# Patient Record
Sex: FEMALE | Race: WHITE | NOT HISPANIC OR LATINO | Employment: OTHER | URBAN - METROPOLITAN AREA
[De-identification: names, ages, dates, MRNs, and addresses within clinical notes are randomized per-mention and may not be internally consistent; named-entity substitution may affect disease eponyms.]

---

## 2017-01-04 ENCOUNTER — TRANSFERRED RECORDS (OUTPATIENT)
Dept: FAMILY MEDICINE | Facility: CLINIC | Age: 67
End: 2017-01-04

## 2017-01-09 RX ORDER — ROSUVASTATIN CALCIUM 10 MG/1
TABLET, COATED ORAL
Qty: 90 TABLET | Refills: 0 | OUTPATIENT
Start: 2017-01-09

## 2017-01-09 NOTE — TELEPHONE ENCOUNTER
Refused Prescriptions:                       Disp   Refills    rosuvastatin (CRESTOR) 10 MG tablet [Pharm*90 tab*0        Sig: TAKE 1 TABLET(10 MG) BY MOUTH DAILY  Refused By: KENNEY BALL  Reason for Refusal: Request already responded to by other means (phone, fax, etc.)  Reason for Refusal Comment: refilled 11- for six months    Hunter  [phone] (home)

## 2017-01-13 RX ORDER — ATENOLOL 25 MG/1
TABLET ORAL
Qty: 90 TABLET | Refills: 0 | OUTPATIENT
Start: 2017-01-13

## 2017-01-17 ENCOUNTER — OFFICE VISIT (OUTPATIENT)
Dept: FAMILY MEDICINE | Facility: CLINIC | Age: 67
End: 2017-01-17

## 2017-01-17 VITALS
OXYGEN SATURATION: 98 % | BODY MASS INDEX: 43.02 KG/M2 | TEMPERATURE: 98.3 F | HEIGHT: 64 IN | HEART RATE: 66 BPM | DIASTOLIC BLOOD PRESSURE: 80 MMHG | WEIGHT: 252 LBS | RESPIRATION RATE: 16 BRPM | SYSTOLIC BLOOD PRESSURE: 120 MMHG

## 2017-01-17 DIAGNOSIS — B02.9 HERPES ZOSTER WITHOUT COMPLICATION: Primary | ICD-10-CM

## 2017-01-17 PROCEDURE — 99213 OFFICE O/P EST LOW 20 MIN: CPT | Performed by: PHYSICIAN ASSISTANT

## 2017-01-17 RX ORDER — ESTRADIOL 10 UG/1
INSERT VAGINAL
COMMUNITY
Start: 2017-01-17 | End: 2018-07-09

## 2017-01-17 RX ORDER — VALACYCLOVIR HYDROCHLORIDE 1 G/1
1000 TABLET, FILM COATED ORAL 3 TIMES DAILY
Qty: 21 TABLET | Refills: 0 | Status: SHIPPED | OUTPATIENT
Start: 2017-01-17 | End: 2017-01-30

## 2017-01-17 RX ORDER — GABAPENTIN 100 MG/1
CAPSULE ORAL
Qty: 90 CAPSULE | Refills: 0 | Status: SHIPPED | OUTPATIENT
Start: 2017-01-17 | End: 2017-01-30

## 2017-01-17 RX ORDER — GABAPENTIN 100 MG/1
CAPSULE ORAL
Qty: 540 CAPSULE | Refills: 0 | OUTPATIENT
Start: 2017-01-17

## 2017-01-17 NOTE — PROGRESS NOTES
Chief Complaint   Patient presents with     Derm Problem       HPI  Maria Victoria Arcos is a 66 year old female presents with a pain and rash on back and right armpit. Pain started on Friday, rash last night  The patient has had shingles in the past.      Past Medical History   Diagnosis Date     Essential hypertension, benign      Other and unspecified hyperlipidemia 10/18/2006     Generalized osteoarthrosis, unspecified site 10/18/2006     Unspecified asthma(493.90)      mild     Sleep apnea      CPAP     Impaired fasting glucose 1/29/2010     Asthma      DVT of upper extremity (deep vein thrombosis) (H)        Patient Active Problem List   Diagnosis     Generalized osteoarthrosis, unspecified site     Esophageal reflux     Herpes simplex virus (HSV) infection     Sleep apnea     Intermittent asthma     Impaired fasting glucose     Factor V Leiden mutation (H)     Health Care Home     HTN, goal below 140/90     HCD (health care directive)     Hyperlipidemia LDL goal <130     Rosacea     Basal cell carcinoma of face     High BMI     Ventral hernia     ACP (advance care planning)     Morbid obesity due to excess calories (H)     Cranial third nerve paresis, left/ diplopia       Current Outpatient Prescriptions   Medication     VAGIFEM 10 MCG TABS vaginal tablet     valACYclovir (VALTREX) 1000 mg tablet     gabapentin (NEURONTIN) 100 MG capsule     atenolol (TENORMIN) 25 MG tablet     rosuvastatin (CRESTOR) 10 MG tablet     potassium chloride SA (POTASSIUM CHLORIDE) 20 MEQ tablet     omeprazole (PRILOSEC) 20 MG capsule     triamterene-hydrochlorothiazide (MAXZIDE) 75-50 MG per tablet     valACYclovir (VALTREX) 1000 mg tablet     albuterol (PROAIR HFA, PROVENTIL HFA, VENTOLIN HFA) 108 (90 BASE) MCG/ACT inhaler     ketoconazole (NIZORAL) 2 % cream     acyclovir (ZOVIRAX) 5 % ointment     budesonide-formoterol (SYMBICORT) 80-4.5 MCG/ACT inhaler     aspirin 81 MG tablet     MULTI-VITAMIN OR TABS     TYLENOL 325 MG OR TABS  "    OMEGA 3 550 MG OR CAPS     No current facility-administered medications for this visit.        Allergies:  Allergies   Allergen Reactions     Advicor Hives     Is a combination medication of niacin and lovastatin     Hylan G-F 20 Swelling     Meperidine Hcl Hives     Methocarbamol Hives       OBJECTIVE  Filed Vitals:    01/17/17 1119   BP: 120/80   Pulse: 66   Temp: 98.3  F (36.8  C)   TempSrc: Oral   Resp: 16   Height: 1.626 m (5' 4\")   Weight: 114.306 kg (252 lb)   SpO2: 98%        PHYSICAL EXAMINATION    Patient is a 66 year old female, in no acute distress.  Vesicles on erythematous bases in clusters on the right upper back and right axilla in a dermatomal pattern.       Assessment/Plan:     (B02.9) Herpes zoster without complication  (primary encounter diagnosis)  Plan: valACYclovir (VALTREX) 1000 mg tablet,         gabapentin (NEURONTIN) 100 MG capsule        The nature of herpes zoster is explained carefully. Lesions should be compressed/soaked with saline, topical antibiotic ointment to any infected lesions; Aloe Vera cream may help minor local pain. Intervention with antiviral agents is extremely helpful early in the course of the disease, less helpful after 2-3 days of symptoms. Postherpetic neuralgia is explained; this may occur especially in the elderly despite every attempt at prevention. Prescription for valacyclovir (Valtrex), which may shorten the course of acute symptoms and reduce the incidence of later neuralgia. The patient understands these issues, and will call as needed for further care.  Gabapentin also rx'd due to pain, AE reviewed    Jacqui Landers PA-C  1/17/2017          "

## 2017-01-17 NOTE — NURSING NOTE
Patient states that she has been feeling under the weather for a few days and having right shoulder pain and then last night and rash stared.  Pre-Visit Screening not done today.  BP done on the right arm, with a lg sized cuff.  Pulse - regular  My Chart - accepts    CLASSIFICATION OF OVERWEIGHT AND OBESITY BY BMI                         Obesity Class           BMI(kg/m2)  Underweight                                    < 18.5  Normal                                         18.5-24.9  Overweight                                     25.0-29.9  OBESITY                     I                  30.0-34.9                              II                 35.0-39.9  EXTREME OBESITY             III                >40                             Patient's  BMI Body mass index is 43.23 kg/(m^2).  http://hin.nhlbi.nih.gov/menuplanner/menu.cgi  Questioned patient about current smoking habits.  Pt. has never smoked.

## 2017-01-23 ENCOUNTER — TELEPHONE (OUTPATIENT)
Dept: FAMILY MEDICINE | Facility: CLINIC | Age: 67
End: 2017-01-23

## 2017-01-23 DIAGNOSIS — B02.9 HERPES ZOSTER WITHOUT COMPLICATION: Primary | ICD-10-CM

## 2017-01-23 NOTE — TELEPHONE ENCOUNTER
Jacqui,     Pt called because she has been having a lot of pain from the shingles under the arm into the chest. She states that she has been taking her husbands pain medication and would like to have something of her own to get through this.  She states that she doesn't feel as if the medication she is on is working and that she is unable to sleep at night. That is why she took her husbands medication.   Please advise.     Phone number is [phone]    SOLANGE Mathur ([facility])

## 2017-01-24 RX ORDER — GABAPENTIN 100 MG/1
CAPSULE ORAL
Qty: 90 CAPSULE | Refills: 0 | Status: SHIPPED | OUTPATIENT
Start: 2017-01-24 | End: 2017-11-14

## 2017-01-24 RX ORDER — HYDROCODONE BITARTRATE AND ACETAMINOPHEN 5; 325 MG/1; MG/1
1-2 TABLET ORAL EVERY 4 HOURS PRN
Qty: 20 TABLET | Refills: 0 | Status: SHIPPED | OUTPATIENT
Start: 2017-01-24 | End: 2017-11-14

## 2017-01-24 NOTE — TELEPHONE ENCOUNTER
Called Pt and you had already prescribed Gabapentin to the Pt. Asked how she is taking it. She states that she is taking 1 pill 3 x's a day, but does not notice a difference. Do you want her to get the new prescription still?  Francesca.SOLANGE Quiroga ([facility])

## 2017-01-24 NOTE — TELEPHONE ENCOUNTER
She can inc. To 200 three times a day.  I also signed an rx for [place] in my pod that she can use short term for the pain.   Have her come pick it up    Jacqui Landers PA-C  1/24/2017

## 2017-01-24 NOTE — TELEPHONE ENCOUNTER
Please call pt - I sent in a prescription for Gabapentin - start by taking at home to see how she reacts to it. It can be very sedating. No drinking or driving. May take 3 times daily.    Jacqui Landers PA-C  1/24/2017

## 2017-01-24 NOTE — TELEPHONE ENCOUNTER
Called the patient and informed her of Jacqui's message.  She is aware and will  prescription when she is able  She states that the arm pain is keeping her up at night and she is thankful for the help  Rx in book  Myesha Reyes CMA

## 2017-01-30 ENCOUNTER — TELEPHONE (OUTPATIENT)
Dept: FAMILY MEDICINE | Facility: CLINIC | Age: 67
End: 2017-01-30

## 2017-01-30 ENCOUNTER — OFFICE VISIT (OUTPATIENT)
Dept: FAMILY MEDICINE | Facility: CLINIC | Age: 67
End: 2017-01-30

## 2017-01-30 VITALS
DIASTOLIC BLOOD PRESSURE: 78 MMHG | HEART RATE: 60 BPM | RESPIRATION RATE: 20 BRPM | BODY MASS INDEX: 43.02 KG/M2 | SYSTOLIC BLOOD PRESSURE: 130 MMHG | WEIGHT: 252 LBS | HEIGHT: 64 IN | TEMPERATURE: 98.5 F

## 2017-01-30 DIAGNOSIS — H93.8X2 SENSATION OF FULLNESS IN EAR, LEFT: ICD-10-CM

## 2017-01-30 DIAGNOSIS — B02.9 HERPES ZOSTER WITHOUT COMPLICATION: ICD-10-CM

## 2017-01-30 DIAGNOSIS — H81.10 BPPV (BENIGN PAROXYSMAL POSITIONAL VERTIGO), UNSPECIFIED LATERALITY: Primary | ICD-10-CM

## 2017-01-30 PROCEDURE — 99213 OFFICE O/P EST LOW 20 MIN: CPT | Performed by: PHYSICIAN ASSISTANT

## 2017-01-30 RX ORDER — GABAPENTIN 100 MG/1
CAPSULE ORAL
Qty: 90 CAPSULE | Refills: 0 | OUTPATIENT
Start: 2017-01-30

## 2017-01-30 NOTE — PROGRESS NOTES
"CC: Vertigo, Ear pain.     History:  Maria Victoria was seen in clinic 1/17 for shingles after developing pain and rash on right neck and shoulder. Started on Valtrex for 7 days. Has still been still having nerve pain, especially at night, but rash is mostly resolved. Taking gabapentin 100 mg tablets 2 times daily, and is trying to taper down.     Maria Victoria has had vertigo in the past, but started having spinning 2 and 3 days ago. Yesterday was okay, but this morning was just standing up, and all of sudden had spinning sensation and had to sit down. Has been trying to do exercises she has used in the past, but not consistently. Also feeling nausea.    Has also been noticing symptoms in her left ear. Hearing seems like underwater. This was a new sensation. That seems to be getting better. No ear pain. No recent URIs.     Has been taking antacid, but no other symptomatic meds.      PMH, MEDICATIONS, ALLERGIES, SOCIAL AND FAMILY HISTORY in [facility] and reviewed by me personally.      ROS negative other than the symptoms noted above in the HPI.        Examination   /78 mmHg  Pulse 60  Temp(Src) 98.5  F (36.9  C) (Oral)  Resp 20  Ht 1.626 m (5' 4\")  Wt 114.306 kg (252 lb)  BMI 43.23 kg/m2       Constitutional: Sitting comfortably, in no acute distress. Vital signs noted  Eyes: pupils equal round reactive to light and accomodation, extra ocular movements intact  Ears: external canals and TMs free of abnormalities  Nose: patent, without mucosal abnormalities  Mouth and throat: without erythema or lesions of the mucosa  Neck:  no adenopathy, trachea midline and normal to palpation  Cardiovascular:  regular rate and rhythm, no murmurs, clicks, or gallops  Respiratory:  normal respiratory rate and rhythm, lungs clear to auscultation  SKIN: No jaundice/pallor. Mildly erythematous plaque on back consistent with healing after vesicle formation. No active vesicles.  Psychiatric: mentation appears normal and affect " normal/bright        A/P    ICD-10-CM    1. BPPV (benign paroxysmal positional vertigo), unspecified laterality H81.10    2. Herpes zoster without complication B02.9    3. Sensation of fullness in ear, left H93.8X2        DISCUSSION: Recommended:  Debrox drop in the ear.   Consider netipot, decongestant, or Flonase to help with drainage of ear fluid.     Try to do vertigo exercises 2-3 times daily. About 5 minutes each time.  Can consider taking Antivert (dramamine) for nausea.    Try taking allergy medication to help with itching of lingering shingles rash. Allegra, Zyrtec (non drowsy/daytime), Benadryl (drowsy/nighttime).     Try to ease off of hydrocordone-acetaminophen (Norco).     Continue taper off gabapentin.    follow up visit: As needed    Renee Horta PA-C  [facility]

## 2017-01-30 NOTE — PATIENT INSTRUCTIONS
Debrox drop on the ear.   Consider netipot, decongestant, or Flonase to help with drainage of ear fluid.     Try to do vertigo exercises 2-3 times daily. About 5 minutes each time.  Can consider taking Antivert (dramamine) for nausea.    Try taking allergy medication to help with itching. Allegra, Zyrtec (non drowsy/daytime), Benadryl (drowsy/nighttime).     Try to ease off of hydrocordone-acetaminophen (Norco).     Continue taper off gabapentin.

## 2017-01-30 NOTE — MR AVS SNAPSHOT
After Visit Summary   1/30/2017    Maria Victoria Arcos    MRN: [id number]           Patient Information     Date Of Birth          1950        Visit Information        Provider Department      1/30/2017 3:30 PM Renee Horta PA-C [facility]        Care Instructions    Debrox drop on the ear.   Consider netipot, decongestant, or Flonase to help with drainage of ear fluid.     Try to do vertigo exercises 2-3 times daily. About 5 minutes each time.  Can consider taking Antivert (dramamine) for nausea.    Try taking allergy medication to help with itching. Allegra, Zyrtec (non drowsy/daytime), Benadryl (drowsy/nighttime).     Try to ease off of hydrocordone-acetaminophen (Norco).     Continue taper off gabapentin.            Follow-ups after your visit        Who to contact     If you have questions or need follow up information about today's clinic visit or your schedule please contact [facility] directly at [phone].  Normal or non-critical lab and imaging results will be communicated to you by [employer], letter or phone within 4 business days after the clinic has received the results. If you do not hear from us within 7 days, please contact the clinic through [employer] or phone. If you have a critical or abnormal lab result, we will notify you by phone as soon as possible.  Submit refill requests through [employer] or call your pharmacy and they will forward the refill request to us. Please allow 3 business days for your refill to be completed.          Additional Information About Your Visit        [employer] Information     [employer] gives you secure access to your electronic health record. If you see a primary care provider, you can also send messages to your care team and make appointments. If you have questions, please call your primary care clinic.  If you do not have a primary care provider, please call [phone] and they will assist you.       "  Care EveryWhere ID     This is your Care EveryWhere ID. This could be used by other organizations to access your [place] medical records  [id number]        Your Vitals Were     Pulse Temperature Respirations Height BMI (Body Mass Index)       60 98.5  F (36.9  C) (Oral) 20 1.626 m (5' 4\") 43.23 kg/m2        Blood Pressure from Last 3 Encounters:   01/30/17 130/78   01/17/17 120/80   11/22/16 120/78    Weight from Last 3 Encounters:   01/30/17 114.306 kg (252 lb)   01/17/17 114.306 kg (252 lb)   11/22/16 112.492 kg (248 lb)              Today, you had the following     No orders found for display       Primary Care Provider Office Phone # Fax #    Lissa Brasher [phone]       [facility] [address] [facility]  [address]  [facility] [address]        Thank you!     Thank you for choosing [facility]  for your care. Our goal is always to provide you with excellent care. Hearing back from our patients is one way we can continue to improve our services. Please take a few minutes to complete the written survey that you may receive in the mail after your visit with us. Thank you!             Your Updated Medication List - Protect others around you: Learn how to safely use, store and throw away your medicines at www.disposemymeds.org.          This list is accurate as of: 1/30/17  4:18 PM.  Always use your most recent med list.                   Brand Name Dispense Instructions for use    acyclovir 5 % ointment    ZOVIRAX    15 g    Apply  topically 5 times daily.       albuterol 108 (90 BASE) MCG/ACT Inhaler    PROAIR HFA/PROVENTIL HFA/VENTOLIN HFA     Inhale 2 puffs into the lungs every 6 hours       aspirin 81 MG tablet     100 tablet    Take 1 tablet by mouth daily.       atenolol 25 MG tablet    TENORMIN    90 tablet    Take 1 tablet (25 mg) by mouth daily       gabapentin 100 MG capsule    NEURONTIN    90 capsule    Take one capsule 3 times daily. May increase to " 3 capsules, 3 times daily       HYDROcodone-acetaminophen 5-325 MG per tablet    NORCO    20 tablet    Take 1-2 tablets by mouth every 4 hours as needed for moderate to severe pain maximum 6 tablet(s) per day       ketoconazole 2 % cream    NIZORAL    30 g    Apply  topically daily as needed.       Multi-vitamin Tabs tablet   Generic drug:  multivitamin, therapeutic with minerals      1 po daily       OMEGA 3 550 MG Caps   Generic drug:  LINOLENIC ACID      1200 mg daily       omeprazole 20 MG CR capsule    priLOSEC    90 capsule    Take 1 capsule (20 mg) by mouth daily as needed       potassium chloride SA 20 MEQ CR tablet    potassium chloride    100 tablet    Take 1 tablet (20 mEq) by mouth daily       rosuvastatin 10 MG tablet    CRESTOR    90 tablet    Take 1 tablet (10 mg) by mouth daily       SYMBICORT 80-4.5 MCG/ACT Inhaler   Generic drug:  budesonide-formoterol     1 Inhaler    1 puff 2 times daily. Pulmonology is filling this       triamterene-hydrochlorothiazide 75-50 MG per tablet    MAXZIDE    90 tablet    Take 0.5-1 tablets by mouth daily       TYLENOL 325 MG tablet   Generic drug:  acetaminophen     100    ONE TO TWO TABLETS EVERY 4 TO 6 HOURS AS NEEDED FOR PAIN       VAGIFEM 10 MCG Tabs vaginal tablet   Generic drug:  estradiol          valACYclovir 1000 mg tablet    VALTREX    12 tablet    TAKE 2 TABLETS BY MOUTH TWICE DAILY

## 2017-01-30 NOTE — NURSING NOTE
Maria Victoria Arcos is here for vertigo and nausea since Friday night. Had this before about 2 weeks ago then went away. Is back again.    Questioned patient about current smoking habits.  Pt. has never smoked.  Body mass index is 43.23 kg/(m^2).  BP Cuff left  Arm  L  Cuff  PULSE regular  My Chart: active  CLASSIFICATION OF OVERWEIGHT AND OBESITY BY BMI                        Obesity Class           BMI(kg/m2)  Underweight                                    < 18.5  Normal                                         18.5-24.9  Overweight                                     25.0-29.9  OBESITY                     I                  30.0-34.9                             II                 35.0-39.9  EXTREME OBESITY             III                >40                            Patient's  BMI Body mass index is 43.23 kg/(m^2).  http://hin.nhlbi.nih.gov/menuplanner/menu.cgi    Pre-visit planning  Immunizations - up to date  Colonoscopy - is up to date  Mammogram - is up to date  Asthma -   PHQ9 -    PAT-7 -

## 2017-01-30 NOTE — TELEPHONE ENCOUNTER
Pt called because she is having hearing problems. She states that it's like having double hearing, and she says she feels like she is going to tip over. She states she has vertigo, but is not dizzy.  Asked Pt if she wanted to be seen and she said she would feel more comfortable with being seen.  Scheduled an appointment with Suad at 3:30 on 1/30/17  Francesca.SOLANGE Quiroga ([facility])

## 2017-01-30 NOTE — TELEPHONE ENCOUNTER
Refused Prescriptions:                       Disp   Refills    gabapentin (NEURONTIN) 100 MG capsule [Pha*90 cap*0        Sig: TAKE 1 TO 2 CAPSULES BY MOUTH UP TO THREE TIMES DAILY  Refused By: KENNEY BALL  Reason for Refusal: Request already responded to by other means (phone, fax, etc.)  Reason for Refusal Comment: refilled 1- for 90 days     Eves  [phone] (home)

## 2017-03-06 ENCOUNTER — TRANSFERRED RECORDS (OUTPATIENT)
Dept: FAMILY MEDICINE | Facility: CLINIC | Age: 67
End: 2017-03-06

## 2017-05-18 ENCOUNTER — TRANSFERRED RECORDS (OUTPATIENT)
Dept: FAMILY MEDICINE | Facility: CLINIC | Age: 67
End: 2017-05-18

## 2017-05-18 DIAGNOSIS — K21.9 GASTROESOPHAGEAL REFLUX DISEASE WITHOUT ESOPHAGITIS: ICD-10-CM

## 2017-05-18 RX ORDER — CONJUGATED ESTROGENS 0.62 MG/G
CREAM VAGINAL
Refills: 4 | COMMUNITY
Start: 2017-03-13 | End: 2018-07-09

## 2017-05-18 NOTE — TELEPHONE ENCOUNTER
Patient is due for a medication recheck for the following medication Omeprazole ,and meets the qualifications for a physician approved 30 day extension.    A #30 day refill has been called into the pharmacy listed.     staff, per the refill protocol can you please call the patient and help assist in setting up a Fasting medication recheck.  Please attempt to reach the patient to schedule that appointment, and if you are unable to reach them, please forward back to the prescribing physician.      Telephone Information:   Mobile [phone]   Mobile [phone] (home)       Thank You  SOLANGE Wells

## 2017-05-18 NOTE — TELEPHONE ENCOUNTER
A 30 day supply of her Omeprazole was called in to her pharmacy today and the insurance is requiring a 90 day supply be sent.  Patient has been informed that she is due for a fasting medication check and states she will call back to schedule.    Dr. Brasher would you send a 90 day supply?    Pending Prescriptions:                       Disp   Refills    omeprazole (PRILOSEC) 20 MG CR capsule [P*90 cap*0            Sig: TAKE 1 CAPSULE BY MOUTH DAILY AS NEEDED    Please Advise    Myesha Reyes, CMA

## 2017-05-26 DIAGNOSIS — R60.1 GENERALIZED EDEMA: ICD-10-CM

## 2017-05-26 DIAGNOSIS — I10 ESSENTIAL HYPERTENSION, BENIGN: ICD-10-CM

## 2017-05-26 RX ORDER — TRIAMTERENE AND HYDROCHLOROTHIAZIDE 75; 50 MG/1; MG/1
TABLET ORAL
Qty: 90 TABLET | Refills: 0 | OUTPATIENT
Start: 2017-05-26

## 2017-05-26 RX ORDER — TRIAMTERENE AND HYDROCHLOROTHIAZIDE 75; 50 MG/1; MG/1
TABLET ORAL
Qty: 30 TABLET | Refills: 0 | Status: SHIPPED | OUTPATIENT
Start: 2017-05-26 | End: 2017-06-13

## 2017-05-26 NOTE — TELEPHONE ENCOUNTER
Refused Prescriptions:                       Disp   Refills    triamterene-hydrochlorothiazide (MAXZIDE) *90 tab*0        Sig: TAKE 1 TABLET BY MOUTH EVERY DAY  Refused By: KENNEY BALL  Reason for Refusal: Request already responded to by other means (phone, fax, etc.)  Reason for Refusal Comment: faxed 30 today pt has a future appt    [place]  [phone] (home)

## 2017-05-26 NOTE — TELEPHONE ENCOUNTER
Pending Prescriptions:                       Disp   Refills    triamterene-hydrochlorothiazide (MAXZIDE)*30 tab*0            Sig: TAKE 1/2 TO 1 TABLET BY MOUTH DAILY    Pt has a med check on 6-  Please fax 30 and close encounter  Hunter  [phone] (home)

## 2017-06-12 ENCOUNTER — OFFICE VISIT (OUTPATIENT)
Dept: FAMILY MEDICINE | Facility: CLINIC | Age: 67
End: 2017-06-12

## 2017-06-12 VITALS
DIASTOLIC BLOOD PRESSURE: 74 MMHG | OXYGEN SATURATION: 97 % | TEMPERATURE: 98.5 F | HEART RATE: 63 BPM | HEIGHT: 64 IN | WEIGHT: 252.4 LBS | BODY MASS INDEX: 43.09 KG/M2 | SYSTOLIC BLOOD PRESSURE: 148 MMHG

## 2017-06-12 DIAGNOSIS — E78.5 HYPERLIPIDEMIA LDL GOAL <130: ICD-10-CM

## 2017-06-12 DIAGNOSIS — R00.2 PALPITATIONS: ICD-10-CM

## 2017-06-12 DIAGNOSIS — I10 ESSENTIAL HYPERTENSION, BENIGN: Primary | ICD-10-CM

## 2017-06-12 DIAGNOSIS — K21.9 GASTROESOPHAGEAL REFLUX DISEASE WITHOUT ESOPHAGITIS: ICD-10-CM

## 2017-06-12 DIAGNOSIS — R60.1 GENERALIZED EDEMA: ICD-10-CM

## 2017-06-12 DIAGNOSIS — E66.01 MORBID OBESITY DUE TO EXCESS CALORIES (H): ICD-10-CM

## 2017-06-12 PROCEDURE — 36415 COLL VENOUS BLD VENIPUNCTURE: CPT | Performed by: PHYSICIAN ASSISTANT

## 2017-06-12 PROCEDURE — 80061 LIPID PANEL: CPT | Mod: 90 | Performed by: PHYSICIAN ASSISTANT

## 2017-06-12 PROCEDURE — 99214 OFFICE O/P EST MOD 30 MIN: CPT | Performed by: PHYSICIAN ASSISTANT

## 2017-06-12 PROCEDURE — 80053 COMPREHEN METABOLIC PANEL: CPT | Mod: 90 | Performed by: PHYSICIAN ASSISTANT

## 2017-06-12 NOTE — PROGRESS NOTES
SUBJECTIVE:                                                    Maria Victoria Arcos is a 66 year old female who presents to clinic today for the following health issues:    Hyperlipidemia Follow-Up      Rate your low fat/cholesterol diet?: good    Taking statin?  Yes, no muscle aches from statin    Other lipid medications/supplements?:  none     Hypertension Follow-up      Outpatient blood pressures are not being checked.    Low Salt Diet: not monitoring salt           Other concerns to address:    1. GERD-  controlled    2. . Asthma - Dr. Kunz    3. In the last 6 months has had increasing palpitations. About once a week lasting > 1 min. No associated lightheadedness or dizziness.     4. Hx of left arm DVT - Factor V Leiden def.     5. Necrotic area on last mammogram - bx done - advised FU 1 year    -------------------------------------    ROS:  Constitutional, HEENT, cardiovascular, pulmonary, GI and  systems are negative, except as otherwise noted.    Problem list, Medication list, Allergies, and Medical/Social/Surgical histories reviewed in [facility] and updated as appropriate.  Labs reviewed in EPIC  BP Readings from Last 3 Encounters:   06/12/17 148/74   01/30/17 130/78   01/17/17 120/80    Wt Readings from Last 3 Encounters:   06/12/17 114.5 kg (252 lb 6.4 oz)   01/30/17 114.3 kg (252 lb)   01/17/17 114.3 kg (252 lb)                  Patient Active Problem List   Diagnosis     Generalized osteoarthrosis, unspecified site     Herpes simplex virus (HSV) infection     Sleep apnea     Intermittent asthma     Impaired fasting glucose     Factor V Leiden mutation (H)     Health Care Home     HCD (health care directive)     Hyperlipidemia LDL goal <130     Rosacea     Basal cell carcinoma of face     Ventral hernia     ACP (advance care planning)     Morbid obesity due to excess calories (H)     Cranial third nerve paresis, left/ diplopia     Gastroesophageal reflux disease without esophagitis     Essential hypertension,  benign     Past Surgical History:   Procedure Laterality Date     APPENDECTOMY       CARPAL TUNNEL RELEASE RT/LT       CHOLECYSTECTOMY, LAPOROSCOPIC      Cholecystectomy, Laparoscopic     COLONOSCOPY  9/04    diverticulosis     D & C       EYE SURGERY       HERNIORRHAPHY VENTRAL N/A 11/15/2015    Procedure: HERNIORRHAPHY VENTRAL;  Surgeon: Rell Green MD;  Location: [facility] OR     HYSTERECTOMY, SMITA      one ovary left     INCISE FINGER TENDON SHEATH      right middle finger     SURGICAL HISTORY OF -   6/08    pubovaginal sling     SURGICAL HISTORY OF -       left knee replacement     SURGICAL HISTORY OF -   2006    right thumb joint replacement     SURGICAL HISTORY OF -   1/09    left thumb joint replacement     SURGICAL HISTORY OF -   11/4/13    resection of urachal mass, parital cystectomy sigmoid colectomy. 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     SURGICAL HISTORY OF -   Nov 2013    cystoscopic removal of bladdermass prior to surgery on 11/4/13     SURGICAL HISTORY OF -   4/15    Moh's for basal cell ca on foreheard       Social History   Substance Use Topics     Smoking status: Never Smoker     Smokeless tobacco: Never Used     Alcohol use Yes      Comment: social; maybe 1 per week     Family History   Problem Relation Age of Onset     C.A.D. Father      under age 55     DIABETES Father      Hypertension Father      CEREBROVASCULAR DISEASE Father      Neurologic Disorder Father      dementia; Parkinson's     DIABETES Mother      Hypertension Mother      CEREBROVASCULAR DISEASE Mother      Circulatory Mother      ITP     GASTROINTESTINAL DISEASE Mother      severe diverticulitis     Lipids Sister      Obesity Father      Obesity Mother      Obesity Sister      Arthritis Sister      HEART DISEASE Mother      paroxysmal a fib     Arthritis Mother      Allergies Mother      Blood Disease Mother      CEREBROVASCULAR DISEASE Mother      Breast Cancer Maternal Aunt      Breast Cancer Maternal Aunt          Current  Outpatient Prescriptions   Medication Sig Dispense Refill     triamterene-hydrochlorothiazide (MAXZIDE) 75-50 MG per tablet TAKE 1/2 TO 1 TABLET BY MOUTH DAILY 90 tablet 1     atenolol (TENORMIN) 25 MG tablet Take 1 tablet (25 mg) by mouth daily 90 tablet 1     rosuvastatin (CRESTOR) 10 MG tablet Take 1 tablet (10 mg) by mouth daily 90 tablet 1     potassium chloride SA (K-DUR/KLOR-CON M) 20 MEQ CR tablet TAKE 1 TABLET(20 MEQ) BY MOUTH DAILY 100 tablet 1     [DISCONTINUED] triamterene-hydrochlorothiazide (MAXZIDE) 75-50 MG per tablet TAKE 1/2 TO 1 TABLET BY MOUTH DAILY 30 tablet 0     omeprazole (PRILOSEC) 20 MG CR capsule TAKE 1 CAPSULE(20 MG) BY MOUTH DAILY AS NEEDED 30 capsule 0     omeprazole (PRILOSEC) 20 MG CR capsule TAKE 1 CAPSULE BY MOUTH DAILY AS NEEDED 90 capsule 0     PREMARIN cream APPLY A NICKEL SIZED AMOUNT TO VULVA NIGHTLY FOR 2 WEEKS THEN TWICE WEEKLY THEREAFTER  4     gabapentin (NEURONTIN) 100 MG capsule Take one capsule 3 times daily. May increase to 3 capsules, 3 times daily 90 capsule 0     HYDROcodone-acetaminophen (NORCO) 5-325 MG per tablet Take 1-2 tablets by mouth every 4 hours as needed for moderate to severe pain maximum 6 tablet(s) per day 20 tablet 0     VAGIFEM 10 MCG TABS vaginal tablet        [DISCONTINUED] atenolol (TENORMIN) 25 MG tablet Take 1 tablet (25 mg) by mouth daily 90 tablet 1     [DISCONTINUED] rosuvastatin (CRESTOR) 10 MG tablet Take 1 tablet (10 mg) by mouth daily 90 tablet 1     valACYclovir (VALTREX) 1000 mg tablet TAKE 2 TABLETS BY MOUTH TWICE DAILY 12 tablet 1     albuterol (PROAIR HFA, PROVENTIL HFA, VENTOLIN HFA) 108 (90 BASE) MCG/ACT inhaler Inhale 2 puffs into the lungs every 6 hours       ketoconazole (NIZORAL) 2 % cream Apply  topically daily as needed. 30 g prn     acyclovir (ZOVIRAX) 5 % ointment Apply  topically 5 times daily. 15 g prn     budesonide-formoterol (SYMBICORT) 80-4.5 MCG/ACT inhaler 1 puff 2 times daily. Pulmonology is filling this 1 Inhaler   "    aspirin 81 MG tablet Take 1 tablet by mouth daily. 100 tablet 3     MULTI-VITAMIN OR TABS 1 po daily  0     TYLENOL 325 MG OR TABS ONE TO TWO TABLETS EVERY 4 TO 6 HOURS AS NEEDED FOR PAIN 100 0     OMEGA 3 550 MG OR CAPS 1200 mg daily  0     Allergies   Allergen Reactions     Advicor Hives     Is a combination medication of niacin and lovastatin     Hylan G-F 20 Swelling     Meperidine Hcl Hives     Methocarbamol Hives     Recent Labs   Lab Test  06/12/17   1506  11/22/16   1455  05/04/16   1246  12/03/15   1316   11/16/15   0546   04/03/15   0906 01/13/15  04/16/14   0936   08/29/13   0847   A1C   --    --    --    --    --    --    --   6.0   --   6.1*   --   5.6   LDL  55  78  77   --    --    --    --   48   --   62   --    --    HDL  39*  47  44*   --    --    --    --   46*   --   35*   --    --    TRIG  211*  195*  215*   --    --    --    --   204*   --   258*   --    --    ALT  22  23  22   --    --    --    < >  33   --   36   --    --    CR  0.84  0.84  0.83  0.88   --   0.70   < >  0.91   --   0.83   < >  0.76   GFRESTIMATED  73  73  75  64   --   83   < >  62   --   70   < >  77   GFRESTBLACK   --    --    --   78   --   >90   GFR Calc     < >  75   --   84   < >  >90   POTASSIUM  3.7  3.9  3.5  3.4   < >  3.2*   < >  3.9   --   4.3   < >  3.7   TSH   --    --    --    --    --    --    --    --   2.8  2.8   --    --    --     < > = values in this interval not displayed.        OBJECTIVE:                                                    /74 (BP Location: Right arm, Patient Position: Chair, Cuff Size: Adult Large)  Pulse 63  Temp 98.5  F (36.9  C) (Oral)  Ht 1.619 m (5' 3.75\")  Wt 114.5 kg (252 lb 6.4 oz)  SpO2 97%  Breastfeeding? No  BMI 43.66 kg/m2   Body mass index is 43.66 kg/(m^2).   GENERAL:  alert, well nourished, well hydrated, no distress  Head: Normocephalic, atraumatic.  Eyes: Conjunctiva clear, no discharge  Ears: External ears and TMs normal BL.  Nose: " "Nasal mucosa pink and moist. No discharge.  Mouth / Throat: Mucous membranes moist.  Pharynx non-erythematous, no exudates.   Neck: Supple, No thyromegaly or nodules. No lymphadenopathy.  Cv: regular rate and rhythm, normal s1 and s2 without murmur or click  Lungs: clear to auscultation, no wheezing, rhonchi, or crackles  Abd: normal bowel sounds, soft, non-tender, no masses, no hepatomegaly or splenomegaly.          ASSESSMENT/PLAN:                                                    1. Morbid obesity due to excess calories (H)  - VENOUS COLLECTION  - Lipid Profile (QUEST)  - COMPREHENSIVE METABOLIC PANEL ([facility]) XCMP    2. Essential hypertension, benign  - VENOUS COLLECTION  - COMPREHENSIVE METABOLIC PANEL (QUEST) XCMP  - triamterene-hydrochlorothiazide (MAXZIDE) 75-50 MG per tablet; TAKE 1/2 TO 1 TABLET BY MOUTH DAILY  Dispense: 90 tablet; Refill: 1  - atenolol (TENORMIN) 25 MG tablet; Take 1 tablet (25 mg) by mouth daily  Dispense: 90 tablet; Refill: 1    3. Palpitations  Advised event monitor.  ER/911 with any acute sx not spontaneously resolving  - Cardiac Event Monitor - Peds/Adult; Future    4. Generalized edema  - triamterene-hydrochlorothiazide (MAXZIDE) 75-50 MG per tablet; TAKE 1/2 TO 1 TABLET BY MOUTH DAILY  Dispense: 90 tablet; Refill: 1    5. Hyperlipidemia LDL goal <130    - rosuvastatin (CRESTOR) 10 MG tablet; Take 1 tablet (10 mg) by mouth daily  Dispense: 90 tablet; Refill: 1    6. Gastroesophageal reflux disease without esophagitis        BMI:   Estimated body mass index is 43.66 kg/(m^2) as calculated from the following:    Height as of this encounter: 1.619 m (5' 3.75\").    Weight as of this encounter: 114.5 kg (252 lb 6.4 oz).   Weight management plan: Discussed healthy diet and exercise guidelines and patient will follow up in 6 months in clinic to re-evaluate.      FUTURE APPOINTMENTS:       - Follow-up visit in 6 months      Jacqui Landers PA-C      "

## 2017-06-12 NOTE — MR AVS SNAPSHOT
After Visit Summary   6/12/2017    Maria Victoria Arcos    MRN: [id number]           Patient Information     Date Of Birth          1950        Visit Information        Provider Department      6/12/2017 11:00 AM Jacqui Landers PA [facility]        Today's Diagnoses     Essential hypertension, benign    -  1    Morbid obesity due to excess calories (H)        Palpitations        Generalized edema        Hyperlipidemia LDL goal <130        Gastroesophageal reflux disease without esophagitis           Follow-ups after your visit        Future tests that were ordered for you today     Open Future Orders        Priority Expected Expires Ordered    Cardiac Event Monitor - Peds/Adult Routine  7/28/2017 6/13/2017            Who to contact     If you have questions or need follow up information about today's clinic visit or your schedule please contact [facility] directly at [phone].  Normal or non-critical lab and imaging results will be communicated to you by [employer], letter or phone within 4 business days after the clinic has received the results. If you do not hear from us within 7 days, please contact the clinic through [employer] or phone. If you have a critical or abnormal lab result, we will notify you by phone as soon as possible.  Submit refill requests through [employer] or call your pharmacy and they will forward the refill request to us. Please allow 3 business days for your refill to be completed.          Additional Information About Your Visit        MyChart Information     [employer] gives you secure access to your electronic health record. If you see a primary care provider, you can also send messages to your care team and make appointments. If you have questions, please call your primary care clinic.  If you do not have a primary care provider, please call [phone] and they will assist you.        Care EveryWhere ID     This is  "your Care EveryWhere ID. This could be used by other organizations to access your [place] medical records  [id number]        Your Vitals Were     Pulse Temperature Height Pulse Oximetry Breastfeeding? BMI (Body Mass Index)    63 98.5  F (36.9  C) (Oral) 1.619 m (5' 3.75\") 97% No 43.66 kg/m2       Blood Pressure from Last 3 Encounters:   06/12/17 148/74   01/30/17 130/78   01/17/17 120/80    Weight from Last 3 Encounters:   06/12/17 114.5 kg (252 lb 6.4 oz)   01/30/17 114.3 kg (252 lb)   01/17/17 114.3 kg (252 lb)              We Performed the Following     COMPREHENSIVE METABOLIC PANEL ([facility]) XCMP     Lipid Profile (QUEST)     VENOUS COLLECTION          Today's Medication Changes          These changes are accurate as of: 6/12/17 11:59 PM.  If you have any questions, ask your nurse or doctor.               These medicines have changed or have updated prescriptions.        Dose/Directions    triamterene-hydrochlorothiazide 75-50 MG per tablet   Commonly known as:  MAXZIDE   This may have changed:  See the new instructions.   Used for:  Generalized edema, Essential hypertension, benign   Changed by:  Jacqui Landers PA        TAKE 1/2 TO 1 TABLET BY MOUTH DAILY   Quantity:  90 tablet   Refills:  1            Where to get your medicines      These medications were sent to [facility] [address] [facility] [address] [facility] AT [place] [address] & [facility]  [address] [address] [address]     Phone:  [phone]     atenolol 25 MG tablet    rosuvastatin 10 MG tablet    triamterene-hydrochlorothiazide 75-50 MG per tablet                Primary Care Provider Office Phone # Fax #    Lissa Brasher [phone]       [facility] [address] [facility]  [address]  [facility] [address]        Thank you!     Thank you for choosing [facility]  for your care. Our goal is always to provide you with excellent care. Hearing back from our " patients is one way we can continue to improve our services. Please take a few minutes to complete the written survey that you may receive in the mail after your visit with us. Thank you!             Your Updated Medication List - Protect others around you: Learn how to safely use, store and throw away your medicines at www.disposemymeds.org.          This list is accurate as of: 6/12/17 11:59 PM.  Always use your most recent med list.                   Brand Name Dispense Instructions for use    acyclovir 5 % ointment    ZOVIRAX    15 g    Apply  topically 5 times daily.       albuterol 108 (90 BASE) MCG/ACT Inhaler    PROAIR HFA/PROVENTIL HFA/VENTOLIN HFA     Inhale 2 puffs into the lungs every 6 hours       aspirin 81 MG tablet     100 tablet    Take 1 tablet by mouth daily.       atenolol 25 MG tablet    TENORMIN    90 tablet    Take 1 tablet (25 mg) by mouth daily       gabapentin 100 MG capsule    NEURONTIN    90 capsule    Take one capsule 3 times daily. May increase to 3 capsules, 3 times daily       HYDROcodone-acetaminophen 5-325 MG per tablet    NORCO    20 tablet    Take 1-2 tablets by mouth every 4 hours as needed for moderate to severe pain maximum 6 tablet(s) per day       ketoconazole 2 % cream    NIZORAL    30 g    Apply  topically daily as needed.       Multi-vitamin Tabs tablet   Generic drug:  multivitamin, therapeutic with minerals      1 po daily       OMEGA 3 550 MG Caps   Generic drug:  LINOLENIC ACID      1200 mg daily       * omeprazole 20 MG CR capsule    priLOSEC    30 capsule    TAKE 1 CAPSULE(20 MG) BY MOUTH DAILY AS NEEDED       * omeprazole 20 MG CR capsule    priLOSEC    90 capsule    TAKE 1 CAPSULE BY MOUTH DAILY AS NEEDED       PREMARIN cream   Generic drug:  conjugated estrogens      APPLY A NICKEL SIZED AMOUNT TO VULVA NIGHTLY FOR 2 WEEKS THEN TWICE WEEKLY THEREAFTER       rosuvastatin 10 MG tablet    CRESTOR    90 tablet    Take 1 tablet (10 mg) by mouth daily       SYMBICORT  80-4.5 MCG/ACT Inhaler   Generic drug:  budesonide-formoterol     1 Inhaler    1 puff 2 times daily. Pulmonology is filling this       triamterene-hydrochlorothiazide 75-50 MG per tablet    MAXZIDE    90 tablet    TAKE 1/2 TO 1 TABLET BY MOUTH DAILY       TYLENOL 325 MG tablet   Generic drug:  acetaminophen     100    ONE TO TWO TABLETS EVERY 4 TO 6 HOURS AS NEEDED FOR PAIN       VAGIFEM 10 MCG Tabs vaginal tablet   Generic drug:  estradiol          valACYclovir 1000 mg tablet    VALTREX    12 tablet    TAKE 2 TABLETS BY MOUTH TWICE DAILY       * Notice:  This list has 2 medication(s) that are the same as other medications prescribed for you. Read the directions carefully, and ask your doctor or other care provider to review them with you.

## 2017-06-12 NOTE — NURSING NOTE
Maria Victoria LEE Arcos is here for a recheck of her medication, patient is fasting.    Pre-Visit Screening :  Immunizations : up to date  Colonoscopy : is up to date (Performed on 10/30/13 at [facility])  Mammogram : is up to date  Asthma Action Test/Plan : Not up to date  PHQ9/GAD7 :  Up to date  Pulse - regular  My Chart - accepts    CLASSIFICATION OF OVERWEIGHT AND OBESITY BY BMI                         Obesity Class           BMI(kg/m2)  Underweight                                    < 18.5  Normal                                         18.5-24.9  Overweight                                     25.0-29.9  OBESITY                     I                  30.0-34.9                              II                 35.0-39.9  EXTREME OBESITY             III                >40                             Patient's  BMI Body mass index is 43.66 kg/(m^2).  http://hin.nhlbi.nih.gov/menuplanner/menu.cgi  Questioned patient about current smoking habits.  Pt. has never smoked.  Myesha Reyes, CMA

## 2017-06-13 ENCOUNTER — TRANSFERRED RECORDS (OUTPATIENT)
Dept: FAMILY MEDICINE | Facility: CLINIC | Age: 67
End: 2017-06-13

## 2017-06-13 DIAGNOSIS — I10 ESSENTIAL HYPERTENSION, BENIGN: ICD-10-CM

## 2017-06-13 PROBLEM — K21.9 GASTROESOPHAGEAL REFLUX DISEASE WITHOUT ESOPHAGITIS: Status: ACTIVE | Noted: 2017-06-13

## 2017-06-13 LAB
ALBUMIN SERPL-MCNC: 4.1 G/DL (ref 3.6–5.1)
ALBUMIN/GLOB SERPL: 1.6 (CALC) (ref 1–2.5)
ALP SERPL-CCNC: 48 U/L (ref 33–130)
ALT SERPL-CCNC: 22 U/L (ref 6–29)
AST SERPL-CCNC: 22 U/L (ref 10–35)
BILIRUB SERPL-MCNC: 0.6 MG/DL (ref 0.2–1.2)
BUN SERPL-MCNC: 14 MG/DL (ref 7–25)
BUN/CREATININE RATIO: NORMAL (CALC) (ref 6–22)
CALCIUM SERPL-MCNC: 8.9 MG/DL (ref 8.6–10.4)
CHLORIDE SERPLBLD-SCNC: 101 MMOL/L (ref 98–110)
CHOLEST SERPL-MCNC: 136 MG/DL (ref 125–200)
CHOLEST/HDLC SERPL: 3.5 (CALC)
CO2 SERPL-SCNC: 26 MMOL/L (ref 20–31)
CREAT SERPL-MCNC: 0.84 MG/DL (ref 0.5–0.99)
EGFR AFRICAN AMERICAN - QUEST: 85 ML/MIN/1.73M2
GFR SERPL CREATININE-BSD FRML MDRD: 73 ML/MIN/1.73M2
GLOBULIN, CALCULATED - QUEST: 2.5 G/DL (CALC) (ref 1.9–3.7)
GLUCOSE - QUEST: 97 MG/DL (ref 65–99)
HDLC SERPL-MCNC: 39 MG/DL
LDLC SERPL CALC-MCNC: 55 MG/DL (CALC)
NONHDLC SERPL-MCNC: 97 MG/DL (CALC)
POTASSIUM SERPL-SCNC: 3.7 MMOL/L (ref 3.5–5.3)
PROT SERPL-MCNC: 6.6 G/DL (ref 6.1–8.1)
SODIUM SERPL-SCNC: 138 MMOL/L (ref 135–146)
TRIGL SERPL-MCNC: 211 MG/DL

## 2017-06-13 RX ORDER — TRIAMTERENE AND HYDROCHLOROTHIAZIDE 75; 50 MG/1; MG/1
TABLET ORAL
Qty: 90 TABLET | Refills: 1 | Status: SHIPPED | OUTPATIENT
Start: 2017-06-13 | End: 2017-11-14

## 2017-06-13 RX ORDER — ROSUVASTATIN CALCIUM 10 MG/1
10 TABLET, COATED ORAL DAILY
Qty: 90 TABLET | Refills: 1 | Status: SHIPPED | OUTPATIENT
Start: 2017-06-13 | End: 2017-11-14

## 2017-06-13 RX ORDER — POTASSIUM CHLORIDE 1500 MG/1
TABLET, EXTENDED RELEASE ORAL
Qty: 100 TABLET | Refills: 1 | Status: SHIPPED | OUTPATIENT
Start: 2017-06-13 | End: 2017-11-14

## 2017-06-13 RX ORDER — ATENOLOL 25 MG/1
25 TABLET ORAL DAILY
Qty: 90 TABLET | Refills: 1 | Status: SHIPPED | OUTPATIENT
Start: 2017-06-13 | End: 2017-11-14

## 2017-06-13 NOTE — TELEPHONE ENCOUNTER
Pending Prescriptions:                       Disp   Refills    potassium chloride SA (K-DUR/KLOR-CON M) *100 ta*0            Sig: TAKE 1 TABLET(20 MEQ) BY MOUTH DAILY    Please review:    Pt was here yesterday saw CER   Blood work was done  Please change qty, fax, or savanah Harrison  [phone] (home)

## 2017-06-19 ENCOUNTER — HOSPITAL ENCOUNTER (OUTPATIENT)
Dept: CARDIOLOGY | Facility: CLINIC | Age: 67
Discharge: HOME | End: 2017-06-19
Attending: PHYSICIAN ASSISTANT | Admitting: PHYSICIAN ASSISTANT
Payer: MEDICARE

## 2017-06-19 DIAGNOSIS — R00.2 PALPITATIONS: ICD-10-CM

## 2017-06-19 PROCEDURE — 93272 ECG/REVIEW INTERPRET ONLY: CPT | Performed by: INTERNAL MEDICINE

## 2017-06-19 PROCEDURE — 93270 REMOTE 30 DAY ECG REV/REPORT: CPT

## 2017-06-23 DIAGNOSIS — I10 ESSENTIAL HYPERTENSION, BENIGN: ICD-10-CM

## 2017-06-23 DIAGNOSIS — R60.1 GENERALIZED EDEMA: ICD-10-CM

## 2017-06-23 RX ORDER — TRIAMTERENE AND HYDROCHLOROTHIAZIDE 75; 50 MG/1; MG/1
TABLET ORAL
Qty: 30 TABLET | Refills: 0 | OUTPATIENT
Start: 2017-06-23

## 2017-06-23 NOTE — TELEPHONE ENCOUNTER
Refused Prescriptions:                       Disp   Refills    triamterene-hydrochlorothiazide (MAXZIDE) *30 tab*0        Sig: TAKE 1 TABLET BY MOUTH EVERY DAY  Refused By: KENNEY BALL  Reason for Refusal: Request already responded to by other means (phone, fax, etc.)  Reason for Refusal Comment: refilled 6- for six months    [place]  [phone] (home)

## 2017-07-29 ENCOUNTER — HEALTH MAINTENANCE LETTER (OUTPATIENT)
Age: 67
End: 2017-07-29

## 2017-07-31 ENCOUNTER — MYC MEDICAL ADVICE (OUTPATIENT)
Dept: FAMILY MEDICINE | Facility: CLINIC | Age: 67
End: 2017-07-31

## 2017-08-25 ENCOUNTER — TRANSFERRED RECORDS (OUTPATIENT)
Dept: FAMILY MEDICINE | Facility: CLINIC | Age: 67
End: 2017-08-25

## 2017-08-25 ENCOUNTER — OFFICE VISIT (OUTPATIENT)
Dept: FAMILY MEDICINE | Facility: CLINIC | Age: 67
End: 2017-08-25

## 2017-08-25 VITALS
HEIGHT: 64 IN | RESPIRATION RATE: 16 BRPM | WEIGHT: 251 LBS | SYSTOLIC BLOOD PRESSURE: 124 MMHG | TEMPERATURE: 98.3 F | DIASTOLIC BLOOD PRESSURE: 84 MMHG | HEART RATE: 76 BPM | BODY MASS INDEX: 42.85 KG/M2 | OXYGEN SATURATION: 99 %

## 2017-08-25 DIAGNOSIS — K57.32 DIVERTICULITIS OF COLON: Primary | ICD-10-CM

## 2017-08-25 PROCEDURE — 99213 OFFICE O/P EST LOW 20 MIN: CPT | Performed by: PHYSICIAN ASSISTANT

## 2017-08-25 RX ORDER — CIPROFLOXACIN 500 MG/1
500 TABLET, FILM COATED ORAL 2 TIMES DAILY
Qty: 20 TABLET | Refills: 0 | Status: SHIPPED | OUTPATIENT
Start: 2017-08-25 | End: 2017-09-04

## 2017-08-25 RX ORDER — METRONIDAZOLE 500 MG/1
500 TABLET ORAL 3 TIMES DAILY
Qty: 30 TABLET | Refills: 0 | Status: SHIPPED | OUTPATIENT
Start: 2017-08-25 | End: 2017-09-04

## 2017-08-25 NOTE — MR AVS SNAPSHOT
"              After Visit Summary   8/25/2017    Maria Victoria Arcos    MRN: [id number]           Patient Information     Date Of Birth          1950        Visit Information        Provider Department      8/25/2017 10:30 AM Renee Horta PA-C [facility]        Today's Diagnoses     Diverticulitis of colon    -  1       Follow-ups after your visit        Follow-up notes from your care team     Return if symptoms worsen or fail to improve.      Who to contact     If you have questions or need follow up information about today's clinic visit or your schedule please contact [facility] directly at [phone].  Normal or non-critical lab and imaging results will be communicated to you by MyChart, letter or phone within 4 business days after the clinic has received the results. If you do not hear from us within 7 days, please contact the clinic through [employer] or phone. If you have a critical or abnormal lab result, we will notify you by phone as soon as possible.  Submit refill requests through [employer] or call your pharmacy and they will forward the refill request to us. Please allow 3 business days for your refill to be completed.          Additional Information About Your Visit        MyChart Information     [employer] gives you secure access to your electronic health record. If you see a primary care provider, you can also send messages to your care team and make appointments. If you have questions, please call your primary care clinic.  If you do not have a primary care provider, please call [phone] and they will assist you.        Care EveryWhere ID     This is your Care EveryWhere ID. This could be used by other organizations to access your [place] medical records  [id number]        Your Vitals Were     Pulse Temperature Respirations Height Pulse Oximetry BMI (Body Mass Index)    76 98.3  F (36.8  C) (Oral) 16 1.626 m (5' 4\") 99% 43.08 kg/m2       " Blood Pressure from Last 3 Encounters:   08/25/17 124/84   06/12/17 148/74   01/30/17 130/78    Weight from Last 3 Encounters:   08/25/17 113.9 kg (251 lb)   06/12/17 114.5 kg (252 lb 6.4 oz)   01/30/17 114.3 kg (252 lb)              Today, you had the following     No orders found for display         Today's Medication Changes          These changes are accurate as of: 8/25/17 11:42 PM.  If you have any questions, ask your nurse or doctor.               Start taking these medicines.        Dose/Directions    ciprofloxacin 500 MG tablet   Commonly known as:  CIPRO   Used for:  Diverticulitis of colon   Started by:  Renee Horta PA-C        Dose:  500 mg   Take 1 tablet (500 mg) by mouth 2 times daily for 10 days   Quantity:  20 tablet   Refills:  0       metroNIDAZOLE 500 MG tablet   Commonly known as:  FLAGYL   Used for:  Diverticulitis of colon   Started by:  Renee Horta PA-C        Dose:  500 mg   Take 1 tablet (500 mg) by mouth 3 times daily for 10 days   Quantity:  30 tablet   Refills:  0            Where to get your medicines      These medications were sent to [facility] [id number] [place] [address] [facility] AT [place] [address] & [facility]  [address] [facility] [address]     Phone:  [phone]     ciprofloxacin 500 MG tablet    metroNIDAZOLE 500 MG tablet                Primary Care Provider Office Phone # Fax #    Lissa Brasher [phone] [address] [address]        Equal Access to Services     [facility]: Hadii aad ku hadasho Soomaali, waaxda luqadaha, qaybta kaalmada adeegyada, krishna batres . So [facility] [phone].    ATENCIÓN: Si habla español, tiene a urias disposición servicios gratuitos de asistencia lingüística. Martine al [phone].    We comply with applicable federal civil rights laws and [place] laws. We do not discriminate on the basis of race, color,  national origin, age, disability sex, sexual orientation or gender identity.            Thank you!     Thank you for choosing [facility]  for your care. Our goal is always to provide you with excellent care. Hearing back from our patients is one way we can continue to improve our services. Please take a few minutes to complete the written survey that you may receive in the mail after your visit with us. Thank you!             Your Updated Medication List - Protect others around you: Learn how to safely use, store and throw away your medicines at www.disposemymeds.org.          This list is accurate as of: 8/25/17 11:42 PM.  Always use your most recent med list.                   Brand Name Dispense Instructions for use Diagnosis    acyclovir 5 % ointment    ZOVIRAX    15 g    Apply  topically 5 times daily.    Recurrent cold sores       albuterol 108 (90 BASE) MCG/ACT Inhaler    PROAIR HFA/PROVENTIL HFA/VENTOLIN HFA     Inhale 2 puffs into the lungs every 6 hours    Intermittent asthma, uncomplicated       aspirin 81 MG tablet     100 tablet    Take 1 tablet by mouth daily.        atenolol 25 MG tablet    TENORMIN    90 tablet    Take 1 tablet (25 mg) by mouth daily    Essential hypertension, benign       ciprofloxacin 500 MG tablet    CIPRO    20 tablet    Take 1 tablet (500 mg) by mouth 2 times daily for 10 days    Diverticulitis of colon       gabapentin 100 MG capsule    NEURONTIN    90 capsule    Take one capsule 3 times daily. May increase to 3 capsules, 3 times daily    Herpes zoster without complication       HYDROcodone-acetaminophen 5-325 MG per tablet    NORCO    20 tablet    Take 1-2 tablets by mouth every 4 hours as needed for moderate to severe pain maximum 6 tablet(s) per day    Herpes zoster without complication       ketoconazole 2 % cream    NIZORAL    30 g    Apply  topically daily as needed.    Tinea corporis, Morbid obesity due to excess calories (H)       metroNIDAZOLE  500 MG tablet    FLAGYL    30 tablet    Take 1 tablet (500 mg) by mouth 3 times daily for 10 days    Diverticulitis of colon       Multi-vitamin Tabs tablet   Generic drug:  multivitamin, therapeutic with minerals      1 po daily        OMEGA 3 550 MG Caps   Generic drug:  LINOLENIC ACID      1200 mg daily    Other and unspecified hyperlipidemia       * omeprazole 20 MG CR capsule    priLOSEC    30 capsule    TAKE 1 CAPSULE(20 MG) BY MOUTH DAILY AS NEEDED    Gastroesophageal reflux disease without esophagitis       * omeprazole 20 MG CR capsule    priLOSEC    90 capsule    TAKE 1 CAPSULE BY MOUTH DAILY AS NEEDED    Gastroesophageal reflux disease without esophagitis       potassium chloride SA 20 MEQ CR tablet    K-DUR/KLOR-CON M    100 tablet    TAKE 1 TABLET(20 MEQ) BY MOUTH DAILY    Essential hypertension, benign       PREMARIN cream   Generic drug:  conjugated estrogens      APPLY A NICKEL SIZED AMOUNT TO VULVA NIGHTLY FOR 2 WEEKS THEN TWICE WEEKLY THEREAFTER        rosuvastatin 10 MG tablet    CRESTOR    90 tablet    Take 1 tablet (10 mg) by mouth daily    Hyperlipidemia LDL goal <130       SYMBICORT 80-4.5 MCG/ACT Inhaler   Generic drug:  budesonide-formoterol     1 Inhaler    1 puff 2 times daily. Pulmonology is filling this        triamterene-hydrochlorothiazide 75-50 MG per tablet    MAXZIDE    90 tablet    TAKE 1/2 TO 1 TABLET BY MOUTH DAILY    Generalized edema, Essential hypertension, benign       TYLENOL 325 MG tablet   Generic drug:  acetaminophen     100    ONE TO TWO TABLETS EVERY 4 TO 6 HOURS AS NEEDED FOR PAIN        VAGIFEM 10 MCG Tabs vaginal tablet   Generic drug:  estradiol           valACYclovir 1000 mg tablet    VALTREX    12 tablet    TAKE 2 TABLETS BY MOUTH TWICE DAILY    Herpes simplex virus (HSV) infection       * Notice:  This list has 2 medication(s) that are the same as other medications prescribed for you. Read the directions carefully, and ask your doctor or other care provider to  review them with you.

## 2017-08-25 NOTE — PROGRESS NOTES
"CC: Hernias, diverticulitis?    History:  Maria Victoria is here today with concern for her diverticulitis. She ate cheese curds (not fried) over the weekend, became very bloated and had significant constipation as well as lower abdominal pain. This has been calming down, but she is still very sore and uncomfortable. She is concerned that she may have an infection needing antibiotics. No fever, sweats, chills.     Maria Victoria has a history of diverticula, and has even had a surgery for the diverticulitis. Has had hernias in the past, and has had some repaired in the past. Sees Dr. Francesca Green for the hernias through the [facility] as she is a complicated case, and they had decided to lose weight before doing more surgery.    PMH, MEDICATIONS, ALLERGIES, SOCIAL AND FAMILY HISTORY in [facility] and reviewed by me personally.      ROS negative other than the symptoms noted above in the HPI.        Examination   /84 (BP Location: Right arm, Cuff Size: Adult Large)  Pulse 76  Temp 98.3  F (36.8  C) (Oral)  Resp 16  Ht 1.626 m (5' 4\")  Wt 113.9 kg (251 lb)  SpO2 99%  BMI 43.08 kg/m2       Constitutional: Sitting comfortably, in no acute distress. Vital signs noted  Eyes: pupils equal round reactive to light and accomodation, extra ocular movements intact  Mouth and throat: without erythema or lesions of the mucosa  Neck:  no adenopathy, trachea midline and normal to palpation  Cardiovascular:  regular rate and rhythm, no murmurs, clicks, or gallops  Respiratory:  normal respiratory rate and rhythm, lungs clear to auscultation  Abdomen: Abdomen soft, very mild tenderness over LLQ. Several small hernias palpated. BS normal. No masses, organomegaly  SKIN: No jaundice/pallor/rash.   Psychiatric: mentation appears normal and affect normal/bright        A/P    ICD-10-CM    1. Diverticulitis of colon K57.32 ciprofloxacin (CIPRO) 500 MG tablet     metroNIDAZOLE (FLAGYL) 500 MG tablet       DISCUSSION: Explained to Maria Victoria that as long as " her episode is getting better, antibiotics should not be necessary. Diverticulitis can often be cleared by diet control, and time. Recommended she stick to clear fluids, and eventually soft, bland, small, frequent meals until this has resolved. She can try icing or heating as well. I will give her scripts for diverticulitis antibiotics if pain gets worse over the weekend, but asked her to take it only if absolutely necessary.    follow up visit: As needed    [facility]

## 2017-08-25 NOTE — NURSING NOTE
Patient is here to talk about her hernia - she had some issues with constipation that cased it to flare.  Pre-Visit Screening not done today.  Pulse - regular  My Chart - accepts    CLASSIFICATION OF OVERWEIGHT AND OBESITY BY BMI                         Obesity Class           BMI(kg/m2)  Underweight                                    < 18.5  Normal                                         18.5-24.9  Overweight                                     25.0-29.9  OBESITY                     I                  30.0-34.9                              II                 35.0-39.9  EXTREME OBESITY             III                >40                             Patient's  BMI Body mass index is 43.08 kg/(m^2).  http://hin.nhlbi.nih.gov/menuplanner/menu.cgi  Questioned patient about current smoking habits.  Pt. has never smoked.

## 2017-08-29 ENCOUNTER — MYC MEDICAL ADVICE (OUTPATIENT)
Dept: FAMILY MEDICINE | Facility: CLINIC | Age: 67
End: 2017-08-29

## 2017-09-10 DIAGNOSIS — K21.9 GASTROESOPHAGEAL REFLUX DISEASE WITHOUT ESOPHAGITIS: ICD-10-CM

## 2017-10-04 ENCOUNTER — ALLIED HEALTH/NURSE VISIT (OUTPATIENT)
Dept: FAMILY MEDICINE | Facility: CLINIC | Age: 67
End: 2017-10-04

## 2017-10-04 DIAGNOSIS — Z23 NEED FOR VACCINATION: Primary | ICD-10-CM

## 2017-10-04 PROCEDURE — G0008 ADMIN INFLUENZA VIRUS VAC: HCPCS | Performed by: FAMILY MEDICINE

## 2017-10-04 PROCEDURE — 90686 IIV4 VACC NO PRSV 0.5 ML IM: CPT | Performed by: FAMILY MEDICINE

## 2017-10-24 ENCOUNTER — TRANSFERRED RECORDS (OUTPATIENT)
Dept: FAMILY MEDICINE | Facility: CLINIC | Age: 67
End: 2017-10-24

## 2017-11-14 ENCOUNTER — OFFICE VISIT (OUTPATIENT)
Dept: FAMILY MEDICINE | Facility: CLINIC | Age: 67
End: 2017-11-14

## 2017-11-14 VITALS
BODY MASS INDEX: 42.99 KG/M2 | HEART RATE: 63 BPM | DIASTOLIC BLOOD PRESSURE: 70 MMHG | WEIGHT: 251.8 LBS | HEIGHT: 64 IN | SYSTOLIC BLOOD PRESSURE: 122 MMHG | OXYGEN SATURATION: 98 % | TEMPERATURE: 98.2 F

## 2017-11-14 DIAGNOSIS — B00.9 HERPES SIMPLEX VIRUS (HSV) INFECTION: ICD-10-CM

## 2017-11-14 DIAGNOSIS — Z79.899 ENCOUNTER FOR LONG-TERM (CURRENT) USE OF MEDICATIONS: Primary | ICD-10-CM

## 2017-11-14 DIAGNOSIS — K21.9 GASTROESOPHAGEAL REFLUX DISEASE WITHOUT ESOPHAGITIS: ICD-10-CM

## 2017-11-14 DIAGNOSIS — E78.5 HYPERLIPIDEMIA LDL GOAL <130: ICD-10-CM

## 2017-11-14 DIAGNOSIS — R60.1 GENERALIZED EDEMA: ICD-10-CM

## 2017-11-14 DIAGNOSIS — I10 ESSENTIAL HYPERTENSION, BENIGN: ICD-10-CM

## 2017-11-14 DIAGNOSIS — E66.01 MORBID OBESITY (H): ICD-10-CM

## 2017-11-14 PROCEDURE — 99213 OFFICE O/P EST LOW 20 MIN: CPT | Performed by: PHYSICIAN ASSISTANT

## 2017-11-14 RX ORDER — ROSUVASTATIN CALCIUM 10 MG/1
10 TABLET, COATED ORAL DAILY
Qty: 90 TABLET | Refills: 1 | Status: SHIPPED | OUTPATIENT
Start: 2017-11-14 | End: 2018-05-23

## 2017-11-14 RX ORDER — VALACYCLOVIR HYDROCHLORIDE 1 G/1
TABLET, FILM COATED ORAL
Qty: 12 TABLET | Refills: 1 | Status: SHIPPED | OUTPATIENT
Start: 2017-11-14 | End: 2018-07-09

## 2017-11-14 RX ORDER — ATENOLOL 25 MG/1
25 TABLET ORAL DAILY
Qty: 90 TABLET | Refills: 1 | Status: SHIPPED | OUTPATIENT
Start: 2017-11-14 | End: 2018-07-09

## 2017-11-14 RX ORDER — TRIAMTERENE AND HYDROCHLOROTHIAZIDE 75; 50 MG/1; MG/1
TABLET ORAL
Qty: 90 TABLET | Refills: 1 | Status: SHIPPED | OUTPATIENT
Start: 2017-11-14 | End: 2018-07-09

## 2017-11-14 RX ORDER — POTASSIUM CHLORIDE 1500 MG/1
TABLET, EXTENDED RELEASE ORAL
Qty: 100 TABLET | Refills: 1 | Status: SHIPPED | OUTPATIENT
Start: 2017-11-14 | End: 2018-06-14

## 2017-11-14 NOTE — PROGRESS NOTES
"CC: Medication refill    History:  1. Gastroesophageal reflux disease without esophagitis  Takes omeprazole 1 tablet daily, and can tell symptoms worsen when she doesn't take it.     2. Essential hypertension, benign  Stays active with gym membership. No chest pain, palpitations, shortness of breath, vision changes. Tries to eat healthy. Gets annual eye exams.   - potassium chloride SA (K-DUR/KLOR-CON M) 20 MEQ CR tablet; TAKE 1 TABLET(20 MEQ) BY MOUTH DAILY   - atenolol (TENORMIN) 25 MG tablet; Take 1 tablet (25 mg) by mouth daily   - triamterene-hydrochlorothiazide (MAXZIDE) 75-50 MG per tablet; TAKE 1/2 TO 1 TABLET BY MOUTH DAILY      3. Generalized edema  Seems to be controlled on this medication.  - triamterene-hydrochlorothiazide (MAXZIDE) 75-50 MG per tablet; TAKE 1/2 TO 1 TABLET BY MOUTH DAILY  Dispense: 90 tablet; Refill: 1      4. Hyperlipidemia LDL goal <130  - rosuvastatin (CRESTOR) 10 MG tablet; Take 1 tablet (10 mg) by mouth daily  Dispense: 90 tablet; Refill: 1  - Comprehensive metabolic panel; Standing  - VENOUS COLLECTION; Standing  - Lipid Profile (QUEST); Standing    5. Herpes simplex virus (HSV) infection  Uses as needed  - valACYclovir (VALTREX) 1000 mg tablet; TAKE 2 TABLETS BY MOUTH TWICE DAILY  Dispense: 12 tablet; Refill: 1      PMH, MEDICATIONS, ALLERGIES, SOCIAL AND FAMILY HISTORY in [facility] and reviewed by me personally.      ROS negative other than the symptoms noted above in the HPI.        Examination   /70 (BP Location: Left arm, Patient Position: Chair, Cuff Size: Adult Large)  Pulse 63  Temp 98.2  F (36.8  C) (Oral)  Ht 1.619 m (5' 3.75\")  Wt 114.2 kg (251 lb 12.8 oz)  SpO2 98%  Breastfeeding? No  BMI 43.56 kg/m2       Constitutional: Sitting comfortably, in no acute distress. Vital signs noted  Eyes: pupils equal round reactive to light and accomodation, extra ocular movements intact  Cardiovascular:  regular rate and rhythm, no murmurs, clicks, or gallops  Respiratory:  " normal respiratory rate and rhythm, lungs clear to auscultation  SKIN: No jaundice/pallor/rash.   Psychiatric: mentation appears normal and affect normal/bright        A/P    ICD-10-CM    1. Encounter for long-term (current) use of medications Z79.899    2. Gastroesophageal reflux disease without esophagitis K21.9 ranitidine (ZANTAC) 150 MG capsule   3. Essential hypertension, benign I10 potassium chloride SA (K-DUR/KLOR-CON M) 20 MEQ CR tablet     atenolol (TENORMIN) 25 MG tablet     triamterene-hydrochlorothiazide (MAXZIDE) 75-50 MG per tablet     Comprehensive metabolic panel     VENOUS COLLECTION   4. Generalized edema R60.1 triamterene-hydrochlorothiazide (MAXZIDE) 75-50 MG per tablet     Comprehensive metabolic panel     VENOUS COLLECTION   5. Hyperlipidemia LDL goal <130 E78.5 rosuvastatin (CRESTOR) 10 MG tablet     Comprehensive metabolic panel     VENOUS COLLECTION     Lipid Profile (QUEST)   6. Herpes simplex virus (HSV) infection B00.9 valACYclovir (VALTREX) 1000 mg tablet   7. Morbid obesity (H) E66.01    8. Class 3 obesity without serious comorbidity with body mass index (BMI) of 40.0 to 44.9 in adult, unspecified obesity type (H) E66.9     Z68.41        DISCUSSION: Maria Victoria is doing very well on her current medications. Did have her switch from omeprazole to ranitidine 1-2 tablets daily with large meals. This is a safer option at this point than omeprazole. Other medications were not changed. Encouraged continued work on diet and exercise with goal of weight loss.     Fasting labs put in standing.     follow up visit: 6 months    Renee Horta PA-C  [facility]

## 2017-11-14 NOTE — MR AVS SNAPSHOT
After Visit Summary   11/14/2017    Maria Victoria Arcos    MRN: [id number]           Patient Information     Date Of Birth          1950        Visit Information        Provider Department      11/14/2017 2:00 PM Renee Horta PA-C [facility]        Today's Diagnoses     Encounter for long-term (current) use of medications    -  1    Gastroesophageal reflux disease without esophagitis        Essential hypertension, benign        Generalized edema        Hyperlipidemia LDL goal <130        Herpes simplex virus (HSV) infection        Morbid obesity (H)        Class 3 obesity without serious comorbidity with body mass index (BMI) of 40.0 to 44.9 in adult, unspecified obesity type (H)           Follow-ups after your visit        Follow-up notes from your care team     Return in about 6 months (around 5/14/2018) for Lab Work, Routine Visit, BP Recheck.      Your next 10 appointments already scheduled     Nov 15, 2017  9:00 AM CST   Lab Appointment with [facility] LAB/XRAY   [facility] ([facility])    [address]  [address]  [facility] [id number] [phone]              Future tests that were ordered for you today     Open Standing Orders        Priority Remaining Interval Expires Ordered    Comprehensive metabolic panel Routine 1/1 11/14/2018 11/14/2017    VENOUS COLLECTION Routine 1/1 11/14/2018 11/14/2017    Lipid Profile ([facility]) Routine 1/1 11/14/2018 11/14/2017            Who to contact     If you have questions or need follow up information about today's clinic visit or your schedule please contact [facility] directly at [phone].  Normal or non-critical lab and imaging results will be communicated to you by MyChart, letter or phone within 4 business days after the clinic has received the results. If you do not hear from us within 7 days, please contact the clinic through [employer] or  "phone. If you have a critical or abnormal lab result, we will notify you by phone as soon as possible.  Submit refill requests through [employer] or call your pharmacy and they will forward the refill request to us. Please allow 3 business days for your refill to be completed.          Additional Information About Your Visit        [employer] Information     [employer] gives you secure access to your electronic health record. If you see a primary care provider, you can also send messages to your care team and make appointments. If you have questions, please call your primary care clinic.  If you do not have a primary care provider, please call [phone] and they will assist you.        Care EveryWhere ID     This is your Care EveryWhere ID. This could be used by other organizations to access your [place] medical records  [id number]        Your Vitals Were     Pulse Temperature Height Pulse Oximetry Breastfeeding? BMI (Body Mass Index)    63 98.2  F (36.8  C) (Oral) 1.619 m (5' 3.75\") 98% No 43.56 kg/m2       Blood Pressure from Last 3 Encounters:   11/14/17 122/70   08/25/17 124/84   06/12/17 148/74    Weight from Last 3 Encounters:   11/14/17 114.2 kg (251 lb 12.8 oz)   08/25/17 113.9 kg (251 lb)   06/12/17 114.5 kg (252 lb 6.4 oz)                 Today's Medication Changes          These changes are accurate as of: 11/14/17  5:25 PM.  If you have any questions, ask your nurse or doctor.               Start taking these medicines.        Dose/Directions    ranitidine 150 MG capsule   Commonly known as:  ZANTAC   Used for:  Gastroesophageal reflux disease without esophagitis   Started by:  Renee Horta PA-C        Dose:  150 mg   Take 1 capsule (150 mg) by mouth daily With dinner   Quantity:  30 capsule   Refills:  5         These medicines have changed or have updated prescriptions.        Dose/Directions    potassium chloride SA 20 MEQ CR tablet   Commonly known as:  K-DUR/KLOR-CON M   This may have " changed:  See the new instructions.   Used for:  Essential hypertension, benign   Changed by:  Renee Horta PA-C        TAKE 1 TABLET(20 MEQ) BY MOUTH DAILY   Quantity:  100 tablet   Refills:  1       valACYclovir 1000 mg tablet   Commonly known as:  VALTREX   This may have changed:  See the new instructions.   Used for:  Herpes simplex virus (HSV) infection   Changed by:  Renee Horta PA-C        TAKE 2 TABLETS BY MOUTH TWICE DAILY   Quantity:  12 tablet   Refills:  1            Where to get your medicines      These medications were sent to [facility] [id number] [place] [address] [address] [place] [address] & [facility]  [address] [facility] [address]     Phone:  [phone]     atenolol 25 MG tablet    potassium chloride SA 20 MEQ CR tablet    ranitidine 150 MG capsule    rosuvastatin 10 MG tablet    triamterene-hydrochlorothiazide 75-50 MG per tablet    valACYclovir 1000 mg tablet                Primary Care Provider Office Phone # Fax #    Lissa Brasher [phone] [address]        Equal Access to Services     [facility] AH: Hadii aad ku hadasho Soomaali, waaxda luqadaha, qaybta kaalmada adeegyada, waxay idiin hayaan adeeg kharaaubrey batres ah. So [facility] [phone].    ATENCIÓN: Si habla español, tiene a urias disposición servicios gratuitos de asistencia lingüística. Robyame al [phone].    We comply with applicable federal civil rights laws and [place] laws. We do not discriminate on the basis of race, color, national origin, age, disability, sex, sexual orientation, or gender identity.            Thank you!     Thank you for choosing [facility]  for your care. Our goal is always to provide you with excellent care. Hearing back from our patients is one way we can continue to improve our services. Please take a few minutes to complete the written survey that you may  receive in the mail after your visit with us. Thank you!             Your Updated Medication List - Protect others around you: Learn how to safely use, store and throw away your medicines at www.disposemymeds.org.          This list is accurate as of: 11/14/17  5:25 PM.  Always use your most recent med list.                   Brand Name Dispense Instructions for use Diagnosis    acyclovir 5 % ointment    ZOVIRAX    15 g    Apply  topically 5 times daily.    Recurrent cold sores       albuterol 108 (90 BASE) MCG/ACT Inhaler    PROAIR HFA/PROVENTIL HFA/VENTOLIN HFA     Inhale 2 puffs into the lungs every 6 hours    Intermittent asthma, uncomplicated       aspirin 81 MG tablet     100 tablet    Take 1 tablet by mouth daily.        atenolol 25 MG tablet    TENORMIN    90 tablet    Take 1 tablet (25 mg) by mouth daily    Essential hypertension, benign       ketoconazole 2 % cream    NIZORAL    30 g    Apply  topically daily as needed.    Tinea corporis, Morbid obesity due to excess calories (H)       Multi-vitamin Tabs tablet   Generic drug:  multivitamin, therapeutic with minerals      1 po daily        OMEGA 3 550 MG Caps   Generic drug:  LINOLENIC ACID      1200 mg daily    Other and unspecified hyperlipidemia       * omeprazole 20 MG CR capsule    priLOSEC    30 capsule    TAKE 1 CAPSULE(20 MG) BY MOUTH DAILY AS NEEDED    Gastroesophageal reflux disease without esophagitis       * omeprazole 20 MG CR capsule    priLOSEC    90 capsule    TAKE 1 CAPSULE BY MOUTH EVERY DAY AS NEEDED    Gastroesophageal reflux disease without esophagitis       potassium chloride SA 20 MEQ CR tablet    K-DUR/KLOR-CON M    100 tablet    TAKE 1 TABLET(20 MEQ) BY MOUTH DAILY    Essential hypertension, benign       PREMARIN cream   Generic drug:  conjugated estrogens      APPLY A NICKEL SIZED AMOUNT TO VULVA NIGHTLY FOR 2 WEEKS THEN TWICE WEEKLY THEREAFTER        ranitidine 150 MG capsule    ZANTAC    30 capsule    Take 1 capsule (150 mg) by  mouth daily With dinner    Gastroesophageal reflux disease without esophagitis       rosuvastatin 10 MG tablet    CRESTOR    90 tablet    Take 1 tablet (10 mg) by mouth daily    Hyperlipidemia LDL goal <130       SYMBICORT 80-4.5 MCG/ACT Inhaler   Generic drug:  budesonide-formoterol     1 Inhaler    1 puff 2 times daily. Pulmonology is filling this        triamterene-hydrochlorothiazide 75-50 MG per tablet    MAXZIDE    90 tablet    TAKE 1/2 TO 1 TABLET BY MOUTH DAILY    Generalized edema, Essential hypertension, benign       TYLENOL 325 MG tablet   Generic drug:  acetaminophen     100    ONE TO TWO TABLETS EVERY 4 TO 6 HOURS AS NEEDED FOR PAIN        VAGIFEM 10 MCG Tabs vaginal tablet   Generic drug:  estradiol           valACYclovir 1000 mg tablet    VALTREX    12 tablet    TAKE 2 TABLETS BY MOUTH TWICE DAILY    Herpes simplex virus (HSV) infection       * Notice:  This list has 2 medication(s) that are the same as other medications prescribed for you. Read the directions carefully, and ask your doctor or other care provider to review them with you.

## 2017-11-15 ENCOUNTER — TELEPHONE (OUTPATIENT)
Dept: FAMILY MEDICINE | Facility: CLINIC | Age: 67
End: 2017-11-15

## 2017-11-15 DIAGNOSIS — I10 ESSENTIAL HYPERTENSION, BENIGN: ICD-10-CM

## 2017-11-15 DIAGNOSIS — E78.5 HYPERLIPIDEMIA LDL GOAL <130: ICD-10-CM

## 2017-11-15 DIAGNOSIS — R60.1 GENERALIZED EDEMA: ICD-10-CM

## 2017-11-15 PROCEDURE — 80061 LIPID PANEL: CPT | Mod: 90 | Performed by: PHYSICIAN ASSISTANT

## 2017-11-15 PROCEDURE — 80053 COMPREHEN METABOLIC PANEL: CPT | Mod: 90 | Performed by: PHYSICIAN ASSISTANT

## 2017-11-15 PROCEDURE — 36415 COLL VENOUS BLD VENIPUNCTURE: CPT | Performed by: PHYSICIAN ASSISTANT

## 2017-11-16 LAB
ALBUMIN SERPL-MCNC: 4.3 G/DL (ref 3.6–5.1)
ALBUMIN/GLOB SERPL: 1.7 (CALC) (ref 1–2.5)
ALP SERPL-CCNC: 45 U/L (ref 33–130)
ALT SERPL-CCNC: 19 U/L (ref 6–29)
AST SERPL-CCNC: 17 U/L (ref 10–35)
BILIRUB SERPL-MCNC: 0.7 MG/DL (ref 0.2–1.2)
BUN SERPL-MCNC: 20 MG/DL (ref 7–25)
BUN/CREATININE RATIO: ABNORMAL (CALC) (ref 6–22)
CALCIUM SERPL-MCNC: 9.5 MG/DL (ref 8.6–10.4)
CHLORIDE SERPLBLD-SCNC: 99 MMOL/L (ref 98–110)
CHOLEST SERPL-MCNC: 149 MG/DL
CHOLEST/HDLC SERPL: 3.4 (CALC)
CO2 SERPL-SCNC: 26 MMOL/L (ref 20–31)
CREAT SERPL-MCNC: 0.82 MG/DL (ref 0.5–0.99)
EGFR AFRICAN AMERICAN - QUEST: 86 ML/MIN/1.73M2
GFR SERPL CREATININE-BSD FRML MDRD: 75 ML/MIN/1.73M2
GLOBULIN, CALCULATED - QUEST: 2.6 G/DL (CALC) (ref 1.9–3.7)
GLUCOSE - QUEST: 102 MG/DL (ref 65–99)
HDLC SERPL-MCNC: 44 MG/DL
LDLC SERPL CALC-MCNC: 78 MG/DL (CALC)
NONHDLC SERPL-MCNC: 105 MG/DL (CALC)
POTASSIUM SERPL-SCNC: 3.7 MMOL/L (ref 3.5–5.3)
PROT SERPL-MCNC: 6.9 G/DL (ref 6.1–8.1)
SODIUM SERPL-SCNC: 137 MMOL/L (ref 135–146)
TRIGL SERPL-MCNC: 169 MG/DL

## 2018-01-12 ENCOUNTER — TRANSFERRED RECORDS (OUTPATIENT)
Dept: FAMILY MEDICINE | Facility: CLINIC | Age: 68
End: 2018-01-12

## 2018-01-15 ENCOUNTER — TRANSFERRED RECORDS (OUTPATIENT)
Dept: FAMILY MEDICINE | Facility: CLINIC | Age: 68
End: 2018-01-15

## 2018-01-23 DIAGNOSIS — I10 ESSENTIAL HYPERTENSION, BENIGN: ICD-10-CM

## 2018-01-23 RX ORDER — ATENOLOL 25 MG/1
TABLET ORAL
Qty: 90 TABLET | Refills: 0 | OUTPATIENT
Start: 2018-01-23

## 2018-01-23 NOTE — TELEPHONE ENCOUNTER
Refused Prescriptions:                       Disp   Refills    atenolol (TENORMIN) 25 MG tablet [Pharmacy*90 tab*0        Sig: TAKE 1 TABLET(25 MG) BY MOUTH DAILY  Refused By: KENNEY BALL  Reason for Refusal: Request already responded to by other means (phone, fax, etc.)  Reason for Refusal Comment: refilled 11- for six months    [place]  [phone] (home)

## 2018-02-05 ENCOUNTER — TRANSFERRED RECORDS (OUTPATIENT)
Dept: HEALTH INFORMATION MANAGEMENT | Facility: CLINIC | Age: 68
End: 2018-02-05

## 2018-04-05 ENCOUNTER — OFFICE VISIT (OUTPATIENT)
Dept: FAMILY MEDICINE | Facility: CLINIC | Age: 68
End: 2018-04-05

## 2018-04-05 VITALS
DIASTOLIC BLOOD PRESSURE: 74 MMHG | HEART RATE: 76 BPM | WEIGHT: 256.8 LBS | SYSTOLIC BLOOD PRESSURE: 140 MMHG | BODY MASS INDEX: 44.43 KG/M2 | TEMPERATURE: 98.7 F | OXYGEN SATURATION: 98 %

## 2018-04-05 DIAGNOSIS — R10.84 ABDOMINAL PAIN, GENERALIZED: Primary | ICD-10-CM

## 2018-04-05 LAB
% GRANULOCYTES: 62.5 %
HCT VFR BLD AUTO: 41.4 % (ref 35–47)
HEMOGLOBIN: 14.1 G/DL (ref 11.7–15.7)
LYMPHOCYTES NFR BLD AUTO: 29.5 %
MCH RBC QN AUTO: 30.1 PG (ref 26–33)
MCHC RBC AUTO-ENTMCNC: 34.1 G/DL (ref 31–36)
MCV RBC AUTO: 88.5 FL (ref 78–100)
MONOCYTES NFR BLD AUTO: 8 %
PLATELET COUNT - QUEST: 170 10^9/L (ref 150–375)
RBC # BLD AUTO: 4.68 10*12/L (ref 3.8–5.2)
WBC # BLD AUTO: 8.9 10*9/L (ref 4–11)

## 2018-04-05 PROCEDURE — 36415 COLL VENOUS BLD VENIPUNCTURE: CPT | Performed by: PHYSICIAN ASSISTANT

## 2018-04-05 PROCEDURE — 99213 OFFICE O/P EST LOW 20 MIN: CPT | Performed by: PHYSICIAN ASSISTANT

## 2018-04-05 PROCEDURE — 85025 COMPLETE CBC W/AUTO DIFF WBC: CPT | Performed by: PHYSICIAN ASSISTANT

## 2018-04-05 NOTE — MR AVS SNAPSHOT
After Visit Summary   4/5/2018    Maria Victoria Arcos    MRN: [id number]           Patient Information     Date Of Birth          1950        Visit Information        Provider Department      4/5/2018 5:00 PM Jacqui Landers PA [facility]        Today's Diagnoses     Abdominal pain, generalized    -  1       Follow-ups after your visit        Additional Services     GASTROENTEROLOGY ADULT REF CONSULT ONLY       Preferred Location: [facility] [phone]      Please be aware that coverage of these services is subject to the terms and limitations of your health insurance plan.  Call member services at your health plan with any benefit or coverage questions.  Any procedures must be performed at a [facility] facility OR coordinated by your clinic's referral office.    Please bring the following with you to your appointment:    (1) Any X-Rays, CTs or MRIs which have been performed.  Contact the facility where they were done to arrange for  prior to your scheduled appointment.    (2) List of current medications   (3) This referral request   (4) Any documents/labs given to you for this referral                  Who to contact     If you have questions or need follow up information about today's clinic visit or your schedule please contact [facility] directly at [phone].  Normal or non-critical lab and imaging results will be communicated to you by MyChart, letter or phone within 4 business days after the clinic has received the results. If you do not hear from us within 7 days, please contact the clinic through MyChart or phone. If you have a critical or abnormal lab result, we will notify you by phone as soon as possible.  Submit refill requests through [employer] or call your pharmacy and they will forward the refill request to us. Please allow 3 business days for your refill to be completed.          Additional Information About  Your Visit        [employer] Information     [employer] gives you secure access to your electronic health record. If you see a primary care provider, you can also send messages to your care team and make appointments. If you have questions, please call your primary care clinic.  If you do not have a primary care provider, please call [phone] and they will assist you.        Care EveryWhere ID     This is your Care EveryWhere ID. This could be used by other organizations to access your [place] medical records  [id number]        Your Vitals Were     Pulse Temperature Pulse Oximetry Breastfeeding? BMI (Body Mass Index)       76 98.7  F (37.1  C) (Oral) 98% No 44.43 kg/m2        Blood Pressure from Last 3 Encounters:   04/05/18 140/74   11/14/17 122/70   08/25/17 124/84    Weight from Last 3 Encounters:   04/05/18 116.5 kg (256 lb 12.8 oz)   11/14/17 114.2 kg (251 lb 12.8 oz)   08/25/17 113.9 kg (251 lb)              We Performed the Following     CL AFF HEMOGRAM/PLATE/DIFF ([facility])     GASTROENTEROLOGY ADULT REF CONSULT ONLY     VENOUS COLLECTION        Primary Care Provider Office Phone # Fax #    Lissa Brasher [phone]       [facility] [address] [address]        Equal Access to Services     WEI RODRIGUEZ : Hadii aad ku hadasho Soomaali, waaxda luqadaha, qaybta kaalmada adeegyada, waxay antione haycamila batres . So [facility] [phone].    ATENCIÓN: Si habla español, tiene a urias disposición servicios gratuitos de asistencia lingüística. Llame al [phone].    We comply with applicable federal civil rights laws and [place] laws. We do not discriminate on the basis of race, color, national origin, age, disability, sex, sexual orientation, or gender identity.            Thank you!     Thank you for choosing [facility]  for your care. Our goal is always to provide you with excellent care. Hearing back from our patients is one way we can continue  to improve our services. Please take a few minutes to complete the written survey that you may receive in the mail after your visit with us. Thank you!             Your Updated Medication List - Protect others around you: Learn how to safely use, store and throw away your medicines at www.disposemymeds.org.          This list is accurate as of 4/5/18  5:32 PM.  Always use your most recent med list.                   Brand Name Dispense Instructions for use Diagnosis    acyclovir 5 % ointment    ZOVIRAX    15 g    Apply  topically 5 times daily.    Recurrent cold sores       albuterol 108 (90 BASE) MCG/ACT Inhaler    PROAIR HFA/PROVENTIL HFA/VENTOLIN HFA     Inhale 2 puffs into the lungs every 6 hours    Intermittent asthma, uncomplicated       aspirin 81 MG tablet     100 tablet    Take 1 tablet by mouth daily.        atenolol 25 MG tablet    TENORMIN    90 tablet    Take 1 tablet (25 mg) by mouth daily    Essential hypertension, benign       ketoconazole 2 % cream    NIZORAL    30 g    Apply  topically daily as needed.    Tinea corporis, Morbid obesity due to excess calories (H)       Multi-vitamin Tabs tablet   Generic drug:  multivitamin, therapeutic with minerals      1 po daily        OMEGA 3 550 MG Caps   Generic drug:  LINOLENIC ACID      1200 mg daily    Other and unspecified hyperlipidemia       * omeprazole 20 MG CR capsule    priLOSEC    30 capsule    TAKE 1 CAPSULE(20 MG) BY MOUTH DAILY AS NEEDED    Gastroesophageal reflux disease without esophagitis       * omeprazole 20 MG CR capsule    priLOSEC    90 capsule    TAKE 1 CAPSULE BY MOUTH EVERY DAY AS NEEDED    Gastroesophageal reflux disease without esophagitis       potassium chloride SA 20 MEQ CR tablet    K-DUR/KLOR-CON M    100 tablet    TAKE 1 TABLET(20 MEQ) BY MOUTH DAILY    Essential hypertension, benign       PREMARIN cream   Generic drug:  conjugated estrogens      APPLY A NICKEL SIZED AMOUNT TO VULVA NIGHTLY FOR 2 WEEKS THEN TWICE WEEKLY  THEREAFTER        ranitidine 150 MG capsule    ZANTAC    30 capsule    Take 1 capsule (150 mg) by mouth daily With dinner    Gastroesophageal reflux disease without esophagitis       rosuvastatin 10 MG tablet    CRESTOR    90 tablet    Take 1 tablet (10 mg) by mouth daily    Hyperlipidemia LDL goal <130       SYMBICORT 80-4.5 MCG/ACT Inhaler   Generic drug:  budesonide-formoterol     1 Inhaler    1 puff 2 times daily. Pulmonology is filling this        triamterene-hydrochlorothiazide 75-50 MG per tablet    MAXZIDE    90 tablet    TAKE 1/2 TO 1 TABLET BY MOUTH DAILY    Generalized edema, Essential hypertension, benign       TYLENOL 325 MG tablet   Generic drug:  acetaminophen     100    ONE TO TWO TABLETS EVERY 4 TO 6 HOURS AS NEEDED FOR PAIN        VAGIFEM 10 MCG Tabs vaginal tablet   Generic drug:  estradiol           valACYclovir 1000 mg tablet    VALTREX    12 tablet    TAKE 2 TABLETS BY MOUTH TWICE DAILY    Herpes simplex virus (HSV) infection       * Notice:  This list has 2 medication(s) that are the same as other medications prescribed for you. Read the directions carefully, and ask your doctor or other care provider to review them with you.

## 2018-04-05 NOTE — PROGRESS NOTES
SUBJECTIVE:                                                    Maria Victoria Arcos is a 67 year old female who presents to clinic today for the following health issues:    Maria Victoria is here with abdominal pain.  Sx began on Sunday - bilateral lower abdominal pain.  Gets distended, nauseous - no vomiting, diarrhea/shaking then sleep it off.  Intermittent gastroparesis.     Has hx of incisional hernias.  Surgeon at  wants to do abdominoplasty instead of hernia repair  Timing is elective for surgery.     Pain is described as burning sensation.   Under the surface of the skin   Pain was more deep Sunday or Monday  Now feels like bruising.    Not related to BM.  BM today normal, medium/soft    Clear liquids Monday/Tues  Yesterday - Malt o Meal.   Diverticulosis rectum causing mass/bowel into bladder.    No hx of UTIs  No dysuria  No vaginal bleeding  No melena   No blood in stool  No fevers.       2 years ago hernia repair from surgeries  Strangulated bowel in the past.             BP Readings from Last 6 Encounters:   04/05/18 140/74   11/14/17 122/70   08/25/17 124/84   06/12/17 148/74   01/30/17 130/78   01/17/17 120/80         ROS:  C: NEGATIVE for fever, chills, change in weight  Eyes: NEGATIVE for vision changes or irritation  Ears: NEGATIVE for pain, discharge, decreased hearing  Nose: NEGATIVE for rhinorrhea, epistaxis or congestion  Mouth: NEGATIVE for ulcerations, sore throat.   R: NEGATIVE for significant cough or SOB  CV: NEGATIVE for chest pain, palpitations or peripheral edema  : NEGATIVE for frequency, dysuria, or hematuria        BP Readings from Last 3 Encounters:   04/05/18 140/74   11/14/17 122/70   08/25/17 124/84    Wt Readings from Last 3 Encounters:   04/05/18 116.5 kg (256 lb 12.8 oz)   11/14/17 114.2 kg (251 lb 12.8 oz)   08/25/17 113.9 kg (251 lb)            Patient Active Problem List   Diagnosis     Generalized osteoarthrosis, unspecified site     Herpes simplex virus (HSV) infection     Sleep  apnea     Intermittent asthma     Impaired fasting glucose     Factor V Leiden mutation (H)     Health Care Home     HCD (health care directive)     Hyperlipidemia LDL goal <130     Rosacea     Basal cell carcinoma of face     Ventral hernia     ACP (advance care planning)     Morbid obesity due to excess calories (H)     Cranial third nerve paresis, left/ diplopia     Gastroesophageal reflux disease without esophagitis     Essential hypertension, benign     Class 3 obesity without serious comorbidity with body mass index (BMI) of 40.0 to 44.9 in adult, unspecified obesity type (H)     Past Surgical History:   Procedure Laterality Date     APPENDECTOMY       CARPAL TUNNEL RELEASE RT/LT       CHOLECYSTECTOMY, LAPOROSCOPIC      Cholecystectomy, Laparoscopic     COLONOSCOPY  9/04    diverticulosis     D & C       EYE SURGERY       HERNIORRHAPHY VENTRAL N/A 11/15/2015    Procedure: HERNIORRHAPHY VENTRAL;  Surgeon: Rell Green MD;  Location: [facility]     HYSTERECTOMY, SMITA      one ovary left     INCISE FINGER TENDON SHEATH      right middle finger     SURGICAL HISTORY OF -   6/08    pubovaginal sling     SURGICAL HISTORY OF -       left knee replacement     SURGICAL HISTORY OF -   2006    right thumb joint replacement     SURGICAL HISTORY OF -   1/09    left thumb joint replacement     SURGICAL HISTORY OF -   11/4/13    resection of urachal mass, parital cystectomy sigmoid colectomy. 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     SURGICAL HISTORY OF -   Nov 2013    cystoscopic removal of bladdermass prior to surgery on 11/4/13     SURGICAL HISTORY OF -   4/15    Moh's for basal cell ca on foreheard       Social History   Substance Use Topics     Smoking status: Never Smoker     Smokeless tobacco: Never Used     Alcohol use Yes      Comment: social; maybe 1 per week     Family History   Problem Relation Age of Onset     C.A.D. Father      under age 55     DIABETES Father      Hypertension Father      CEREBROVASCULAR DISEASE  Father      Neurologic Disorder Father      dementia; Parkinson's     DIABETES Mother      Hypertension Mother      CEREBROVASCULAR DISEASE Mother      Circulatory Mother      ITP     GASTROINTESTINAL DISEASE Mother      severe diverticulitis     Lipids Sister      Obesity Father      Obesity Mother      Obesity Sister      Arthritis Sister      HEART DISEASE Mother      paroxysmal a fib     Arthritis Mother      Allergies Mother      Blood Disease Mother      CEREBROVASCULAR DISEASE Mother      Breast Cancer Maternal Aunt      Breast Cancer Maternal Aunt          Current Outpatient Prescriptions   Medication Sig Dispense Refill     potassium chloride SA (K-DUR/KLOR-CON M) 20 MEQ CR tablet TAKE 1 TABLET(20 MEQ) BY MOUTH DAILY 100 tablet 1     atenolol (TENORMIN) 25 MG tablet Take 1 tablet (25 mg) by mouth daily 90 tablet 1     triamterene-hydrochlorothiazide (MAXZIDE) 75-50 MG per tablet TAKE 1/2 TO 1 TABLET BY MOUTH DAILY 90 tablet 1     rosuvastatin (CRESTOR) 10 MG tablet Take 1 tablet (10 mg) by mouth daily 90 tablet 1     valACYclovir (VALTREX) 1000 mg tablet TAKE 2 TABLETS BY MOUTH TWICE DAILY 12 tablet 1     ranitidine (ZANTAC) 150 MG capsule Take 1 capsule (150 mg) by mouth daily With dinner 30 capsule 5     omeprazole (PRILOSEC) 20 MG CR capsule TAKE 1 CAPSULE BY MOUTH EVERY DAY AS NEEDED 90 capsule 0     omeprazole (PRILOSEC) 20 MG CR capsule TAKE 1 CAPSULE(20 MG) BY MOUTH DAILY AS NEEDED 30 capsule 0     PREMARIN cream APPLY A NICKEL SIZED AMOUNT TO VULVA NIGHTLY FOR 2 WEEKS THEN TWICE WEEKLY THEREAFTER  4     VAGIFEM 10 MCG TABS vaginal tablet        albuterol (PROAIR HFA, PROVENTIL HFA, VENTOLIN HFA) 108 (90 BASE) MCG/ACT inhaler Inhale 2 puffs into the lungs every 6 hours       ketoconazole (NIZORAL) 2 % cream Apply  topically daily as needed. 30 g prn     acyclovir (ZOVIRAX) 5 % ointment Apply  topically 5 times daily. 15 g prn     budesonide-formoterol (SYMBICORT) 80-4.5 MCG/ACT inhaler 1 puff 2 times  daily. Pulmonology is filling this 1 Inhaler      aspirin 81 MG tablet Take 1 tablet by mouth daily. 100 tablet 3     MULTI-VITAMIN OR TABS 1 po daily  0     TYLENOL 325 MG OR TABS ONE TO TWO TABLETS EVERY 4 TO 6 HOURS AS NEEDED FOR PAIN 100 0     OMEGA 3 550 MG OR CAPS 1200 mg daily  0       Allergies   Allergen Reactions     Advicor Hives     Is a combination medication of niacin and lovastatin     Hylan G-F 20 Swelling     Meperidine Hcl Hives     Methocarbamol Hives       OBJECTIVE:                                                    /74 (BP Location: Right arm, Patient Position: Chair, Cuff Size: Adult Large)  Pulse 76  Temp 98.7  F (37.1  C) (Oral)  Wt 116.5 kg (256 lb 12.8 oz)  SpO2 98%  Breastfeeding? No  BMI 44.43 kg/m2 Body mass index is 44.43 kg/(m^2).     GENERAL: alert and no distress  RESP: lungs clear to auscultation - no crackles, no rhonchi, no wheezes  CV: regular rates and rhythm, normal S1 S2, no S3 or S4 and no murmur, no click or rub -  Abdomen: Normal bowel sounds. Soft, no masses, or organomegaly. Non-tender. No guarding, no rebound tenderness.            ASSESSMENT/PLAN:                                                      1. Abdominal pain, generalized  48 hours clear liquids  RTC if not improving  Whole 30 advised  2nd opinion at [facility] recommended  - GASTROENTEROLOGY ADULT REF CONSULT ONLY  - CL AFF HEMOGRAM/PLATE/DIFF (BFP)  - VENOUS COLLECTION    ED if worsening and we are closed    Jacqui Landers PA-C  [facility]

## 2018-04-05 NOTE — NURSING NOTE
Maria Victoria is here for abdominal pain that Began sunday, lower abdominal pain spands across abdomin under navel    Pre-Visit Screening :  Immunizations : up to date    Colonoscopy : is up to date  Mammogram : is up to date  Asthma Action Test/Plan : na  PHQ9/GAD7 :  Na    Pulse - regular  My Chart - accepts    CLASSIFICATION OF OVERWEIGHT AND OBESITY BY BMI                         Obesity Class           BMI(kg/m2)  Underweight                                    < 18.5  Normal                                         18.5-24.9  Overweight                                     25.0-29.9  OBESITY                     I                  30.0-34.9                              II                 35.0-39.9  EXTREME OBESITY             III                >40                             Patient's  BMI Body mass index is 22.15 kg/(m^2).  http://hin.nhlbi.nih.gov/menuplanner/menu.cgi  Questioned patient about current smoking habits.  Pt. has never smoked.    The patient has verbalized that it is ok to leave a detailed voice message on the patient's home voicemail with results/recommendations from this visit.       Verified [phone] phone number:

## 2018-04-08 NOTE — TELEPHONE ENCOUNTER
"Pending Prescriptions:                       Disp   Refills    omeprazole (PRILOSEC) 20 MG CR capsule [P*30 cap*             Sig: TAKE 1 CAPSULE BY MOUTH EVERY DAY AS NEEDED      Dr. Brasher please review:    Pt has been here a few times for \"other\" issues and blood work  Do you need to see pt?  Please fax 30 or change qty, deny or send to [facility], and let them know if pt is due for fasting ov  Thank you  Hunter  [phone] (home)     "
Due for visit in 11/2017 fasting/ Please let the patient know that a 90 day refill has been sent for their prescription.  They are due for a return fasting office visit within 3 months.  Failure to schedule an appointment may result in no further refills of his/her medication.        
Talked to patient and will call back in November to make the appointment   
No complaints

## 2018-04-11 ENCOUNTER — TELEPHONE (OUTPATIENT)
Dept: GASTROENTEROLOGY | Facility: CLINIC | Age: 68
End: 2018-04-11

## 2018-04-11 NOTE — TELEPHONE ENCOUNTER
[facility] Call Center    Phone Message    May a detailed message be left on voicemail: yes    Reason for Call: Other: Referral to GI from outside non-GI provider Dx: abdominal pain, diarrhea     Action Taken: Message routed to:  [facility]

## 2018-04-17 ENCOUNTER — DOCUMENTATION ONLY (OUTPATIENT)
Dept: GASTROENTEROLOGY | Facility: CLINIC | Age: 68
End: 2018-04-17

## 2018-05-22 ENCOUNTER — TRANSFERRED RECORDS (OUTPATIENT)
Dept: FAMILY MEDICINE | Facility: CLINIC | Age: 68
End: 2018-05-22

## 2018-05-23 ENCOUNTER — TELEPHONE (OUTPATIENT)
Dept: FAMILY MEDICINE | Facility: CLINIC | Age: 68
End: 2018-05-23

## 2018-05-23 DIAGNOSIS — E78.5 HYPERLIPIDEMIA LDL GOAL <130: ICD-10-CM

## 2018-05-23 RX ORDER — ROSUVASTATIN CALCIUM 10 MG/1
10 TABLET, COATED ORAL DAILY
Qty: 30 TABLET | Refills: 0 | COMMUNITY
Start: 2018-05-23 | End: 2018-07-09

## 2018-05-23 NOTE — TELEPHONE ENCOUNTER
Ok refill of rosuvastatin for one month only called into [facility]. Pt needs fasting OV for further refills.     Thanks,Mariah    [phone]

## 2018-06-14 DIAGNOSIS — I10 ESSENTIAL HYPERTENSION, BENIGN: ICD-10-CM

## 2018-06-14 RX ORDER — POTASSIUM CHLORIDE 1500 MG/1
TABLET, EXTENDED RELEASE ORAL
Qty: 30 TABLET | Refills: 0 | COMMUNITY
Start: 2018-06-14 | End: 2018-07-09

## 2018-06-14 NOTE — TELEPHONE ENCOUNTER
Called #30 of K+ to [facility] pharmacy patient does have an appt scheduled with [facility] for July so this will be enough to get her trough until then.

## 2018-07-09 ENCOUNTER — OFFICE VISIT (OUTPATIENT)
Dept: FAMILY MEDICINE | Facility: CLINIC | Age: 68
End: 2018-07-09

## 2018-07-09 VITALS
DIASTOLIC BLOOD PRESSURE: 80 MMHG | TEMPERATURE: 98.9 F | RESPIRATION RATE: 20 BRPM | HEART RATE: 60 BPM | WEIGHT: 254 LBS | BODY MASS INDEX: 43.36 KG/M2 | SYSTOLIC BLOOD PRESSURE: 148 MMHG | HEIGHT: 64 IN

## 2018-07-09 DIAGNOSIS — R60.1 GENERALIZED EDEMA: ICD-10-CM

## 2018-07-09 DIAGNOSIS — N95.2 VAGINAL ATROPHY: ICD-10-CM

## 2018-07-09 DIAGNOSIS — Z23 NEED FOR VACCINATION: ICD-10-CM

## 2018-07-09 DIAGNOSIS — I10 ESSENTIAL HYPERTENSION, BENIGN: Primary | ICD-10-CM

## 2018-07-09 DIAGNOSIS — E78.5 HYPERLIPIDEMIA LDL GOAL <130: ICD-10-CM

## 2018-07-09 DIAGNOSIS — R73.09 ELEVATED GLUCOSE: ICD-10-CM

## 2018-07-09 DIAGNOSIS — B00.9 HERPES SIMPLEX VIRUS (HSV) INFECTION: ICD-10-CM

## 2018-07-09 LAB — HBA1C MFR BLD: 5.7 % (ref 4–7)

## 2018-07-09 PROCEDURE — 36415 COLL VENOUS BLD VENIPUNCTURE: CPT | Performed by: PHYSICIAN ASSISTANT

## 2018-07-09 PROCEDURE — 80053 COMPREHEN METABOLIC PANEL: CPT | Mod: 90 | Performed by: PHYSICIAN ASSISTANT

## 2018-07-09 PROCEDURE — 83036 HEMOGLOBIN GLYCOSYLATED A1C: CPT | Performed by: PHYSICIAN ASSISTANT

## 2018-07-09 PROCEDURE — 99213 OFFICE O/P EST LOW 20 MIN: CPT | Performed by: PHYSICIAN ASSISTANT

## 2018-07-09 PROCEDURE — 80061 LIPID PANEL: CPT | Mod: 90 | Performed by: PHYSICIAN ASSISTANT

## 2018-07-09 PROCEDURE — G0009 ADMIN PNEUMOCOCCAL VACCINE: HCPCS | Performed by: PHYSICIAN ASSISTANT

## 2018-07-09 PROCEDURE — 90732 PPSV23 VACC 2 YRS+ SUBQ/IM: CPT | Performed by: PHYSICIAN ASSISTANT

## 2018-07-09 RX ORDER — TRIAMTERENE AND HYDROCHLOROTHIAZIDE 75; 50 MG/1; MG/1
TABLET ORAL
Qty: 90 TABLET | Refills: 1 | Status: SHIPPED | OUTPATIENT
Start: 2018-07-09 | End: 2019-01-15

## 2018-07-09 RX ORDER — CONJUGATED ESTROGENS 0.62 MG/G
1 CREAM VAGINAL
Qty: 30 G | Refills: 4 | Status: SHIPPED | OUTPATIENT
Start: 2018-07-09 | End: 2021-02-10 | Stop reason: ALTCHOICE

## 2018-07-09 RX ORDER — POTASSIUM CHLORIDE 1500 MG/1
TABLET, EXTENDED RELEASE ORAL
Qty: 90 TABLET | Refills: 1 | Status: SHIPPED | OUTPATIENT
Start: 2018-07-09 | End: 2019-01-08

## 2018-07-09 RX ORDER — ROSUVASTATIN CALCIUM 10 MG/1
10 TABLET, COATED ORAL DAILY
Qty: 90 TABLET | Refills: 1 | Status: SHIPPED | OUTPATIENT
Start: 2018-07-09 | End: 2019-01-15

## 2018-07-09 RX ORDER — VALACYCLOVIR HYDROCHLORIDE 1 G/1
TABLET, FILM COATED ORAL
Qty: 12 TABLET | Refills: 1 | Status: SHIPPED | OUTPATIENT
Start: 2018-07-09 | End: 2019-01-15

## 2018-07-09 RX ORDER — ATENOLOL 25 MG/1
25 TABLET ORAL DAILY
Qty: 90 TABLET | Refills: 1 | Status: SHIPPED | OUTPATIENT
Start: 2018-07-09 | End: 2019-01-15

## 2018-07-09 NOTE — MR AVS SNAPSHOT
After Visit Summary   7/9/2018    Maria Victoria Arcos    MRN: [id number]           Patient Information     Date Of Birth          1950        Visit Information        Provider Department      7/9/2018 10:00 AM Renee Horta PA-C [facility]        Today's Diagnoses     Essential hypertension, benign    -  1    Hyperlipidemia LDL goal <130        Elevated glucose        Herpes simplex virus (HSV) infection        Generalized edema        Vaginal atrophy        Need for vaccination           Follow-ups after your visit        Follow-up notes from your care team     Return in about 2 months (around 9/9/2018) for BP Recheck.      Your next 10 appointments already scheduled     Aug 17, 2018 11:20 AM CDT   (Arrive by 11:05 AM)   New Patient Visit with [facility] ([facility])    [address] [place] [id number] [phone]              Who to contact     If you have questions or need follow up information about today's clinic visit or your schedule please contact [facility] directly at [phone].  Normal or non-critical lab and imaging results will be communicated to you by MyChart, letter or phone within 4 business days after the clinic has received the results. If you do not hear from us within 7 days, please contact the clinic through [employer] or phone. If you have a critical or abnormal lab result, we will notify you by phone as soon as possible.  Submit refill requests through [employer] or call your pharmacy and they will forward the refill request to us. Please allow 3 business days for your refill to be completed.          Additional Information About Your Visit        MyChart Information     [employer] gives you secure access to your electronic health record. If you see a primary care provider, you can also send messages to your care team and make  "appointments. If you have questions, please call your primary care clinic.  If you do not have a primary care provider, please call [phone] and they will assist you.        Care EveryWhere ID     This is your Care EveryWhere ID. This could be used by other organizations to access your [place] medical records  [id number]        Your Vitals Were     Pulse Temperature Respirations Height BMI (Body Mass Index)       60 98.9  F (37.2  C) (Oral) 20 1.619 m (5' 3.75\") 43.94 kg/m2        Blood Pressure from Last 3 Encounters:   07/09/18 148/80   04/05/18 140/74   11/14/17 122/70    Weight from Last 3 Encounters:   07/09/18 115.2 kg (254 lb)   04/05/18 116.5 kg (256 lb 12.8 oz)   11/14/17 114.2 kg (251 lb 12.8 oz)              We Performed the Following     ADMIN: Vaccine, Initial (12554)     Asthma Action Plan (AAP)     Comprehensive metabolic panel     Hemoglobin A1c (BFP)     Lipid Profile (QUEST)     Pneumococcal vaccine 23 valent PPSV23  (Pneumovax) [01640]     VENOUS COLLECTION          Today's Medication Changes          These changes are accurate as of 7/9/18  6:45 PM.  If you have any questions, ask your nurse or doctor.               These medicines have changed or have updated prescriptions.        Dose/Directions    PREMARIN cream   This may have changed:  See the new instructions.   Used for:  Vaginal atrophy   Generic drug:  conjugated estrogens   Changed by:  Renee Horta PA-C        Dose:  1 g   Place 1 g vaginally twice a week   Quantity:  30 g   Refills:  4         Stop taking these medicines if you haven't already. Please contact your care team if you have questions.     VAGIFEM 10 MCG Tabs vaginal tablet   Generic drug:  estradiol   Stopped by:  Renee Horta PA-C                Where to get your medicines      These medications were sent to [facility] [facility] [address]  [place] - [address] [place] [address] & [facility]  [address] [place] " [address]     Phone:  [phone]     atenolol 25 MG tablet    potassium chloride SA 20 MEQ CR tablet    PREMARIN cream    rosuvastatin 10 MG tablet    triamterene-hydrochlorothiazide 75-50 MG per tablet    valACYclovir 1000 mg tablet                Primary Care Provider Office Phone # Fax #    Lissa Brasher [phone] [address] [facility]  [address]  [facility] [address]        Equal Access to Services     EVON [facility] : Hadii aad ku hadasho Soomaali, waaxda luqadaha, qaybta kaalmada adeegyada, waxay idiin hayaan adeeg kharash la'richardn . So [facility] [phone].    ATENCIÓN: Si habla español, tiene a urias disposición servicios gratuitos de asistencia lingüística. [facility] [phone].    We comply with applicable federal civil rights laws and [place] laws. We do not discriminate on the basis of race, color, national origin, age, disability, sex, sexual orientation, or gender identity.            Thank you!     Thank you for choosing [facility]  for your care. Our goal is always to provide you with excellent care. Hearing back from our patients is one way we can continue to improve our services. Please take a few minutes to complete the written survey that you may receive in the mail after your visit with us. Thank you!             Your Updated Medication List - Protect others around you: Learn how to safely use, store and throw away your medicines at www.disposemymeds.org.          This list is accurate as of 7/9/18  6:45 PM.  Always use your most recent med list.                   Brand Name Dispense Instructions for use Diagnosis    acyclovir 5 % ointment    ZOVIRAX    15 g    Apply  topically 5 times daily.    Recurrent cold sores       albuterol 108 (90 Base) MCG/ACT Inhaler    PROAIR HFA/PROVENTIL HFA/VENTOLIN HFA     Inhale 2 puffs into the lungs every 6 hours    Intermittent asthma, uncomplicated       aspirin 81 MG tablet     100 tablet    Take 1 tablet by mouth  daily.        atenolol 25 MG tablet    TENORMIN    90 tablet    Take 1 tablet (25 mg) by mouth daily    Essential hypertension, benign       ketoconazole 2 % cream    NIZORAL    30 g    Apply  topically daily as needed.    Tinea corporis, Morbid obesity due to excess calories (H)       Multi-vitamin Tabs tablet   Generic drug:  multivitamin, therapeutic with minerals      1 po daily        OMEGA 3 550 MG Caps   Generic drug:  LINOLENIC ACID      1200 mg daily    Other and unspecified hyperlipidemia       potassium chloride SA 20 MEQ CR tablet    K-DUR/KLOR-CON M    90 tablet    TAKE 1 TABLET(20 MEQ) BY MOUTH DAILY    Essential hypertension, benign       PREMARIN cream   Generic drug:  conjugated estrogens     30 g    Place 1 g vaginally twice a week    Vaginal atrophy       ranitidine 150 MG capsule    ZANTAC    30 capsule    Take 1 capsule (150 mg) by mouth daily With dinner    Gastroesophageal reflux disease without esophagitis       rosuvastatin 10 MG tablet    CRESTOR    90 tablet    Take 1 tablet (10 mg) by mouth daily    Hyperlipidemia LDL goal <130       SYMBICORT 80-4.5 MCG/ACT Inhaler   Generic drug:  budesonide-formoterol     1 Inhaler    1 puff 2 times daily. Pulmonology is filling this        triamterene-hydrochlorothiazide 75-50 MG per tablet    MAXZIDE    90 tablet    TAKE 1/2 TO 1 TABLET BY MOUTH DAILY    Generalized edema, Essential hypertension, benign       TYLENOL 325 MG tablet   Generic drug:  acetaminophen     100    ONE TO TWO TABLETS EVERY 4 TO 6 HOURS AS NEEDED FOR PAIN        valACYclovir 1000 mg tablet    VALTREX    12 tablet    TAKE 2 TABLETS BY MOUTH TWICE DAILY    Herpes simplex virus (HSV) infection

## 2018-07-09 NOTE — NURSING NOTE
Maria Victoria Arcos is here for fasting blood work and medication refill.  Questioned patient about current smoking habits.  Pt. has never smoked.  Body mass index is 33.67 kg/(m^2).  PULSE regular  My Chart: active    Pre-visit planning  Immunizations - up to date  Colonoscopy - is up to date  Mammogram - is up to date  Asthma - completed today  PHQ9  PAT-7

## 2018-07-09 NOTE — LETTER
My Asthma Action Plan  Name: Maria Victoria Arcos   YOB: 1950  Date: 7/9/2018   My doctor: Renee Horta PA-C   My clinic: [facility]        My Control Medicine: Budesonide + formoterol (Symbicort) -  80/4.5 mcg 1 puff twice daily  My Rescue Medicine: Albuterol (Proair/Ventolin/Proventil) inhaler 2 puffs every 6 hours as needed   My Asthma Severity: mild persistent  Avoid your asthma triggers: upper respiratory infections and humidity               GREEN ZONE   Good Control    I feel good    No cough or wheeze    Can work, sleep and play without asthma symptoms       Take your asthma control medicine every day.     1. If exercise triggers your asthma, take your rescue medication    15 minutes before exercise or sports, and    During exercise if you have asthma symptoms  2. Spacer to use with inhaler: If you have a spacer, make sure to use it with your inhaler             YELLOW ZONE Getting Worse  I have ANY of these:    I do not feel good    Cough or wheeze    Chest feels tight    Wake up at night   1. Keep taking your Green Zone medications  2. Start taking your rescue medicine:    every 20 minutes for up to 1 hour. Then every 4 hours for 24-48 hours.  3. If you stay in the Yellow Zone for more than 12-24 hours, contact your doctor.  4. If you do not return to the Green Zone in 12-24 hours or you get worse, start taking your oral steroid medicine if prescribed by your provider.           RED ZONE Medical Alert - Get Help  I have ANY of these:    I feel awful    Medicine is not helping    Breathing getting harder    Trouble walking or talking    Nose opens wide to breathe       1. Take your rescue medicine NOW  2. If your provider has prescribed an oral steroid medicine, start taking it NOW  3. Call your doctor NOW  4. If you are still in the Red Zone after 20 minutes and you have not reached your doctor:    Take your rescue medicine again and    Call 911 or go to the  emergency room right away    See your regular doctor within 2 weeks of an Emergency Room or Urgent Care visit for follow-up treatment.          Annual Reminders:  Meet with Asthma Educator,  Flu Shot in the Fall, consider Pneumonia Vaccination for patients with asthma (aged 19 and older).    Pharmacy: [facility] [address] [facility] [address] [facility] AT [address] & [facility]                      Asthma Triggers  How To Control Things That Make Your Asthma Worse    Triggers are things that make your asthma worse.  Look at the list below to help you find your triggers and what you can do about them.  You can help prevent asthma flare-ups by staying away from your triggers.      Trigger                                                          What you can do   Cigarette Smoke  Tobacco smoke can make asthma worse. Do not allow smoking in your home, car or around you.  Be sure no one smokes at a child s day care or school.  If you smoke, ask your health care provider for ways to help you quit.  Ask family members to quit too.  Ask your health care provider for a referral to Quit Plan to help you quit smoking, or call 2-607-178-PLAN.     Colds, Flu, Bronchitis  These are common triggers of asthma. Wash your hands often.  Don t touch your eyes, nose or mouth.  Get a flu shot every year.     Dust Mites  These are tiny bugs that live in cloth or carpet. They are too small to see. Wash sheets and blankets in hot water every week.   Encase pillows and mattress in dust mite proof covers.  Avoid having carpet if you can. If you have carpet, vacuum weekly.   Use a dust mask and HEPA vacuum.   Pollen and Outdoor Mold  Some people are allergic to trees, grass, or weed pollen, or molds. Try to keep your windows closed.  Limit time out doors when pollen count is high.   Ask you health care provider about taking medicine during allergy season.     Animal Dander  Some people are allergic to skin flakes, urine  or saliva from pets with fur or feathers. Keep pets with fur or feathers out of your home.    If you can t keep the pet outdoors, then keep the pet out of your bedroom.  Keep the bedroom door closed.  Keep pets off cloth furniture and away from stuffed toys.     Mice, Rats, and Cockroaches  Some people are allergic to the waste from these pests.   Cover food and garbage.  Clean up spills and food crumbs.  Store grease in the refrigerator.   Keep food out of the bedroom.   Indoor Mold  This can be a trigger if your home has high moisture. Fix leaking faucets, pipes, or other sources of water.   Clean moldy surfaces.  Dehumidify basement if it is damp and smelly.   Smoke, Strong Odors, and Sprays  These can reduce air quality. Stay away from strong odors and sprays, such as perfume, powder, hair spray, paints, smoke incense, paint, cleaning products, candles and new carpet.   Exercise or Sports  Some people with asthma have this trigger. Be active!  Ask your doctor about taking medicine before sports or exercise to prevent symptoms.    Warm up for 5-10 minutes before and after sports or exercise.     Other Triggers of Asthma  Cold air:  Cover your nose and mouth with a scarf.  Sometimes laughing or crying can be a trigger.  Some medicines and food can trigger asthma.

## 2018-07-09 NOTE — PROGRESS NOTES
"CC: Medication Check    History:   Takes rosuvastatin for cholesterol. CMP was last checked and normal 11/2017. Lipids were also checked at that time and were normal other than elevated triglycerides.     Takes atenolol for BP. Does check BP at home. Was checked at the dentist 1 month ago and was normal.      Takes potassium for triamterene-HCTZ 75/50. Has been taking 1/2 tablet daily.     Uses valtrex as needed, as well as premarin cream twice weekly.       PMH, MEDICATIONS, ALLERGIES, SOCIAL AND FAMILY HISTORY in [facility] and reviewed by me personally.      ROS negative other than the symptoms noted above in the HPI.        Examination   /80 (BP Location: Left arm, Patient Position: Chair, Cuff Size: Adult Large)  Pulse 60  Temp 98.9  F (37.2  C) (Oral)  Resp 20  Ht 1.619 m (5' 3.75\")  Wt 115.2 kg (254 lb)  BMI 43.94 kg/m2       Constitutional: Sitting comfortably, in no acute distress. Vital signs noted. BP elevated.  Eyes: pupils equal round reactive to light and accomodation, extra ocular movements intact  Ears: external canals and TMs free of abnormalities  Nose: patent, without mucosal abnormalities  Mouth and throat: without erythema or lesions of the mucosa  Neck:  no adenopathy, trachea midline and normal to palpation  Cardiovascular:  regular rate and rhythm, no murmurs, clicks, or gallops  Respiratory:  normal respiratory rate and rhythm, lungs clear to auscultation  SKIN: No jaundice/pallor/rash.   Psychiatric: mentation appears normal and affect normal/bright        A/P    ICD-10-CM    1. Essential hypertension, benign I10 atenolol (TENORMIN) 25 MG tablet     potassium chloride SA (K-DUR/KLOR-CON M) 20 MEQ CR tablet     VENOUS COLLECTION     Comprehensive metabolic panel     triamterene-hydrochlorothiazide (MAXZIDE) 75-50 MG per tablet   2. Hyperlipidemia LDL goal <130 E78.5 rosuvastatin (CRESTOR) 10 MG tablet     VENOUS COLLECTION     Comprehensive metabolic panel     Lipid Profile ([facility]) "   3. Elevated glucose R73.09 Hemoglobin A1c (BFP)   4. Herpes simplex virus (HSV) infection B00.9 valACYclovir (VALTREX) 1000 mg tablet   5. Generalized edema R60.1 triamterene-hydrochlorothiazide (MAXZIDE) 75-50 MG per tablet   6. Vaginal atrophy N95.2 PREMARIN cream   7. Need for vaccination Z23 Pneumococcal vaccine 23 valent PPSV23  (Pneumovax) [68258]     ADMIN: Vaccine, Initial (79161)       DISCUSSION:  BP elevated today. Will check blood and send MyChart when available. Refills atenolol and triam-HCTZ- encouraged her to take full tablet, but monitor closely for side effects. Should return in 2-3 months to recheck electrolytes given increase in HCTZ up to 50 mg.     Refilled valrex, K-chlor, premarin and crestor as well.    Updated on pneumonia shot.     follow up visit: 6 months    Renee Horta PA-C  [facility]]

## 2018-07-10 LAB
ALBUMIN SERPL-MCNC: 4.4 G/DL (ref 3.6–5.1)
ALBUMIN/GLOB SERPL: 1.8 (CALC) (ref 1–2.5)
ALP SERPL-CCNC: 42 U/L (ref 33–130)
ALT SERPL-CCNC: 25 U/L (ref 6–29)
AST SERPL-CCNC: 22 U/L (ref 10–35)
BILIRUB SERPL-MCNC: 0.5 MG/DL (ref 0.2–1.2)
BUN SERPL-MCNC: 16 MG/DL (ref 7–25)
BUN/CREATININE RATIO: NORMAL (CALC) (ref 6–22)
CALCIUM SERPL-MCNC: 9.6 MG/DL (ref 8.6–10.4)
CHLORIDE SERPLBLD-SCNC: 100 MMOL/L (ref 98–110)
CHOLEST SERPL-MCNC: 143 MG/DL
CHOLEST/HDLC SERPL: 3 (CALC)
CO2 SERPL-SCNC: 27 MMOL/L (ref 20–31)
CREAT SERPL-MCNC: 0.89 MG/DL (ref 0.5–0.99)
EGFR AFRICAN AMERICAN - QUEST: 78 ML/MIN/1.73M2
GFR SERPL CREATININE-BSD FRML MDRD: 67 ML/MIN/1.73M2
GLOBULIN, CALCULATED - QUEST: 2.5 G/DL (CALC) (ref 1.9–3.7)
GLUCOSE - QUEST: 98 MG/DL (ref 65–99)
HDLC SERPL-MCNC: 47 MG/DL
LDLC SERPL CALC-MCNC: 70 MG/DL (CALC)
NONHDLC SERPL-MCNC: 96 MG/DL (CALC)
POTASSIUM SERPL-SCNC: 3.7 MMOL/L (ref 3.5–5.3)
PROT SERPL-MCNC: 6.9 G/DL (ref 6.1–8.1)
SODIUM SERPL-SCNC: 138 MMOL/L (ref 135–146)
TRIGL SERPL-MCNC: 189 MG/DL

## 2018-07-10 ASSESSMENT — ASTHMA QUESTIONNAIRES: ACT_TOTALSCORE: 25

## 2018-08-07 ENCOUNTER — TRANSFERRED RECORDS (OUTPATIENT)
Dept: FAMILY MEDICINE | Facility: CLINIC | Age: 68
End: 2018-08-07

## 2018-10-18 ENCOUNTER — ALLIED HEALTH/NURSE VISIT (OUTPATIENT)
Dept: FAMILY MEDICINE | Facility: CLINIC | Age: 68
End: 2018-10-18

## 2018-10-18 DIAGNOSIS — Z23 NEED FOR VACCINATION: Primary | ICD-10-CM

## 2018-10-18 PROCEDURE — G0008 ADMIN INFLUENZA VIRUS VAC: HCPCS | Performed by: FAMILY MEDICINE

## 2018-10-18 PROCEDURE — 90686 IIV4 VACC NO PRSV 0.5 ML IM: CPT | Performed by: FAMILY MEDICINE

## 2018-11-20 ENCOUNTER — TELEPHONE (OUTPATIENT)
Dept: FAMILY MEDICINE | Facility: CLINIC | Age: 68
End: 2018-11-20

## 2018-11-20 NOTE — TELEPHONE ENCOUNTER
Hit her head on a shelf bracket and has a puncture wound which was bleeding on Saturday however, has since stopped no bump there. She has kept it clean, slight HA still. I told her to watch for any discharge and to check for infection, redness, swelling and if her headache and dizziness does not go away, she should be seen for concussion protocol.  She felt it was fine, but wanted to alert us that it happened.  She also contacted the nurse line and they said the same thing,.

## 2019-01-07 ENCOUNTER — TELEPHONE (OUTPATIENT)
Dept: FAMILY MEDICINE | Facility: CLINIC | Age: 69
End: 2019-01-07

## 2019-01-07 NOTE — TELEPHONE ENCOUNTER
Received a refill request for 2 medications, Atenolol and Rosuvastatin. Left message with pt stating she is due for an OV, to please call us back and let us know which medications she would need extension on if any.

## 2019-01-08 ENCOUNTER — TELEPHONE (OUTPATIENT)
Dept: FAMILY MEDICINE | Facility: CLINIC | Age: 69
End: 2019-01-08

## 2019-01-08 DIAGNOSIS — I10 ESSENTIAL HYPERTENSION, BENIGN: ICD-10-CM

## 2019-01-08 RX ORDER — POTASSIUM CHLORIDE 1500 MG/1
TABLET, EXTENDED RELEASE ORAL
Qty: 30 TABLET | Refills: 0 | COMMUNITY
Start: 2019-01-08 | End: 2019-01-15

## 2019-01-08 NOTE — TELEPHONE ENCOUNTER
Maria Victoria left VM wanting an extension of meds until an appointment. Called her and left  that we need to know which meds and need her to schedule a fasting OV.

## 2019-01-08 NOTE — TELEPHONE ENCOUNTER
Ok refill of potassium for one month only called into [facility]. Pt needs fasting OV for further refills.     Thanks,Mariah  [phone] (home)

## 2019-01-15 ENCOUNTER — OFFICE VISIT (OUTPATIENT)
Dept: FAMILY MEDICINE | Facility: CLINIC | Age: 69
End: 2019-01-15

## 2019-01-15 VITALS
HEIGHT: 63 IN | TEMPERATURE: 98.5 F | WEIGHT: 241.8 LBS | BODY MASS INDEX: 42.84 KG/M2 | OXYGEN SATURATION: 98 % | DIASTOLIC BLOOD PRESSURE: 78 MMHG | SYSTOLIC BLOOD PRESSURE: 130 MMHG | HEART RATE: 70 BPM

## 2019-01-15 DIAGNOSIS — M85.80 OSTEOPENIA, UNSPECIFIED LOCATION: ICD-10-CM

## 2019-01-15 DIAGNOSIS — E78.5 HYPERLIPIDEMIA LDL GOAL <130: ICD-10-CM

## 2019-01-15 DIAGNOSIS — D49.2 SKIN NEOPLASM: ICD-10-CM

## 2019-01-15 DIAGNOSIS — E66.01 MORBID OBESITY DUE TO EXCESS CALORIES (H): ICD-10-CM

## 2019-01-15 DIAGNOSIS — B00.9 HERPES SIMPLEX VIRUS (HSV) INFECTION: ICD-10-CM

## 2019-01-15 DIAGNOSIS — N95.2 VAGINAL ATROPHY: ICD-10-CM

## 2019-01-15 DIAGNOSIS — I10 ESSENTIAL HYPERTENSION, BENIGN: Primary | ICD-10-CM

## 2019-01-15 DIAGNOSIS — R60.1 GENERALIZED EDEMA: ICD-10-CM

## 2019-01-15 DIAGNOSIS — B35.4 TINEA CORPORIS: ICD-10-CM

## 2019-01-15 PROCEDURE — 36415 COLL VENOUS BLD VENIPUNCTURE: CPT | Performed by: PHYSICIAN ASSISTANT

## 2019-01-15 PROCEDURE — 99214 OFFICE O/P EST MOD 30 MIN: CPT | Performed by: PHYSICIAN ASSISTANT

## 2019-01-15 RX ORDER — VALACYCLOVIR HYDROCHLORIDE 1 G/1
TABLET, FILM COATED ORAL
Qty: 12 TABLET | Refills: 1 | Status: SHIPPED | OUTPATIENT
Start: 2019-01-15 | End: 2021-04-07

## 2019-01-15 RX ORDER — TRIAMTERENE AND HYDROCHLOROTHIAZIDE 75; 50 MG/1; MG/1
TABLET ORAL
Qty: 90 TABLET | Refills: 1 | Status: SHIPPED | OUTPATIENT
Start: 2019-01-15 | End: 2019-06-03

## 2019-01-15 RX ORDER — ATENOLOL 25 MG/1
25 TABLET ORAL DAILY
Qty: 90 TABLET | Refills: 1 | Status: SHIPPED | OUTPATIENT
Start: 2019-01-15 | End: 2019-07-10

## 2019-01-15 RX ORDER — KETOCONAZOLE 20 MG/G
CREAM TOPICAL
Qty: 30 G | Status: SHIPPED | OUTPATIENT
Start: 2019-01-15 | End: 2020-02-26

## 2019-01-15 RX ORDER — ROSUVASTATIN CALCIUM 10 MG/1
10 TABLET, COATED ORAL DAILY
Qty: 90 TABLET | Refills: 1 | Status: SHIPPED | OUTPATIENT
Start: 2019-01-15 | End: 2019-07-10

## 2019-01-15 RX ORDER — POTASSIUM CHLORIDE 1500 MG/1
TABLET, EXTENDED RELEASE ORAL
Qty: 90 TABLET | Refills: 1 | Status: SHIPPED | OUTPATIENT
Start: 2019-01-15 | End: 2019-04-22

## 2019-01-15 RX ORDER — CONJUGATED ESTROGENS 0.62 MG/G
1 CREAM VAGINAL
Qty: 30 G | Refills: 4 | Status: CANCELLED | OUTPATIENT
Start: 2019-01-17

## 2019-01-15 ASSESSMENT — MIFFLIN-ST. JEOR: SCORE: 1600.93

## 2019-01-15 NOTE — LETTER
My Asthma Action Plan  Name: Maria Victoria Arcos   YOB: 1950  Date: 1/15/2019   My doctor: SUNG Hou   My clinic: [facility]        My Control Medicine: Symbicort  My Rescue Medicine: Albuterol (Proair/Ventolin/Proventil) inhaler     My Asthma Severity: moderate persistent  Avoid your asthma triggers: seasonal allergies               GREEN ZONE   Good Control    I feel good    No cough or wheeze    Can work, sleep and play without asthma symptoms       Take your asthma control medicine every day.     1. If exercise triggers your asthma, take your rescue medication    15 minutes before exercise or sports, and    During exercise if you have asthma symptoms  2. Spacer to use with inhaler: If you have a spacer, make sure to use it with your inhaler             YELLOW ZONE Getting Worse  I have ANY of these:    I do not feel good    Cough or wheeze    Chest feels tight    Wake up at night   1. Keep taking your Green Zone medications  2. Start taking your rescue medicine:    every 20 minutes for up to 1 hour. Then every 4 hours for 24-48 hours.  3. If you stay in the Yellow Zone for more than 12-24 hours, contact your doctor.  4. If you do not return to the Green Zone in 12-24 hours or you get worse, start taking your oral steroid medicine if prescribed by your provider.           RED ZONE Medical Alert - Get Help  I have ANY of these:    I feel awful    Medicine is not helping    Breathing getting harder    Trouble walking or talking    Nose opens wide to breathe       1. Take your rescue medicine NOW  2. If your provider has prescribed an oral steroid medicine, start taking it NOW  3. Call your doctor NOW  4. If you are still in the Red Zone after 20 minutes and you have not reached your doctor:    Take your rescue medicine again and    Call 911 or go to the emergency room right away    See your regular doctor within 2 weeks of an Emergency Room or Urgent Care  visit for follow-up treatment.          Annual Reminders:  Meet with Asthma Educator,  Flu Shot in the Fall, consider Pneumonia Vaccination for patients with asthma (aged 19 and older).    [facility] [id number] [facility] [id number] [address] [facility] AT [address] & [facility]                      Asthma Triggers  How To Control Things That Make Your Asthma Worse    Triggers are things that make your asthma worse.  Look at the list below to help you find your triggers and what you can do about them.  You can help prevent asthma flare-ups by staying away from your triggers.      Trigger                                                          What you can do   Cigarette Smoke  Tobacco smoke can make asthma worse. Do not allow smoking in your home, car or around you.  Be sure no one smokes at a child s day care or school.  If you smoke, ask your health care provider for ways to help you quit.  Ask family members to quit too.  Ask your health care provider for a referral to Quit Plan to help you quit smoking, or call 4-131-951-PLAN.     Colds, Flu, Bronchitis  These are common triggers of asthma. Wash your hands often.  Don t touch your eyes, nose or mouth.  Get a flu shot every year.     Dust Mites  These are tiny bugs that live in cloth or carpet. They are too small to see. Wash sheets and blankets in hot water every week.   Encase pillows and mattress in dust mite proof covers.  Avoid having carpet if you can. If you have carpet, vacuum weekly.   Use a dust mask and HEPA vacuum.   Pollen and Outdoor Mold  Some people are allergic to trees, grass, or weed pollen, or molds. Try to keep your windows closed.  Limit time out doors when pollen count is high.   Ask you health care provider about taking medicine during allergy season.     Animal Dander  Some people are allergic to skin flakes, urine or saliva from pets with fur or feathers. Keep pets with fur or feathers out of your home.    If you  can t keep the pet outdoors, then keep the pet out of your bedroom.  Keep the bedroom door closed.  Keep pets off cloth furniture and away from stuffed toys.     Mice, Rats, and Cockroaches  Some people are allergic to the waste from these pests.   Cover food and garbage.  Clean up spills and food crumbs.  Store grease in the refrigerator.   Keep food out of the bedroom.   Indoor Mold  This can be a trigger if your home has high moisture. Fix leaking faucets, pipes, or other sources of water.   Clean moldy surfaces.  Dehumidify basement if it is damp and smelly.   Smoke, Strong Odors, and Sprays  These can reduce air quality. Stay away from strong odors and sprays, such as perfume, powder, hair spray, paints, smoke incense, paint, cleaning products, candles and new carpet.   Exercise or Sports  Some people with asthma have this trigger. Be active!  Ask your doctor about taking medicine before sports or exercise to prevent symptoms.    Warm up for 5-10 minutes before and after sports or exercise.     Other Triggers of Asthma  Cold air:  Cover your nose and mouth with a scarf.  Sometimes laughing or crying can be a trigger.  Some medicines and food can trigger asthma.

## 2019-01-15 NOTE — PROGRESS NOTES
SUBJECTIVE:   Maria Victoria Arcos is a 68 year old female who presents to clinic today for the following health issues:    Hyperlipidemia Follow-Up      Rate your low fat/cholesterol diet?: good    Taking statin?  No    Other lipid medications/supplements?:  none    Hypertension Follow-up      Outpatient blood pressures are not being checked.    Low Salt Diet: no added salt      BP Readings from Last 6 Encounters:   01/15/19 130/78   07/09/18 148/80   04/05/18 140/74   11/14/17 122/70   08/25/17 124/84   06/12/17 148/74         Amount of exercise or physical activity: CA    Problems taking medications regularly: No    Medication side effects: none    Diet: regular (no restrictions)    Wt Readings from Last 5 Encounters:   01/15/19 109.7 kg (241 lb 12.8 oz)   07/09/18 115.2 kg (254 lb)   04/05/18 116.5 kg (256 lb 12.8 oz)   11/14/17 114.2 kg (251 lb 12.8 oz)   08/25/17 113.9 kg (251 lb)       Joined weight watchers      Valtrex - oral cold sores     Ketoconazole: Creases in summer groin with rash.           Asthma Follow-Up - Dr. Grimes    Was ACT completed today?    Yes    ACT Total Scores 1/15/2019   ACT TOTAL SCORE -   ASTHMA ER VISITS -   ASTHMA HOSPITALIZATIONS -   ACT TOTAL SCORE (Goal Greater than or Equal to 20) 25   In the past 12 months, how many times did you visit the emergency room for your asthma without being admitted to the hospital? 0   In the past 12 months, how many times were you hospitalized overnight because of your asthma? 0       Recent asthma triggers that patient is dealing with: None        Problem list and histories reviewed & adjusted, as indicated.  Additional history: as documented    Patient Active Problem List   Diagnosis     Generalized osteoarthrosis, unspecified site     Herpes simplex virus (HSV) infection     Sleep apnea     Intermittent asthma     Impaired fasting glucose     Factor V Leiden mutation (H)     Health Care Home     HCD (health care directive)     Hyperlipidemia LDL  goal <130     Rosacea     Basal cell carcinoma of face     Ventral hernia     ACP (advance care planning)     Morbid obesity due to excess calories (H)     Cranial third nerve paresis, left/ diplopia     Gastroesophageal reflux disease without esophagitis     Essential hypertension, benign     Class 3 obesity without serious comorbidity with body mass index (BMI) of 40.0 to 44.9 in adult, unspecified obesity type     Past Surgical History:   Procedure Laterality Date     APPENDECTOMY       CARPAL TUNNEL RELEASE RT/LT       CHOLECYSTECTOMY, LAPOROSCOPIC      Cholecystectomy, Laparoscopic     COLONOSCOPY  9/04    diverticulosis     D & C       EYE SURGERY       HERNIORRHAPHY VENTRAL N/A 11/15/2015    Procedure: HERNIORRHAPHY VENTRAL;  Surgeon: Rell Green MD;  Location: [facility] OR     HYSTERECTOMY, SMITA      one ovary left     INCISE FINGER TENDON SHEATH      right middle finger     SURGICAL HISTORY OF -   6/08    pubovaginal sling     SURGICAL HISTORY OF -       left knee replacement     SURGICAL HISTORY OF -   2006    right thumb joint replacement     SURGICAL HISTORY OF -   1/09    left thumb joint replacement     SURGICAL HISTORY OF -   11/4/13    resection of urachal mass, parital cystectomy sigmoid colectomy. 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 Father         under age 55     Diabetes Father      Hypertension Father      Cerebrovascular Disease Father      Neurologic Disorder Father         dementia; Parkinson's     Diabetes Mother      Hypertension Mother      Cerebrovascular Disease Mother       Circulatory Mother         ITP     Gastrointestinal Disease Mother         severe diverticulitis     Lipids Sister      Obesity Father      Obesity Mother      Obesity Sister      Arthritis Sister      Heart Disease Mother         paroxysmal a fib     Arthritis Mother      Allergies Mother      Blood Disease Mother      Cerebrovascular Disease Mother      Breast Cancer Maternal Aunt      Breast Cancer Maternal Aunt          Current Outpatient Medications   Medication Sig Dispense Refill     albuterol (PROAIR HFA, PROVENTIL HFA, VENTOLIN HFA) 108 (90 BASE) MCG/ACT inhaler Inhale 2 puffs into the lungs every 6 hours       aspirin 81 MG tablet Take 1 tablet by mouth daily. 100 tablet 3     atenolol (TENORMIN) 25 MG tablet Take 1 tablet (25 mg) by mouth daily 90 tablet 1     budesonide-formoterol (SYMBICORT) 80-4.5 MCG/ACT inhaler 1 puff 2 times daily. Pulmonology is filling this 1 Inhaler      COMPOUNDED NON-CONTROLLED SUBSTANCE (CMPD RX) - PHARMACY TO MIX COMPOUNDED MEDICATION Estradiol 0.02% cream (contains 0.2mg estradiol/gm of cream)- apply 1 gram daily to the vaginal area for one week then 1g 2-3 times weekly 30 g 11     ketoconazole (NIZORAL) 2 % external cream Apply  topically daily as needed. 30 g prn     MULTI-VITAMIN OR TABS 1 po daily  0     OMEGA 3 550 MG OR CAPS 1200 mg daily  0     potassium chloride ER (K-DUR/KLOR-CON M) 20 MEQ CR tablet TAKE 1 TABLET(20 MEQ) BY MOUTH DAILY 90 tablet 1     PREMARIN cream Place 1 g vaginally twice a week 30 g 4     rosuvastatin (CRESTOR) 10 MG tablet Take 1 tablet (10 mg) by mouth daily 90 tablet 1     triamterene-HCTZ (MAXZIDE) 75-50 MG tablet TAKE 1/2 TO 1 TABLET BY MOUTH DAILY 90 tablet 1     TYLENOL 325 MG OR TABS ONE TO TWO TABLETS EVERY 4 TO 6 HOURS AS NEEDED FOR PAIN 100 0     valACYclovir (VALTREX) 1000 mg tablet TAKE 2 TABLETS BY MOUTH TWICE DAILY 12 tablet 1     acyclovir (ZOVIRAX) 5 % ointment Apply  topically 5 times daily. 15 g prn     ranitidine (ZANTAC)  "150 MG capsule Take 1 capsule (150 mg) by mouth daily With dinner 30 capsule 5     Allergies   Allergen Reactions     Advicor Hives     Is a combination medication of niacin and lovastatin     Hylan G-F 20 Swelling     Meperidine Hcl Hives     Methocarbamol Hives     BP Readings from Last 3 Encounters:   01/15/19 130/78   07/09/18 148/80   04/05/18 140/74    Wt Readings from Last 3 Encounters:   01/15/19 109.7 kg (241 lb 12.8 oz)   07/09/18 115.2 kg (254 lb)   04/05/18 116.5 kg (256 lb 12.8 oz)                    Reviewed and updated as needed this visit by clinical staff  Tobacco  Allergies  Meds  Problems       Reviewed and updated as needed this visit by Provider         ROS:  Constitutional, HEENT, cardiovascular, pulmonary, gi and gu systems are negative, except as otherwise noted.    5 days of palpitations last fall that spontaneously resolved  Previously on Holter  DEXA 2010 - osteopenia      OBJECTIVE:     /78 (BP Location: Left arm, Patient Position: Sitting, Cuff Size: Adult Large)   Pulse 70   Temp 98.5  F (36.9  C) (Oral)   Ht 1.6 m (5' 3\")   Wt 109.7 kg (241 lb 12.8 oz)   SpO2 98%   BMI 42.83 kg/m    Body mass index is 42.83 kg/m .     GENERAL: healthy, alert and no distress  EYES: Eyes grossly normal to inspection, PERRL and conjunctivae and sclerae normal  HENT: ear canals and TM's normal, nose and mouth without ulcers or lesions  NECK: no adenopathy, no asymmetry, masses, or scars and thyroid normal to palpation  RESP: lungs clear to auscultation - no rales, rhonchi or wheezes  CV: regular rate and rhythm, normal S1 S2, no S3 or S4, no murmur, click or rub, no peripheral edema and peripheral pulses strong  ABDOMEN: soft, nontender, no hepatosplenomegaly, no masses and bowel sounds normal  MS: no gross musculoskeletal defects noted, no edema  SKIN: right ear 2 yellow papules 2 mm in canal  NEURO: Normal strength and tone, mentation intact and speech normal  PSYCH: mentation appears " normal, affect normal/bright        ASSESSMENT/PLAN:     (I10) Essential hypertension, benign  (primary encounter diagnosis)  Comment: controlled  Plan: atenolol (TENORMIN) 25 MG tablet, potassium         chloride ER (K-DUR/KLOR-CON M) 20 MEQ CR         tablet, triamterene-HCTZ (MAXZIDE) 75-50 MG         tablet, VENOUS COLLECTION, Comprehensive         metabolic panel            (B35.4) Tinea corporis  Comment: controlled  Plan: ketoconazole (NIZORAL) 2 % external cream            (E66.01) Morbid obesity due to excess calories (H)  Comment: controlled  Plan: ketoconazole (NIZORAL) 2 % external cream        Weight watchers    (N95.2) Vaginal atrophy  Comment: stable  Plan: COMPOUNDED NON-CONTROLLED SUBSTANCE (CMPD RX) -        PHARMACY TO MIX COMPOUNDED MEDICATION        Compound new due to cost    (E78.5) Hyperlipidemia LDL goal <130  Comment: stable  Plan: rosuvastatin (CRESTOR) 10 MG tablet,         Comprehensive metabolic panel, Lipid Profile            (R60.1) Generalized edema  Comment: stable  Plan: triamterene-HCTZ (MAXZIDE) 75-50 MG tablet            (B00.9) Herpes simplex virus (HSV) infection  Comment: stable  Plan: valACYclovir (VALTREX) 1000 mg tablet            (M85.80) Osteopenia, unspecified location  Comment: needs DEXA  Plan: CANCELED: Dexa hip/pelvis/spine*            (D49.2) Skin neoplasm  Comment: new  Plan: pt will schedule with SUNG Flores  [facility]

## 2019-01-15 NOTE — NURSING NOTE
Maria Victoria is here for med check fasting    Pre-visit Screening:  Immunizations:  up to date  Colonoscopy:  is up to date  Mammogram: is up to date  Asthma Action Test/Plan:  Na  PHQ9:  None  GAD7:  None  Questioned patient about current smoking habits Pt. has never smoked.  Ok to leave detailed message on voice mail for today's visit only Yes, phone # [phone]

## 2019-01-16 ENCOUNTER — MYC MEDICAL ADVICE (OUTPATIENT)
Dept: FAMILY MEDICINE | Facility: CLINIC | Age: 69
End: 2019-01-16

## 2019-01-16 LAB
ALBUMIN SERPL-MCNC: 4.4 G/DL (ref 3.6–5.1)
ALBUMIN/GLOB SERPL: 1.8 (CALC) (ref 1–2.5)
ALP SERPL-CCNC: 41 U/L (ref 33–130)
ALT SERPL-CCNC: 19 U/L (ref 6–29)
AST SERPL-CCNC: 19 U/L (ref 10–35)
BILIRUB SERPL-MCNC: 0.5 MG/DL (ref 0.2–1.2)
BUN SERPL-MCNC: 13 MG/DL (ref 7–25)
BUN/CREATININE RATIO: ABNORMAL (CALC) (ref 6–22)
CALCIUM SERPL-MCNC: 9.9 MG/DL (ref 8.6–10.4)
CHLORIDE SERPLBLD-SCNC: 94 MMOL/L (ref 98–110)
CHOLEST SERPL-MCNC: 162 MG/DL
CHOLEST/HDLC SERPL: 2.6 (CALC)
CO2 SERPL-SCNC: 27 MMOL/L (ref 20–32)
CREAT SERPL-MCNC: 0.86 MG/DL (ref 0.5–0.99)
EGFR AFRICAN AMERICAN - QUEST: 81 ML/MIN/1.73M2
GFR SERPL CREATININE-BSD FRML MDRD: 70 ML/MIN/1.73M2
GLOBULIN, CALCULATED - QUEST: 2.4 G/DL (CALC) (ref 1.9–3.7)
GLUCOSE - QUEST: 91 MG/DL (ref 65–99)
HDLC SERPL-MCNC: 62 MG/DL
LDLC SERPL CALC-MCNC: 74 MG/DL (CALC)
NONHDLC SERPL-MCNC: 100 MG/DL (CALC)
POTASSIUM SERPL-SCNC: 3.4 MMOL/L (ref 3.5–5.3)
PROT SERPL-MCNC: 6.8 G/DL (ref 6.1–8.1)
SODIUM SERPL-SCNC: 133 MMOL/L (ref 135–146)
TRIGL SERPL-MCNC: 164 MG/DL

## 2019-01-16 ASSESSMENT — ASTHMA QUESTIONNAIRES: ACT_TOTALSCORE: 25

## 2019-01-17 ENCOUNTER — TRANSFERRED RECORDS (OUTPATIENT)
Dept: HEALTH INFORMATION MANAGEMENT | Facility: CLINIC | Age: 69
End: 2019-01-17

## 2019-01-17 ENCOUNTER — TELEPHONE (OUTPATIENT)
Dept: FAMILY MEDICINE | Facility: CLINIC | Age: 69
End: 2019-01-17

## 2019-01-17 DIAGNOSIS — I10 ESSENTIAL HYPERTENSION, BENIGN: Primary | ICD-10-CM

## 2019-01-17 RX ORDER — POTASSIUM CHLORIDE 750 MG/1
10 TABLET, EXTENDED RELEASE ORAL DAILY
Qty: 90 TABLET | Refills: 0 | Status: SHIPPED | OUTPATIENT
Start: 2019-01-17 | End: 2019-04-15

## 2019-01-17 NOTE — TELEPHONE ENCOUNTER
LM   Potassium and sodium slightly low  Increase to 30 mEQ Potassium    See me back in clinic in 2 weeks for recheck.    Jacqui Landers PA-C  1/17/2019

## 2019-01-17 NOTE — TELEPHONE ENCOUNTER
From: Maria Victoria Arcos  To: Jacqui Landers PA  Sent: 1/16/2019 8:20 PM CST  Subject: A follow-up question for a visit within the past seven days    Hi - I found results for a recent bone scan, so I don't think I need to have another one right now.   I have a copy of the results that I will drop off at your office soon. Let me know what you think after you review it.     Thanks, Maria Victoria Arcos

## 2019-01-25 ENCOUNTER — TRANSFERRED RECORDS (OUTPATIENT)
Dept: FAMILY MEDICINE | Facility: CLINIC | Age: 69
End: 2019-01-25

## 2019-02-05 ENCOUNTER — OFFICE VISIT (OUTPATIENT)
Dept: FAMILY MEDICINE | Facility: CLINIC | Age: 69
End: 2019-02-05

## 2019-02-05 VITALS
HEART RATE: 61 BPM | TEMPERATURE: 98.5 F | HEIGHT: 63 IN | BODY MASS INDEX: 43.05 KG/M2 | OXYGEN SATURATION: 98 % | SYSTOLIC BLOOD PRESSURE: 130 MMHG | WEIGHT: 243 LBS | DIASTOLIC BLOOD PRESSURE: 78 MMHG

## 2019-02-05 DIAGNOSIS — I10 ESSENTIAL HYPERTENSION, BENIGN: Primary | ICD-10-CM

## 2019-02-05 DIAGNOSIS — E66.01 MORBID OBESITY DUE TO EXCESS CALORIES (H): ICD-10-CM

## 2019-02-05 PROCEDURE — 99213 OFFICE O/P EST LOW 20 MIN: CPT | Performed by: PHYSICIAN ASSISTANT

## 2019-02-05 PROCEDURE — 36415 COLL VENOUS BLD VENIPUNCTURE: CPT | Performed by: PHYSICIAN ASSISTANT

## 2019-02-05 ASSESSMENT — MIFFLIN-ST. JEOR: SCORE: 1606.37

## 2019-02-05 NOTE — NURSING NOTE
Maria Victoria is here to recheck potassium levels.            Pre-visit Screening:  Immunizations:  up to date  Colonoscopy:  is up to date  Mammogram: is up to date  Asthma Action Test/Plan:  NA  PHQ9:  NA  GAD7:  NA  Questioned patient about current smoking habits Pt. has never smoked.  Ok to leave detailed message on voice mail for today's visit only Yes, phone # [phone]

## 2019-02-05 NOTE — PROGRESS NOTES
SUBJECTIVE:   Maria Victoria Arcos is a 68 year old female who presents to clinic today for the following health issues:      Potassium low at last OV  Increased potassium to 30 meQ    Specifically denies chest pain, palpitations, dyspnea, or peripheral edema.         BP Readings from Last 6 Encounters:   02/05/19 130/78   01/15/19 130/78   07/09/18 148/80   04/05/18 140/74   11/14/17 122/70   08/25/17 124/84           Problem list and histories reviewed & adjusted, as indicated.  Additional history: as documented    Patient Active Problem List   Diagnosis     Generalized osteoarthrosis, unspecified site     Herpes simplex virus (HSV) infection     Sleep apnea     Intermittent asthma     Impaired fasting glucose     Factor V Leiden mutation (H)     Health Care Home     HCD (health care directive)     Hyperlipidemia LDL goal <130     Rosacea     Basal cell carcinoma of face     Ventral hernia     ACP (advance care planning)     Morbid obesity due to excess calories (H)     Cranial third nerve paresis, left/ diplopia     Gastroesophageal reflux disease without esophagitis     Essential hypertension, benign     Class 3 obesity without serious comorbidity with body mass index (BMI) of 40.0 to 44.9 in adult, unspecified obesity type     Past Surgical History:   Procedure Laterality Date     APPENDECTOMY       CARPAL TUNNEL RELEASE RT/LT       CHOLECYSTECTOMY, LAPOROSCOPIC      Cholecystectomy, Laparoscopic     COLONOSCOPY  9/04    diverticulosis     D & C       EYE SURGERY       HERNIORRHAPHY VENTRAL N/A 11/15/2015    Procedure: HERNIORRHAPHY VENTRAL;  Surgeon: Rell Green MD;  Location: [facility] OR     HYSTERECTOMY, SMITA      one ovary left     INCISE FINGER TENDON SHEATH      right middle finger     SURGICAL HISTORY OF -   6/08    pubovaginal sling     SURGICAL HISTORY OF -       left knee replacement     SURGICAL HISTORY OF -   2006    right thumb joint replacement     SURGICAL HISTORY OF -   1/09    left thumb  joint replacement     SURGICAL HISTORY OF -   11/4/13    resection of urachal mass, parital cystectomy sigmoid colectomy. 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 Father         under age 55     Diabetes Father      Hypertension Father      Cerebrovascular Disease Father      Neurologic Disorder Father         dementia; Parkinson's     Diabetes Mother      Hypertension Mother      Cerebrovascular Disease Mother      Circulatory Mother         ITP     Gastrointestinal Disease Mother         severe diverticulitis     Lipids Sister      Obesity Father      Obesity Mother      Obesity Sister      Arthritis Sister      Heart Disease Mother         paroxysmal a fib     Arthritis Mother      Allergies Mother      Blood Disease Mother      Cerebrovascular Disease Mother      Breast Cancer Maternal Aunt      Breast Cancer Maternal Aunt          Current Outpatient Medications   Medication Sig Dispense Refill     acyclovir (ZOVIRAX) 5 % ointment Apply  topically 5 times daily. 15 g prn     albuterol (PROAIR HFA, PROVENTIL HFA, VENTOLIN HFA) 108 (90 BASE) MCG/ACT inhaler Inhale 2 puffs into the lungs every 6 hours       aspirin 81 MG tablet Take 1 tablet by mouth daily. 100 tablet 3     atenolol (TENORMIN) 25 MG tablet Take 1 tablet (25 mg) by mouth daily 90 tablet 1     budesonide-formoterol (SYMBICORT) 80-4.5 MCG/ACT inhaler 1 puff 2 times daily. Pulmonology is filling this 1 Inhaler      COMPOUNDED NON-CONTROLLED SUBSTANCE (CMPD RX) - PHARMACY TO MIX COMPOUNDED MEDICATION Estradiol 0.02% cream (contains 0.2mg  "estradiol/gm of cream)- apply 1 gram daily to the vaginal area for one week then 1g 2-3 times weekly 30 g 11     ketoconazole (NIZORAL) 2 % external cream Apply  topically daily as needed. 30 g prn     MULTI-VITAMIN OR TABS 1 po daily  0     OMEGA 3 550 MG OR CAPS 1200 mg daily  0     potassium chloride ER (K-DUR/KLOR-CON M) 10 MEQ CR tablet Take 1 tablet (10 mEq) by mouth daily 90 tablet 0     potassium chloride ER (K-DUR/KLOR-CON M) 20 MEQ CR tablet TAKE 1 TABLET(20 MEQ) BY MOUTH DAILY 90 tablet 1     PREMARIN cream Place 1 g vaginally twice a week 30 g 4     ranitidine (ZANTAC) 150 MG capsule Take 1 capsule (150 mg) by mouth daily With dinner 30 capsule 5     rosuvastatin (CRESTOR) 10 MG tablet Take 1 tablet (10 mg) by mouth daily 90 tablet 1     triamterene-HCTZ (MAXZIDE) 75-50 MG tablet TAKE 1/2 TO 1 TABLET BY MOUTH DAILY 90 tablet 1     TYLENOL 325 MG OR TABS ONE TO TWO TABLETS EVERY 4 TO 6 HOURS AS NEEDED FOR PAIN 100 0     valACYclovir (VALTREX) 1000 mg tablet TAKE 2 TABLETS BY MOUTH TWICE DAILY 12 tablet 1     Allergies   Allergen Reactions     Advicor Hives     Is a combination medication of niacin and lovastatin     Hylan G-F 20 Swelling     Meperidine Hcl Hives     Methocarbamol Hives     BP Readings from Last 3 Encounters:   02/05/19 130/78   01/15/19 130/78   07/09/18 148/80    Wt Readings from Last 3 Encounters:   02/05/19 110.2 kg (243 lb)   01/15/19 109.7 kg (241 lb 12.8 oz)   07/09/18 115.2 kg (254 lb)                    Reviewed and updated as needed this visit by clinical staff  Tobacco  Allergies  Meds  Problems       Reviewed and updated as needed this visit by Provider         ROS:  Constitutional, HEENT, cardiovascular, pulmonary, gi and gu systems are negative, except as otherwise noted.    OBJECTIVE:     /78 (BP Location: Left arm, Patient Position: Sitting, Cuff Size: Adult Large)   Pulse 61   Temp 98.5  F (36.9  C) (Oral)   Ht 1.6 m (5' 3\")   Wt 110.2 kg (243 lb)   SpO2 98% " "  BMI 43.05 kg/m    Body mass index is 43.05 kg/m .     GENERAL: healthy, alert and no distress  EYES: Eyes grossly normal to inspection, PERRL and conjunctivae and sclerae normal  HENT: ear canals and TM's normal, nose and mouth without ulcers or lesions  NECK: no adenopathy, no asymmetry, masses, or scars and thyroid normal to palpation  RESP: lungs clear to auscultation - no rales, rhonchi or wheezes  CV: regular rate and rhythm, normal S1 S2, no S3 or S4, no murmur, click or rub, no peripheral edema and peripheral pulses strong  MS: no gross musculoskeletal defects noted, no edema  SKIN: no suspicious lesions or rashes  NEURO: Normal strength and tone, mentation intact and speech normal  PSYCH: mentation appears normal, affect normal/bright        ASSESSMENT/PLAN:       BMI:   Estimated body mass index is 43.05 kg/m  as calculated from the following:    Height as of this encounter: 1.6 m (5' 3\").    Weight as of this encounter: 110.2 kg (243 lb).   Weight management plan: Discussed healthy diet and exercise guidelines        ICD-10-CM    1. Essential hypertension, benign I10 Basic metabolic panel     VENOUS COLLECTION   2. Morbid obesity due to excess calories (H) E66.01        Potassium and sodium now normal  Follow-up 6 months        SUNG Hou  [facility]    "

## 2019-02-06 LAB
BUN SERPL-MCNC: 11 MG/DL (ref 7–25)
BUN/CREATININE RATIO: ABNORMAL (CALC) (ref 6–22)
CALCIUM SERPL-MCNC: 9.8 MG/DL (ref 8.6–10.4)
CHLORIDE SERPLBLD-SCNC: 97 MMOL/L (ref 98–110)
CO2 SERPL-SCNC: 29 MMOL/L (ref 20–32)
CREAT SERPL-MCNC: 0.9 MG/DL (ref 0.5–0.99)
EGFR AFRICAN AMERICAN - QUEST: 77 ML/MIN/1.73M2
GFR SERPL CREATININE-BSD FRML MDRD: 66 ML/MIN/1.73M2
GLUCOSE - QUEST: 91 MG/DL (ref 65–99)
POTASSIUM SERPL-SCNC: 4 MMOL/L (ref 3.5–5.3)
SODIUM SERPL-SCNC: 136 MMOL/L (ref 135–146)

## 2019-04-12 ENCOUNTER — MYC MEDICAL ADVICE (OUTPATIENT)
Dept: FAMILY MEDICINE | Facility: CLINIC | Age: 69
End: 2019-04-12

## 2019-04-12 DIAGNOSIS — I10 ESSENTIAL HYPERTENSION, BENIGN: ICD-10-CM

## 2019-04-13 NOTE — TELEPHONE ENCOUNTER
From: Maria Victoria Arcos  To: Jacqui Landers PA  Sent: 4/12/2019 4:34 PM CDT  Subject: Question about medications    When I was there in Jan, my potassium level was slightly low. I was placed on an additional 10 mEq K+ tab each day. That Rx is now gone. Should I just go back to my original dose, which is 20 mEq K+ daily and forget about this Rx? Do I need to have the blood level rechecked?    Thanks, Maria Victoria Arcos

## 2019-04-14 DIAGNOSIS — I10 ESSENTIAL HYPERTENSION, BENIGN: ICD-10-CM

## 2019-04-15 RX ORDER — POTASSIUM CHLORIDE 750 MG/1
10 TABLET, EXTENDED RELEASE ORAL DAILY
Qty: 90 TABLET | Refills: 0 | Status: SHIPPED | OUTPATIENT
Start: 2019-04-15 | End: 2019-06-03

## 2019-04-15 RX ORDER — POTASSIUM CHLORIDE 750 MG/1
TABLET, EXTENDED RELEASE ORAL
Qty: 90 TABLET | Refills: 0 | OUTPATIENT
Start: 2019-04-15

## 2019-04-15 NOTE — TELEPHONE ENCOUNTER
Pt has 2 prescriptions for k+.  Please advise if she needs to continue both    Maria Victoria Arcos is requesting a refill of:    Pending Prescriptions:                       Disp   Refills    potassium chloride ER (K-DUR/KLOR-CON M) *90 tab*0            Sig: TAKE ONE TABLET BY MOUTH DAILY

## 2019-04-15 NOTE — TELEPHONE ENCOUNTER
Signed via [employer]  Continue on 20+10    Lab only recheck    Pt notified via [employer]    Jacqui Landers PA-C  4/15/2019

## 2019-04-22 ENCOUNTER — TELEPHONE (OUTPATIENT)
Dept: FAMILY MEDICINE | Facility: CLINIC | Age: 69
End: 2019-04-22

## 2019-04-22 DIAGNOSIS — I10 ESSENTIAL HYPERTENSION, BENIGN: ICD-10-CM

## 2019-04-22 NOTE — TELEPHONE ENCOUNTER
Maria Victoria Arcos is requesting a refill of:    Pending Prescriptions:                       Disp   Refills    potassium chloride ER (K-DUR/KLOR-CON M) *90 tab*0            Sig: TAKE 1 TABLET(20 MEQ) BY MOUTH DAILY    ThanksMariah

## 2019-04-23 RX ORDER — POTASSIUM CHLORIDE 1500 MG/1
TABLET, EXTENDED RELEASE ORAL
Qty: 90 TABLET | Refills: 0 | Status: SHIPPED | OUTPATIENT
Start: 2019-04-23 | End: 2019-06-03

## 2019-04-23 NOTE — TELEPHONE ENCOUNTER
Please call pt  She is due for lab only recheck on potassium  I sent in the prescription she requested but I also need to see what her current potassium level is    Order in standing    Jacqui Landers PA-C  4/23/2019

## 2019-04-23 NOTE — TELEPHONE ENCOUNTER
Spoke with pt, she stated she is taking 2 in the morning and 1 in the evening of her potassium chloride 20 MEQ tablets. Pt has upcoming appt on 05/07/19 she is wondering if that is ok to do potassium recheck then?

## 2019-05-07 DIAGNOSIS — I10 ESSENTIAL HYPERTENSION, BENIGN: Primary | ICD-10-CM

## 2019-05-07 LAB — POTASSIUM SERPL-SCNC: 4.61 MMOL/L (ref 3.5–5.3)

## 2019-05-07 PROCEDURE — 36415 COLL VENOUS BLD VENIPUNCTURE: CPT | Performed by: FAMILY MEDICINE

## 2019-05-07 PROCEDURE — 84132 ASSAY OF SERUM POTASSIUM: CPT | Performed by: FAMILY MEDICINE

## 2019-05-22 ENCOUNTER — TRANSFERRED RECORDS (OUTPATIENT)
Dept: FAMILY MEDICINE | Facility: CLINIC | Age: 69
End: 2019-05-22

## 2019-05-28 ENCOUNTER — OFFICE VISIT (OUTPATIENT)
Dept: FAMILY MEDICINE | Facility: CLINIC | Age: 69
End: 2019-05-28

## 2019-05-28 VITALS
HEART RATE: 80 BPM | DIASTOLIC BLOOD PRESSURE: 90 MMHG | WEIGHT: 242 LBS | SYSTOLIC BLOOD PRESSURE: 138 MMHG | BODY MASS INDEX: 42.87 KG/M2 | RESPIRATION RATE: 20 BRPM | OXYGEN SATURATION: 96 %

## 2019-05-28 DIAGNOSIS — R10.32 LLQ ABDOMINAL PAIN: Primary | ICD-10-CM

## 2019-05-28 LAB
% GRANULOCYTES: 67.7 %
HCT VFR BLD AUTO: 40.8 % (ref 35–47)
HEMOGLOBIN: 13.3 G/DL (ref 11.7–15.7)
LYMPHOCYTES NFR BLD AUTO: 23.7 %
MCH RBC QN AUTO: 27.8 PG (ref 26–33)
MCHC RBC AUTO-ENTMCNC: 32.6 G/DL (ref 31–36)
MCV RBC AUTO: 85.4 FL (ref 78–100)
MONOCYTES NFR BLD AUTO: 8.6 %
PLATELET COUNT - QUEST: 201 10^9/L (ref 150–375)
RBC # BLD AUTO: 4.78 10*12/L (ref 3.8–5.2)
WBC # BLD AUTO: 8.9 10*9/L (ref 4–11)

## 2019-05-28 PROCEDURE — 99213 OFFICE O/P EST LOW 20 MIN: CPT | Performed by: PHYSICIAN ASSISTANT

## 2019-05-28 PROCEDURE — 85025 COMPLETE CBC W/AUTO DIFF WBC: CPT | Performed by: PHYSICIAN ASSISTANT

## 2019-05-28 PROCEDURE — 36415 COLL VENOUS BLD VENIPUNCTURE: CPT | Performed by: PHYSICIAN ASSISTANT

## 2019-05-28 NOTE — NURSING NOTE
Chief Complaint   Patient presents with     Abdominal Pain     feels that she has an abdominal hernia, has had one before and is having the same symptoms, pain is more so on and off as far as digestion and eating, last had imaging done in November 2018     Pre-visit Screening:  Immunizations:  up to date  Colonoscopy:  is up to date  Mammogram: is up to date  Asthma Action Test/Plan:  ACT given today   PHQ9:  NA  GAD7:  NA  Questioned patient about current smoking habits Pt. has never smoked.  Ok to leave detailed message on voice mail for today's visit only Yes, phone # [phone]

## 2019-05-28 NOTE — PROGRESS NOTES
"Subjective     Maria Victoria Arcos is a 68 year old female who presents to clinic today for the following health issues:    In clinic today, I had a physician assistant student with me.  The student participated in the office visit with the permission of the patient.      HPI     Abdominal pain.   History of surgical hernias and they're typically not a problem. 2-3 weeks ago she had a big sneeze while in the car and she was wearing a seatbelt and \"felt a pop.\" Pain improved but worsened again over the past few days. Pain is worse when she has to pass a stool. Pain wakes her up at night and is described as a burning, sharp pain. At its worst its an 8/10 and when it improves it's a 2/10. No change in bowel patterns. Full liquids/clear liquids for a few days and then it's improved. When she eats solids again the pain worsens. HOlding it in and heating pads improve the pain. No dysuria, frequency, vaginal discharge.     PSH: hysterectomy, inclusion cyst behind ovaries, abscess in bowel so 1/3 of bowel removed, 1/2 bladder removed b/c mass had infiltrated, appendicitis, bladder sling, cholecystectomy. Still has 1 ovary (believe it's on the left).       Patient Active Problem List   Diagnosis     Generalized osteoarthrosis, unspecified site     Herpes simplex virus (HSV) infection     Sleep apnea     Intermittent asthma     Impaired fasting glucose     Factor V Leiden mutation (H)     Health Care Home     HCD (health care directive)     Hyperlipidemia LDL goal <130     Rosacea     Basal cell carcinoma of face     Ventral hernia     ACP (advance care planning)     Morbid obesity due to excess calories (H)     Cranial third nerve paresis, left/ diplopia     Gastroesophageal reflux disease without esophagitis     Essential hypertension, benign     Class 3 obesity without serious comorbidity with body mass index (BMI) of 40.0 to 44.9 in adult, unspecified obesity type     Past Surgical History:   Procedure Laterality Date     " APPENDECTOMY       CARPAL TUNNEL RELEASE RT/LT       CHOLECYSTECTOMY, LAPOROSCOPIC      Cholecystectomy, Laparoscopic     COLONOSCOPY  9/04    diverticulosis     D & C       EYE SURGERY       HERNIORRHAPHY VENTRAL N/A 11/15/2015    Procedure: HERNIORRHAPHY VENTRAL;  Surgeon: Rell Green MD;  Location: [facility] OR     HYSTERECTOMY, SMITA      one ovary      INCISE FINGER TENDON SHEATH      right middle finger     SURGICAL HISTORY OF -   6/08    pubovaginal sling     SURGICAL HISTORY OF -       left knee replacement     SURGICAL HISTORY OF -   2006    right thumb joint replacement     SURGICAL HISTORY OF -   1/09    left thumb joint replacement     SURGICAL HISTORY OF -   11/4/13    resection of urachal mass, parital cystectomy sigmoid colectomy. 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 Father         under age 55     Diabetes Father      Hypertension Father      Cerebrovascular Disease Father      Neurologic Disorder Father         dementia; Parkinson's     Diabetes Mother      Hypertension Mother      Cerebrovascular Disease Mother      Circulatory Mother         ITP     Gastrointestinal Disease Mother         severe diverticulitis     Lipids Sister      Obesity Father      Obesity Mother      Obesity Sister      Arthritis Sister      Heart Disease Mother         paroxysmal a fib     Arthritis Mother      Allergies Mother      Blood Disease Mother      Cerebrovascular Disease Mother      Breast Cancer Maternal Aunt      Breast Cancer Maternal Aunt          Current Outpatient Medications   Medication Sig  Dispense Refill     albuterol (PROAIR HFA, PROVENTIL HFA, VENTOLIN HFA) 108 (90 BASE) MCG/ACT inhaler Inhale 2 puffs into the lungs every 6 hours       aspirin 81 MG tablet Take 1 tablet by mouth daily. 100 tablet 3     atenolol (TENORMIN) 25 MG tablet Take 1 tablet (25 mg) by mouth daily 90 tablet 1     budesonide-formoterol (SYMBICORT) 80-4.5 MCG/ACT inhaler 1 puff 2 times daily. Pulmonology is filling this 1 Inhaler      COMPOUNDED NON-CONTROLLED SUBSTANCE (CMPD RX) - PHARMACY TO MIX COMPOUNDED MEDICATION Estradiol 0.02% cream (contains 0.2mg estradiol/gm of cream)- apply 1 gram daily to the vaginal area for one week then 1g 2-3 times weekly 30 g 11     ketoconazole (NIZORAL) 2 % external cream Apply  topically daily as needed. 30 g prn     MULTI-VITAMIN OR TABS 1 po daily  0     OMEGA 3 550 MG OR CAPS 1200 mg daily  0     potassium chloride ER (K-DUR/KLOR-CON M) 10 MEQ CR tablet Take 1 tablet (10 mEq) by mouth daily 90 tablet 0     potassium chloride ER (K-DUR/KLOR-CON M) 20 MEQ CR tablet TAKE 1 TABLET(20 MEQ) BY MOUTH DAILY 90 tablet 0     PREMARIN cream Place 1 g vaginally twice a week 30 g 4     ranitidine (ZANTAC) 150 MG capsule Take 1 capsule (150 mg) by mouth daily With dinner 30 capsule 5     rosuvastatin (CRESTOR) 10 MG tablet Take 1 tablet (10 mg) by mouth daily 90 tablet 1     triamterene-HCTZ (MAXZIDE) 75-50 MG tablet TAKE 1/2 TO 1 TABLET BY MOUTH DAILY 90 tablet 1     TYLENOL 325 MG OR TABS ONE TO TWO TABLETS EVERY 4 TO 6 HOURS AS NEEDED FOR PAIN 100 0     acyclovir (ZOVIRAX) 5 % ointment Apply  topically 5 times daily. (Patient not taking: Reported on 5/28/2019) 15 g prn     valACYclovir (VALTREX) 1000 mg tablet TAKE 2 TABLETS BY MOUTH TWICE DAILY (Patient not taking: Reported on 5/28/2019) 12 tablet 1     Allergies   Allergen Reactions     Advicor Hives     Is a combination medication of niacin and lovastatin     Hylan G-F 20 Swelling     Meperidine Hcl Hives     Methocarbamol Hives     Recent Labs    Lab Test 05/07/19  1417 02/05/19  1432 01/15/19  1340 07/09/18  1100 07/09/18  1055 11/15/17  0932  12/03/15  1316  11/16/15  0546  04/03/15  0906 01/13/15 04/16/14  0936   A1C  --   --   --  5.7  --   --   --   --   --   --   --  6.0  --  6.1*   LDL  --   --  74  --  70 78   < >  --   --   --   --  48  --  62   HDL  --   --  62  --  47* 44*   < >  --   --   --   --  46*  --  35*   TRIG  --   --  164*  --  189* 169*   < >  --   --   --   --  204*  --  258*   ALT  --   --  19  --  25 19   < >  --   --   --    < > 33  --  36   CR  --  0.90 0.86  --  0.89 0.82   < > 0.88  --  0.70   < > 0.91  --  0.83   GFRESTIMATED  --  66 70  --  67 75   < > 64  --  83   < > 62  --  70   GFRESTBLACK  --   --   --   --   --   --   --  78  --  >90   GFR Calc     < > 75  --  84   POTASSIUM 4.61 4.0 3.4*  --  3.7 3.7   < > 3.4   < > 3.2*   < > 3.9  --  4.3   TSH  --   --   --   --   --   --   --   --   --   --   --   --  2.8  2.8  --     < > = values in this interval not displayed.      BP Readings from Last 3 Encounters:   05/28/19 138/90   02/05/19 130/78   01/15/19 130/78    Wt Readings from Last 3 Encounters:   05/28/19 109.8 kg (242 lb)   02/05/19 110.2 kg (243 lb)   01/15/19 109.7 kg (241 lb 12.8 oz)                    -------------------------------------  Reviewed and updated as needed this visit by Provider         Review of Systems   ROS COMP: Constitutional, HEENT, cardiovascular, pulmonary, gi and gu systems are negative, except as otherwise noted.      Objective    /90 (BP Location: Right arm, Patient Position: Sitting, Cuff Size: Adult Regular)   Pulse 80   Resp 20   Wt 109.8 kg (242 lb)   SpO2 96%   BMI 42.87 kg/m    Body mass index is 42.87 kg/m .       Physical Exam   GENERAL: healthy, alert and no distress  EYES: Eyes grossly normal to inspection, PERRL and conjunctivae and sclerae normal  HENT: ear canals and TM's normal, nose and mouth without ulcers or lesions  NECK: no adenopathy, no  asymmetry, masses, or scars and thyroid normal to palpation  RESP: lungs clear to auscultation - no rales, rhonchi or wheezes  CV: regular rate and rhythm, normal S1 S2, no S3 or S4, no murmur, click or rub, no peripheral edema and peripheral pulses strong  ABDOMEN: soft, there is moderate tenderness just lateral to the midline incision scar on the lower left abdomen.  No guarding or rebound tenderness.  Hernia non-palpable.  N hepatosplenomegaly, no masses and bowel sounds normal  Gu: atrophic appearance of external genitalia, vaginal mucosa. No palpable abnormalities on bimanual exam, no cervical motion tenderness.  MS: no gross musculoskeletal defects noted, no edema  SKIN: no suspicious lesions or rashes  NEURO: Normal strength and tone, mentation intact and speech normal  PSYCH: mentation appears normal, affect normal/bright    Diagnostic Test Results:  Labs reviewed in Epic  No results found for this or any previous visit (from the past 24 hour(s)).        Assessment & Plan       ICD-10-CM    1. LLQ abdominal pain R10.32 CL AFF HEMOGRAM/PLATE/DIFF ([facility])     VENOUS COLLECTION        Discussed case with Dr. Brasher  No indication for repeat CT today, however I would like her to schedule with her surgeon at [facility] to discuss recurrent hernia suspected.     Go to [facility] ED with any worsening pain/lack of BM > 24 hours.    Pt agreed with plan.       SUNG Hou  [facility]

## 2019-05-29 ASSESSMENT — ASTHMA QUESTIONNAIRES: ACT_TOTALSCORE: 25

## 2019-06-03 DIAGNOSIS — I10 ESSENTIAL HYPERTENSION, BENIGN: ICD-10-CM

## 2019-06-03 DIAGNOSIS — R60.1 GENERALIZED EDEMA: ICD-10-CM

## 2019-06-03 RX ORDER — POTASSIUM CHLORIDE 1500 MG/1
TABLET, EXTENDED RELEASE ORAL
Qty: 90 TABLET | Refills: 0 | Status: SHIPPED | OUTPATIENT
Start: 2019-06-03 | End: 2019-08-22

## 2019-06-03 RX ORDER — TRIAMTERENE AND HYDROCHLOROTHIAZIDE 75; 50 MG/1; MG/1
TABLET ORAL
Qty: 90 TABLET | Refills: 0 | Status: SHIPPED | OUTPATIENT
Start: 2019-06-03 | End: 2019-08-22

## 2019-06-03 RX ORDER — POTASSIUM CHLORIDE 750 MG/1
10 TABLET, EXTENDED RELEASE ORAL DAILY
Qty: 90 TABLET | Refills: 0 | Status: SHIPPED | OUTPATIENT
Start: 2019-06-03 | End: 2019-08-22

## 2019-06-03 NOTE — TELEPHONE ENCOUNTER
Called pt for Follow-up    Had hard BM this weekend and now abd pain is better      Recheck BP in clinic Roger Landers PA-C  6/3/2019

## 2019-06-03 NOTE — TELEPHONE ENCOUNTER
Maria Victoria Arcos is requesting a refill of:    Pending Prescriptions:                       Disp   Refills    triamterene-HCTZ (MAXZIDE) 75-50 MG [id number] tab*0            Sig: TAKE 1/2 TO 1 TABLET BY MOUTH DAILY    Please close encounter if RX was sent. Thanks, Mariah

## 2019-07-10 DIAGNOSIS — E78.5 HYPERLIPIDEMIA LDL GOAL <130: ICD-10-CM

## 2019-07-10 DIAGNOSIS — I10 ESSENTIAL HYPERTENSION, BENIGN: ICD-10-CM

## 2019-07-10 RX ORDER — ATENOLOL 25 MG/1
TABLET ORAL
Qty: 30 TABLET | Refills: 0 | Status: SHIPPED | OUTPATIENT
Start: 2019-07-10 | End: 2019-08-22

## 2019-07-10 RX ORDER — ROSUVASTATIN CALCIUM 10 MG/1
TABLET, COATED ORAL
Qty: 30 TABLET | Refills: 0 | Status: SHIPPED | OUTPATIENT
Start: 2019-07-10 | End: 2019-08-22

## 2019-07-10 NOTE — TELEPHONE ENCOUNTER
Pending Prescriptions:                       Disp   Refills    rosuvastatin (CRESTOR) 10 MG tablet [Phar*30 tab*0            Sig: TAKE 1 TABLET(10 MG) BY MOUTH DAILY    atenolol (TENORMIN) 25 MG tablet [Pharmac*30 tab*0            Sig: TAKE 1 TABLET(25 MG) BY MOUTH DAILY    Pt is due for a FASTING OV   Please fax and send to   [facility]  [phone]

## 2019-07-17 ENCOUNTER — TRANSFERRED RECORDS (OUTPATIENT)
Dept: FAMILY MEDICINE | Facility: CLINIC | Age: 69
End: 2019-07-17

## 2019-08-22 ENCOUNTER — OFFICE VISIT (OUTPATIENT)
Dept: FAMILY MEDICINE | Facility: CLINIC | Age: 69
End: 2019-08-22

## 2019-08-22 VITALS
DIASTOLIC BLOOD PRESSURE: 80 MMHG | TEMPERATURE: 98.9 F | OXYGEN SATURATION: 97 % | HEART RATE: 62 BPM | WEIGHT: 238 LBS | BODY MASS INDEX: 42.16 KG/M2 | SYSTOLIC BLOOD PRESSURE: 128 MMHG

## 2019-08-22 DIAGNOSIS — I10 ESSENTIAL HYPERTENSION, BENIGN: Primary | ICD-10-CM

## 2019-08-22 DIAGNOSIS — R60.1 GENERALIZED EDEMA: ICD-10-CM

## 2019-08-22 DIAGNOSIS — E78.5 HYPERLIPIDEMIA LDL GOAL <130: ICD-10-CM

## 2019-08-22 PROCEDURE — 99213 OFFICE O/P EST LOW 20 MIN: CPT | Performed by: PHYSICIAN ASSISTANT

## 2019-08-22 RX ORDER — ATENOLOL 25 MG/1
25 TABLET ORAL DAILY
Qty: 90 TABLET | Refills: 1 | Status: SHIPPED | OUTPATIENT
Start: 2019-08-22 | End: 2019-12-10

## 2019-08-22 RX ORDER — ROSUVASTATIN CALCIUM 10 MG/1
10 TABLET, COATED ORAL DAILY
Qty: 90 TABLET | Refills: 1 | Status: SHIPPED | OUTPATIENT
Start: 2019-08-22 | End: 2019-12-10

## 2019-08-22 RX ORDER — TRIAMTERENE AND HYDROCHLOROTHIAZIDE 75; 50 MG/1; MG/1
1 TABLET ORAL DAILY
Qty: 90 TABLET | Refills: 1 | Status: SHIPPED | OUTPATIENT
Start: 2019-08-22 | End: 2019-12-10

## 2019-08-22 RX ORDER — HYDROXYZINE PAMOATE 25 MG/1
25 CAPSULE ORAL
COMMUNITY
Start: 2019-08-21 | End: 2021-01-14

## 2019-08-22 RX ORDER — POTASSIUM CHLORIDE 1500 MG/1
TABLET, EXTENDED RELEASE ORAL
Qty: 90 TABLET | Refills: 1 | Status: SHIPPED | OUTPATIENT
Start: 2019-08-22 | End: 2019-12-10

## 2019-08-22 RX ORDER — POTASSIUM CHLORIDE 750 MG/1
10 TABLET, EXTENDED RELEASE ORAL DAILY
Qty: 90 TABLET | Refills: 1 | Status: SHIPPED | OUTPATIENT
Start: 2019-08-22 | End: 2019-12-10

## 2019-08-22 NOTE — PROGRESS NOTES
CC: Medication Check    History:  HTN:  Takes triam/HCTZ as well as atenolol. Taking K-dur 30 MEQ daily. No side effects. Has lost 20 lbs. Has been exercising a lot later. Does not check BP at home. No chest pain, frequent headaches, vision changes. Occasional SOB with asthma flare managed by Dr. Grimes. Occasional HA, but these tend to resolve.     Mixed hyperlipidemia: Take Crestor 10 mg daily. Just had fasting labs done through [facility]- available through [facility], and will have copy scanned into chart.     PMH, MEDICATIONS, ALLERGIES, SOCIAL AND FAMILY HISTORY in [facility] and reviewed by me personally.    ROS negative other than the symptoms noted above in the HPI.     Examination   /80 (BP Location: Left arm, Patient Position: Sitting, Cuff Size: Adult Large)   Pulse 62   Temp 98.9  F (37.2  C)   Wt 108 kg (238 lb)   SpO2 97%   BMI 42.16 kg/m         Constitutional: Sitting comfortably, in no acute distress. Vital signs noted  Eyes: pupils equal round reactive to light and accomodation, extra ocular movements intact  Neck:  no adenopathy, trachea midline and normal to palpation  Cardiovascular:  regular rate and rhythm, no murmurs, clicks, or gallops  Respiratory:  normal respiratory rate and rhythm, lungs clear to auscultation  SKIN: No jaundice/pallor/rash.   Psychiatric: mentation appears normal and affect normal/bright        A/P    ICD-10-CM    1. Essential hypertension, benign I10 potassium chloride ER (K-DUR/KLOR-CON M) 20 MEQ CR tablet     potassium chloride ER (K-DUR/KLOR-CON M) 10 MEQ CR tablet     triamterene-HCTZ (MAXZIDE) 75-50 MG tablet     atenolol (TENORMIN) 25 MG tablet     CANCELED: Comprehensive Metobolic Panel ([facility])     CANCELED: VENOUS COLLECTION   2. Hyperlipidemia LDL goal <130 E78.5 rosuvastatin (CRESTOR) 10 MG tablet   3. Generalized edema R60.1 triamterene-HCTZ (MAXZIDE) 75-50 MG tablet       DISCUSSION:  1. Essential hypertension, benign  2. Generalized  tracy Irene is doing well. Congratulated her on her weight loss. Review labs through [place], no further concerns. Will refill at current dose for 6 months.   - potassium chloride ER (K-DUR/KLOR-CON M) 20 MEQ CR tablet; TAKE 1 TABLET(20 MEQ) BY MOUTH DAILY  Dispense: 90 tablet; Refill: 1  - potassium chloride ER (K-DUR/KLOR-CON M) 10 MEQ CR tablet; Take 1 tablet (10 mEq) by mouth daily  Dispense: 90 tablet; Refill: 1  - triamterene-HCTZ (MAXZIDE) 75-50 MG tablet; Take 1 tablet by mouth daily  Dispense: 90 tablet; Refill: 1  - atenolol (TENORMIN) 25 MG tablet; Take 1 tablet (25 mg) by mouth daily  Dispense: 90 tablet; Refill: 1    3. Hyperlipidemia LDL goal <130  Review labs through [place], no further concerns. Will refill at current dose for 6 months.  Contact me sooner with concerns.   - rosuvastatin (CRESTOR) 10 MG tablet; Take 1 tablet (10 mg) by mouth daily  Dispense: 90 tablet; Refill: 1      follow up visit: 6 months    Renee Horta PA-C  [facility]

## 2019-08-22 NOTE — NURSING NOTE
Chief Complaint   Patient presents with     Recheck Medication     review and labs     Pre-visit Screening:  Immunizations:  up to date  Colonoscopy:  is up to date  Mammogram: is up to date  Asthma Action Test/Plan:  NA  PHQ9:  NA  GAD7:  NA  Questioned patient about current smoking habits Pt. has never smoked.  Ok to leave detailed message on voice mail for today's visit only yes, phone # [phone]

## 2019-09-30 ENCOUNTER — OFFICE VISIT (OUTPATIENT)
Dept: FAMILY MEDICINE | Facility: CLINIC | Age: 69
End: 2019-09-30

## 2019-09-30 DIAGNOSIS — Z23 NEED FOR IMMUNIZATION AGAINST INFLUENZA: Primary | ICD-10-CM

## 2019-09-30 PROCEDURE — G0008 ADMIN INFLUENZA VIRUS VAC: HCPCS | Performed by: FAMILY MEDICINE

## 2019-09-30 PROCEDURE — 90686 IIV4 VACC NO PRSV 0.5 ML IM: CPT | Performed by: FAMILY MEDICINE

## 2019-10-01 ENCOUNTER — HEALTH MAINTENANCE LETTER (OUTPATIENT)
Age: 69
End: 2019-10-01

## 2019-10-16 ENCOUNTER — APPOINTMENT (OUTPATIENT)
Dept: ULTRASOUND IMAGING | Facility: CLINIC | Age: 69
End: 2019-10-16
Attending: EMERGENCY MEDICINE
Payer: MEDICARE

## 2019-10-16 ENCOUNTER — HOSPITAL ENCOUNTER (EMERGENCY)
Facility: CLINIC | Age: 69
Discharge: HOME | End: 2019-10-16
Attending: EMERGENCY MEDICINE | Admitting: EMERGENCY MEDICINE
Payer: MEDICARE

## 2019-10-16 VITALS
SYSTOLIC BLOOD PRESSURE: 144 MMHG | DIASTOLIC BLOOD PRESSURE: 85 MMHG | BODY MASS INDEX: 40.77 KG/M2 | WEIGHT: 244.71 LBS | OXYGEN SATURATION: 98 % | TEMPERATURE: 98.4 F | RESPIRATION RATE: 16 BRPM | HEIGHT: 65 IN

## 2019-10-16 DIAGNOSIS — R20.2 PARESTHESIA OF RIGHT LEG: ICD-10-CM

## 2019-10-16 PROCEDURE — 93971 EXTREMITY STUDY: CPT | Mod: RT

## 2019-10-16 PROCEDURE — 99284 EMERGENCY DEPT VISIT MOD MDM: CPT | Mod: 25

## 2019-10-16 ASSESSMENT — ENCOUNTER SYMPTOMS
COLOR CHANGE: 1
WEAKNESS: 0
NUMBNESS: 0
SHORTNESS OF BREATH: 0

## 2019-10-16 ASSESSMENT — MIFFLIN-ST. JEOR: SCORE: 1640.88

## 2019-10-16 NOTE — ED TRIAGE NOTES
"Pt has right leg pain for past 24 hours.  She reports having redness last evening and \"feels like a sunburn\".  "

## 2019-10-16 NOTE — ED PROVIDER NOTES
"  History     Chief Complaint:  Leg Redness    HPI   Maria Victoria Arcos is a 69 year old female with a history of Factor V Leiden, DVT, and varicose veins, but is not anticoagulated, who presents for evaluation of right leg redness and discomfort. Early this morning, the patient woke up to go to the bathroom and noted that her posterior lower thigh and calf was redder than normal and the skin felt \"tight\". When she woke up again this morning, the redness had gone away although the tight feeling and \"burning\" was still there, so she went about her day and did not immediately present. This afternoon, she noted right leg just continued to feel different compared to the left, so she decided to present. Here, she reports an ongoing burning sensation in the leg, but the redness spreads down to her calf. She denies apin. She reports it feels \"sunburned\". She denies any problems with her left leg, nor is she experiencing numbness, weakness, edema, chest pain, or shortness of breath. She does report using heating pads last night, but she used them on both her legs for an equal amount of time. She also denies any new lotions or creams. She denies any recent travel, surgery, or other immobilizations. Five years ago, she had two DVTs in her left upper extremity after a surgery and was on Coumadin for awhile, but has not taken anything of late.     Allergies:  Advicor  Meperidine Hcl  Methocarbamol    Medications:    Acyclovir  Albuterol inhaler  Aspirin 81 mg  Atenolol  Symbicort inhaler  Hydroxyzine   Potassium chloride  Rosuvastatin  Triamterene-hydrochlorothiazide     Past Medical History:    Asthma  DVT - upper extremity x2 (post-surgery)  Hypertension  Osteoarthrosis  Hyperlipidemia  Sleep apnea  Factor V Leiden  GERD  Varicose veins    Past Surgical History:    Appendectomy  Carpal tunnel release  Cholecystectomy  D&C  Eye surgery  Total abdominal hysterectomy  Ventral herniorrhaphy   Tendon sheath incision - right middle " "finger  Left knee replacement  Right thumb replacement  Pubovaginal sling  Left thumb replacement  Urachal mass resection, partial cystectomy, sigmoid colectomy    Family History:    Father: CAD, Diabetes, Hypertension, Cerebrovascular disease, Dementia, Parkinson's  Mother: Diabetes, Hypertension, Cerebrovascular disease, ITP, diverticulitis, A. Fib, Breast cancer  Sister: Hyperlipidemia, Arthritis     Social History:  Marital Status:   [2]  Negative for tobacco use.  Positive for social alcohol use.      Review of Systems   Respiratory: Negative for shortness of breath.    Cardiovascular: Negative for chest pain and leg swelling.   Musculoskeletal:        Right leg burning sensation, no pain   Skin: Positive for color change (right leg redness).   Neurological: Negative for weakness and numbness.   All other systems reviewed and are negative.      Physical Exam     Patient Vitals for the past 24 hrs:   BP Temp Temp src Heart Rate Resp SpO2 Height Weight   10/16/19 1725 (!) 144/85 -- -- -- -- -- -- --   10/16/19 1724 -- 98.4  F (36.9  C) Oral 77 16 98 % 1.651 m (5' 5\") 111 kg (244 lb 11.4 oz)      Physical Exam  General: Adult female sitting upright  CV: Palpable DP and PT pulses right foot and ankle. Regular rate and rhythm  Resp:  Normal respiratory effort.  MSK: Bilateral lower extremity edema. Mild tenderness in the right medial thigh. No palpable cord or mass. No visible asymmetry. Normal active range of motion, ambulatory.  Skin: Warm and dry. Scattered ecchymoses and varicose veins over the bilateral lower extremities.  No visible difference in the appearance of the skin of the bilateral lower extremities.   Neuro: Alert and oriented. Responds appropriately to all questions and commands. No focal findings appreciated. Sensation intact to light touch over all dermatomes of the RLE. Normal muscle tone.  Psych: Normal mood and affect. Pleasant.      Emergency Department Course   Imaging:  Radiographic " findings were communicated with the patient who voiced understanding of the findings.  US Lower Extremity Venous Duplex, right:   No deep venous thrombosis in the right lower extremity, as per radiology.     Emergency Department Course:  Nursing notes and vitals reviewed.   1735: I performed an exam of the patient as documented above.     The patient was sent for a right lower leg ultrasound while in the emergency department, results above.      1852: I rechecked the patient and discussed the results of her workup thus far. She still reports the burning discomfort, but denies pain.     Findings and plan explained to the Patient. Patient discharged home with instructions regarding supportive care, medications, and reasons to return. The importance of close follow-up was reviewed.     I personally reviewed the imaging results with the Patient and answered all related questions prior to discharge.       Impression & Plan      Medical Decision Making:  Maria Victoria Arcos is a 69 year old female who is overweight and with lower extremity edema who presents with a burning sensation to her right leg that was associated with erythema that had since resolved. On my assessment, she has no evidence of cellulitis. She had mild tenderness to the medial thigh and varicosities. With history of Factor V Leiden, DVT was considered a possibility. She had no trauma and therefore bony injury seems less likely. She was also not complaining of pain. She had no fever, systemic symptoms. She is neurovascularly intact. Fortunately, ultrasound did not show any evidence of clot. She understands that if symptoms persist she may benefit from a second ultrasound and/or reassessment for alternative pathology. She should return immediately if she develops fever, pain, spreading redness, or other worsening symptoms. She felt comfortable with plan. Discharged home in stable condition.      Diagnosis:    ICD-10-CM    1. Paresthesia of right leg R20.2         Disposition:  discharged to home    Scribe Disclosure:  I, Rosa Maria Cagle, am serving as a scribe on 10/16/2019 at 5:35 PM to personally document services performed by Karina Moe MD based on my observations and the provider's statements to me.      10/16/2019   [facility] EMERGENCY DEPARTMENT       Karina Moe MD  10/17/19 0034

## 2019-10-16 NOTE — ED AVS SNAPSHOT
[facility] Emergency Department  [address]  [facility] [address]  Phone:  [phone]  Fax:  [phone]                                    Maria Victoria Arcos   MRN: [id number]    Department:  [facility] Emergency Department   Date of Visit:  10/16/2019           After Visit Summary Signature Page    I have received my discharge instructions, and my questions have been answered. I have discussed any challenges I see with this plan with the nurse or doctor.    ..........................................................................................................................................  Patient/Patient Representative Signature      ..........................................................................................................................................  Patient Representative Print Name and Relationship to Patient    ..................................................               ................................................  Date                                   Time    ..........................................................................................................................................  Reviewed by Signature/Title    ...................................................              ..............................................  Date                                               Time          22EPIC Rev 08/18

## 2019-10-30 ENCOUNTER — OFFICE VISIT (OUTPATIENT)
Dept: FAMILY MEDICINE | Facility: CLINIC | Age: 69
End: 2019-10-30

## 2019-10-30 VITALS
DIASTOLIC BLOOD PRESSURE: 78 MMHG | BODY MASS INDEX: 40.6 KG/M2 | WEIGHT: 244 LBS | OXYGEN SATURATION: 98 % | SYSTOLIC BLOOD PRESSURE: 144 MMHG | TEMPERATURE: 98.6 F | HEART RATE: 72 BPM

## 2019-10-30 DIAGNOSIS — J18.9 PNEUMONIA OF RIGHT LOWER LOBE DUE TO INFECTIOUS ORGANISM: Primary | ICD-10-CM

## 2019-10-30 DIAGNOSIS — R05.3 PERSISTENT COUGH FOR 3 WEEKS OR LONGER: ICD-10-CM

## 2019-10-30 PROCEDURE — 99213 OFFICE O/P EST LOW 20 MIN: CPT | Performed by: PHYSICIAN ASSISTANT

## 2019-10-30 PROCEDURE — 71046 X-RAY EXAM CHEST 2 VIEWS: CPT | Performed by: PHYSICIAN ASSISTANT

## 2019-10-30 RX ORDER — IBUPROFEN 200 MG
200 TABLET ORAL EVERY 4 HOURS PRN
COMMUNITY

## 2019-10-30 RX ORDER — LEVOFLOXACIN 500 MG/1
500 TABLET, FILM COATED ORAL DAILY
Qty: 7 TABLET | Refills: 0 | Status: SHIPPED | OUTPATIENT
Start: 2019-10-30 | End: 2020-04-02

## 2019-10-30 RX ORDER — CODEINE PHOSPHATE AND GUAIFENESIN 10; 100 MG/5ML; MG/5ML
1-2 SOLUTION ORAL EVERY 4 HOURS PRN
Qty: 236 ML | Refills: 0 | Status: SHIPPED | OUTPATIENT
Start: 2019-10-30 | End: 2021-01-14

## 2019-10-30 NOTE — PROGRESS NOTES
CC: Cough     History:  Cough started in mid August after cruise to [place]. First was bad sore throat and cough. Felt like it may be as asthma reaction to a cold virus, so took prednisone and z-pack through her pulmonologist Dr. Grimes in early September. This seemed to work for about 1 week afterwards, and then it came back even work. Did wonder about sinus infection as she is getting some nasal congestion at night. Feels like throat feels like there could be mucous.     No history of seasonal allergies. For asthma, takes Symbicort twice daily and albuterol. No reflux type symptoms.     PMH, MEDICATIONS, ALLERGIES, SOCIAL AND FAMILY HISTORY in [facility] and reviewed by me personally.    ROS negative other than the symptoms noted above in the HPI.    Examination   BP (!) 144/78 (BP Location: Left arm, Patient Position: Sitting, Cuff Size: Adult Large)   Pulse 72   Temp 98.6  F (37  C) (Oral)   Wt 110.7 kg (244 lb)   SpO2 98%   BMI 40.60 kg/m       Constitutional: Sitting comfortably, in no acute distress. Vital signs noted  Eyes: pupils equal round reactive to light and accomodation, extra ocular movements intact  Ears: external canals and TMs free of abnormalities  Nose: patent, without mucosal abnormalities  Mouth and throat: without erythema or lesions of the mucosa  Neck:  no adenopathy, trachea midline and normal to palpation  Cardiovascular:  regular rate and rhythm, no murmurs, clicks, or gallops  Respiratory:  normal respiratory rate and rhythm, lungs clear to auscultation  SKIN: No jaundice/pallor/rash.   Psychiatric: mentation appears normal and affect normal/bright        A/P    ICD-10-CM    1. Persistent cough for 3 weeks or longer R05 XR Chest 2 Views   2. Pneumonia of right lower lobe due to infectious organism (H) J18.1 levofloxacin (LEVAQUIN) 500 MG tablet     guaiFENesin-codeine (ROBITUSSIN AC) 100-10 MG/5ML solution       DISCUSSION:  CXR shows RLL pneumonia. Given that this persists despite z-pack,  recommended 7 day course of Levaquin. Warned of side effects including tendon rupture, take with food. I prescribed codeine solution to use for cough at night. Warned of side effects, including drowsiness especially given age >65, no driving while taking, and also warned of addictive potential and to use sparingly.    Recommended repeat chest x-ray in 4-6 weeks- no sooner than 4 weeks. Advised that it would be difficult and too late to complete as part of her 2/2020 medication appt.     follow up visit: 4-6 weeks.     Renee Horta PA-C  [facility]

## 2019-10-30 NOTE — NURSING NOTE
Maria Victoria is here for a cough for 6-8 weeks.          Pre-visit Screening:  Immunizations:  up to date  Colonoscopy:  is up to date  Mammogram: is up to date  Asthma Action Test/Plan:  Yes  PHQ9:  None  GAD7:  None  Questioned patient about current smoking habits Pt. has never smoked.  Ok to leave detailed message on voice mail for today's visit only Yes, phone #

## 2019-10-31 ENCOUNTER — TELEPHONE (OUTPATIENT)
Dept: FAMILY MEDICINE | Facility: CLINIC | Age: 69
End: 2019-10-31

## 2019-10-31 NOTE — TELEPHONE ENCOUNTER
Called and spoke to Maria Victoria, and informed of normal CXR. No need for repeat CXR, but continue Levofloxacin for significant bronchitis. Contact me next week if not significantly better, or sooner if worse. May need additional course of oral prednisone.

## 2019-11-22 ENCOUNTER — ALLIED HEALTH/NURSE VISIT (OUTPATIENT)
Dept: FAMILY MEDICINE | Facility: CLINIC | Age: 69
End: 2019-11-22

## 2019-11-22 DIAGNOSIS — Z23 NEED FOR VACCINATION: Primary | ICD-10-CM

## 2019-11-22 PROCEDURE — 90471 IMMUNIZATION ADMIN: CPT | Performed by: FAMILY MEDICINE

## 2019-11-22 PROCEDURE — 90750 HZV VACC RECOMBINANT IM: CPT | Performed by: FAMILY MEDICINE

## 2019-12-10 DIAGNOSIS — E78.5 HYPERLIPIDEMIA LDL GOAL <130: ICD-10-CM

## 2019-12-10 DIAGNOSIS — R60.1 GENERALIZED EDEMA: ICD-10-CM

## 2019-12-10 DIAGNOSIS — I10 ESSENTIAL HYPERTENSION, BENIGN: ICD-10-CM

## 2019-12-10 RX ORDER — TRIAMTERENE AND HYDROCHLOROTHIAZIDE 75; 50 MG/1; MG/1
1 TABLET ORAL DAILY
Qty: 90 TABLET | Refills: 0 | Status: SHIPPED | OUTPATIENT
Start: 2019-12-10 | End: 2020-04-02

## 2019-12-10 RX ORDER — ROSUVASTATIN CALCIUM 10 MG/1
10 TABLET, COATED ORAL DAILY
Qty: 90 TABLET | Refills: 0 | Status: SHIPPED | OUTPATIENT
Start: 2019-12-10 | End: 2020-02-21

## 2019-12-10 RX ORDER — POTASSIUM CHLORIDE 1500 MG/1
TABLET, EXTENDED RELEASE ORAL
Qty: 90 TABLET | Refills: 0 | Status: SHIPPED | OUTPATIENT
Start: 2019-12-10 | End: 2020-04-02 | Stop reason: DRUGHIGH

## 2019-12-10 RX ORDER — POTASSIUM CHLORIDE 750 MG/1
10 TABLET, EXTENDED RELEASE ORAL DAILY
Qty: 90 TABLET | Refills: 0 | Status: SHIPPED | OUTPATIENT
Start: 2019-12-10 | End: 2020-04-02

## 2019-12-10 RX ORDER — ATENOLOL 25 MG/1
25 TABLET ORAL DAILY
Qty: 90 TABLET | Refills: 0 | Status: SHIPPED | OUTPATIENT
Start: 2019-12-10 | End: 2020-02-21

## 2019-12-10 NOTE — TELEPHONE ENCOUNTER
Maria Victoria Arcos is requesting a refill of:    Pending Prescriptions:                       Disp   Refills    atenolol (TENORMIN) 25 MG tablet          90 tab*0            Sig: Take 1 tablet (25 mg) by mouth daily    potassium chloride ER (K-DUR/KLOR-CON M) *90 tab*0            Sig: Take 1 tablet (10 mEq) by mouth daily    potassium chloride ER (K-DUR/KLOR-CON M) *90 tab*0            Sig: TAKE 1 TABLET(20 MEQ) BY MOUTH DAILY    rosuvastatin (CRESTOR) 10 MG tablet       90 tab*0            Sig: Take 1 tablet (10 mg) by mouth daily    triamterene-HCTZ (MAXZIDE) 75-50 MG [id number] tab*0            Sig: Take 1 tablet by mouth daily    Needs sent to mail order  Mariah Dhaliwal

## 2019-12-15 ENCOUNTER — HEALTH MAINTENANCE LETTER (OUTPATIENT)
Age: 69
End: 2019-12-15

## 2020-01-29 ENCOUNTER — MYC MEDICAL ADVICE (OUTPATIENT)
Dept: FAMILY MEDICINE | Facility: CLINIC | Age: 70
End: 2020-01-29

## 2020-01-30 DIAGNOSIS — E78.5 HYPERLIPIDEMIA LDL GOAL <130: ICD-10-CM

## 2020-01-30 RX ORDER — ROSUVASTATIN CALCIUM 10 MG/1
TABLET, COATED ORAL
Qty: 30 TABLET | COMMUNITY
Start: 2020-01-30

## 2020-01-30 NOTE — TELEPHONE ENCOUNTER
Maria Victoria Arcos is requesting a refill of:    Refused Prescriptions:                       Disp   Refills    rosuvastatin (CRESTOR) 10 MG tablet [Pharm*30 tab*         Sig: TAKE 1 TABLET BY MOUTH DAILY  Refused By: DANELLE PERALTA  Reason for Refusal: Refill not appropriate    Refilled 90 tablets 12/10/19.

## 2020-01-31 ENCOUNTER — TRANSFERRED RECORDS (OUTPATIENT)
Dept: HEALTH INFORMATION MANAGEMENT | Facility: CLINIC | Age: 70
End: 2020-01-31

## 2020-02-05 NOTE — TELEPHONE ENCOUNTER
Spoke to Maria Victoria and she has enough medication for 90 days. She will then make an appointment to see LOLLY.  Rosario keeps sending a request for 10 MEQ's of potassium.  This should not be filled until Maria Victoria is seen she does not need it.  At the time of her visit, however, [facility] will need to send in a prescription for 10 meq's to take 30 daily.  It is cheaper then getting a 20 and a 10 anthony tablet.  Noted for future reference today.

## 2020-02-06 ENCOUNTER — TRANSFERRED RECORDS (OUTPATIENT)
Dept: FAMILY MEDICINE | Facility: CLINIC | Age: 70
End: 2020-02-06

## 2020-02-08 DIAGNOSIS — E66.01 MORBID OBESITY DUE TO EXCESS CALORIES (H): ICD-10-CM

## 2020-02-08 DIAGNOSIS — B35.4 TINEA CORPORIS: ICD-10-CM

## 2020-02-10 NOTE — TELEPHONE ENCOUNTER
Pending Prescriptions:                       Disp   Refills    ketoconazole (NIZORAL) 2 % external cream*                    Sig: APPLY EXTERNALLY TO THE AFFECTED AREA DAILY AS           NEEDED    Last refill was 1-2019 a year ago  Fax or deny  No Future Appt's for pt    Ngozi

## 2020-02-11 RX ORDER — KETOCONAZOLE 20 MG/G
CREAM TOPICAL
OUTPATIENT
Start: 2020-02-11

## 2020-02-11 NOTE — TELEPHONE ENCOUNTER
Med denied  Pt due for fasting OV  Please call her to schedule    Jacqui Landers PA-C  2/11/2020

## 2020-02-21 DIAGNOSIS — E78.5 HYPERLIPIDEMIA LDL GOAL <130: ICD-10-CM

## 2020-02-21 DIAGNOSIS — I10 ESSENTIAL HYPERTENSION, BENIGN: ICD-10-CM

## 2020-02-21 RX ORDER — ROSUVASTATIN CALCIUM 10 MG/1
10 TABLET, COATED ORAL DAILY
Qty: 35 TABLET | COMMUNITY
Start: 2020-02-21 | End: 2020-04-02

## 2020-02-21 RX ORDER — ATENOLOL 25 MG/1
25 TABLET ORAL DAILY
Qty: 35 TABLET | Refills: 0 | COMMUNITY
Start: 2020-02-21 | End: 2020-04-02

## 2020-02-21 NOTE — TELEPHONE ENCOUNTER
Called atenolol and rosuvastatin #35 only to [facility]'s pharmacy to get patient through until her appt time on March 24, 2020.

## 2020-02-26 ENCOUNTER — MYC REFILL (OUTPATIENT)
Dept: FAMILY MEDICINE | Facility: CLINIC | Age: 70
End: 2020-02-26

## 2020-02-26 DIAGNOSIS — B35.4 TINEA CORPORIS: ICD-10-CM

## 2020-02-26 DIAGNOSIS — E66.01 MORBID OBESITY DUE TO EXCESS CALORIES (H): ICD-10-CM

## 2020-02-26 RX ORDER — KETOCONAZOLE 20 MG/G
CREAM TOPICAL
Qty: 30 G | Refills: 0 | Status: SHIPPED | OUTPATIENT
Start: 2020-02-26 | End: 2020-06-30

## 2020-02-26 NOTE — TELEPHONE ENCOUNTER
Please see pt's MyChart message. Pt has appt scheduled for 03/24/20.    Maria Victoria Arcos is requesting a refill of:    Pending Prescriptions:                       Disp   Refills    ketoconazole (NIZORAL) 2 % external cream 30 g   prn          Sig: Apply  topically daily as needed.

## 2020-03-22 ENCOUNTER — TELEPHONE (OUTPATIENT)
Dept: FAMILY MEDICINE | Facility: CLINIC | Age: 70
End: 2020-03-22

## 2020-03-22 NOTE — TELEPHONE ENCOUNTER
Telephone call  afternoon    Has not felt well for the past week  Low grade fever: 100.4 today  Cough-  History of asthma, under care of Dr Grimes- self dosed rescue medication of azithromycin ad prednisone (one more day left)  She has a daily inhaler- as well as rescue that she has been using every 3 hours  Her mother  this past week after fracturing her hip- she is not sure how much stress is contributing to her symptoms    Able to sleep (up once a night to use inhaler)  She called for advise on what she should do    1) Call [place] [facility] HOTLINE: ? Availability of COVID19 testing  2) check in with Dr Grimes's office: any other management advise  3) she is scheduled to see Jacqui on Tuesday- should this be a virtual visit or see in office?    Jacqui-forwarding phone encounter to you

## 2020-03-23 ENCOUNTER — TELEPHONE (OUTPATIENT)
Dept: FAMILY MEDICINE | Facility: CLINIC | Age: 70
End: 2020-03-23

## 2020-03-23 ENCOUNTER — APPOINTMENT (OUTPATIENT)
Dept: GENERAL RADIOLOGY | Facility: CLINIC | Age: 70
End: 2020-03-23
Attending: EMERGENCY MEDICINE
Payer: MEDICARE

## 2020-03-23 ENCOUNTER — HOSPITAL ENCOUNTER (EMERGENCY)
Facility: CLINIC | Age: 70
Discharge: HOME | End: 2020-03-23
Attending: EMERGENCY MEDICINE | Admitting: EMERGENCY MEDICINE
Payer: MEDICARE

## 2020-03-23 VITALS
RESPIRATION RATE: 20 BRPM | TEMPERATURE: 99.3 F | WEIGHT: 242.95 LBS | SYSTOLIC BLOOD PRESSURE: 141 MMHG | DIASTOLIC BLOOD PRESSURE: 83 MMHG | HEART RATE: 88 BPM | OXYGEN SATURATION: 96 % | BODY MASS INDEX: 40.43 KG/M2

## 2020-03-23 DIAGNOSIS — J18.9 PNEUMONIA OF RIGHT MIDDLE LOBE DUE TO INFECTIOUS ORGANISM: ICD-10-CM

## 2020-03-23 DIAGNOSIS — Z20.822 SUSPECTED COVID-19 VIRUS INFECTION: ICD-10-CM

## 2020-03-23 DIAGNOSIS — J45.41 MODERATE PERSISTENT ASTHMA WITH EXACERBATION: ICD-10-CM

## 2020-03-23 PROCEDURE — 99283 EMERGENCY DEPT VISIT LOW MDM: CPT | Mod: 25

## 2020-03-23 PROCEDURE — 71045 X-RAY EXAM CHEST 1 VIEW: CPT

## 2020-03-23 RX ORDER — PREDNISONE 10 MG/1
TABLET ORAL
Qty: 32 TABLET | Refills: 0 | Status: SHIPPED | OUTPATIENT
Start: 2020-03-23 | End: 2020-07-29

## 2020-03-23 RX ORDER — ALBUTEROL SULFATE 90 UG/1
2 AEROSOL, METERED RESPIRATORY (INHALATION)
Qty: 1 INHALER | Refills: 3 | Status: SHIPPED | OUTPATIENT
Start: 2020-03-23

## 2020-03-23 RX ORDER — DOXYCYCLINE 100 MG/1
100 CAPSULE ORAL 2 TIMES DAILY
Qty: 14 CAPSULE | Refills: 0 | Status: SHIPPED | OUTPATIENT
Start: 2020-03-23 | End: 2020-04-02

## 2020-03-23 ASSESSMENT — ENCOUNTER SYMPTOMS
COUGH: 1
FEVER: 1
SHORTNESS OF BREATH: 1

## 2020-03-23 NOTE — TELEPHONE ENCOUNTER
Called and spoke to Maria Victoria. Her symptoms have not changed since yesterday. Still not feeling well, very weak, low grade fever (as recently as last night, not this morning), cough, SOB, and finished z-pack last week and 12 days prednisone today. She explains that this always tends to fix her COPD exacerbations, so she is wondering why this isn't working.  Has been using Symbicort as usual twice daily, and Ventolin consistently which is unusual for her.     She estimates symptoms started 7 days. She did spend 1 week in nursing home in [place], while mom on hospice about 1 week ago, but does not live in nursing home. Son recently traveled to [place].     Explained to Maria Victoria that I do feel she needs to be evaluated if symptoms aren't improving. Our clinic does not offer Covid 19 testing, and would like her to be evaluated for this.    Called MD, who was not able to give any specific places that can check. Called [facility] ER who confirmed they are testing pts that need to be admitted to hospital and health care workers.     Spoke to Maria Victoria and advised she continue to monitor- I would not be comfortable with her coming into our clinic, especially since we do not offer the testing. Advised ER if worsening, otherwise, self-isolation until resolved. Maria Victoria understands and agrees to this.     She will be due for med refill soon, but will wait to determine best way to do this until cough is evaluated.

## 2020-03-23 NOTE — ED AVS SNAPSHOT
[facility] Emergency Department  [address]  [facility] [address]  Phone:  [phone]  Fax:  [phone]                                    Maria Victoria Arcos   MRN: [id number]    Department:  [facility] Emergency Department   Date of Visit:  3/23/2020           After Visit Summary Signature Page    I have received my discharge instructions, and my questions have been answered. I have discussed any challenges I see with this plan with the nurse or doctor.    ..........................................................................................................................................  Patient/Patient Representative Signature      ..........................................................................................................................................  Patient Representative Print Name and Relationship to Patient    ..................................................               ................................................  Date                                   Time    ..........................................................................................................................................  Reviewed by Signature/Title    ...................................................              ..............................................  Date                                               Time          22EPIC Rev 08/18

## 2020-03-23 NOTE — ED PROVIDER NOTES
History     Chief Complaint:  Cough, fever, and shortness of breath      HPI   Maria Victoria Arcos is a 69 year old female with a history of hypertension, hyperlipidemia, asthma, Factor V Leiden carrier, GERD and asthma who presents with one week of cough, fever, and shortness of breath. Was on azithromycin last week as has a standing order for this from pulmonary. URI symptoms continue and cough does as well. Using albuterol inh and just finished prednisone burst now worsee again. No known COVID contacts.  No fevers.     Allergies:  Advicor  Meperidine Hcl  Methocarbamol   Hylan G-F 20     Medications:    Albuterol inhaler  Baby aspirin  Atenolol  Symbicort  Hydroxyzine  Potassium chloride  Rosuvastatin  Maxzide  Valtrex    Past Medical History:    DVT  Hypertension  Asthma  Osteoarthrosis  Hyperlipidemia  ISABELL  Factor V Leiden carrier  Third cranial third nerve paresis  GERD  Hypertension     Past Surgical History:    Appendectomy  Bilateral carpal tunnel release  Cholecystectomy  D & C  Ventral herniorrhaphy  SMITA, oophorectomy unilateral  Right middle finger surgery  Pubovaginal sling  Left knee replacement  Right thumb joint replacement  Left thumb joint replacement  Resection of urachal mass, partial cystectomy sigmoid colectomy  Bladder mass excision  Moh's surgery forehead    Family History:    CAD  Diabetes   Hypertension  Cerebrovascular disease  Dementia  Parkinson's   ITP  Diverticulitis  PAF   Breast cancer   Lipids  Arthritis    Social History:  Smoking status: Never  Alcohol use: Yes  Drug use: No  Patient presents alone  PCP: Lissa Brasher    Marital Status:       Review of Systems   Constitutional: Positive for fever.   Respiratory: Positive for cough and shortness of breath.    All other systems reviewed and are negative.      Physical Exam     Patient Vitals for the past 24 hrs:   BP Temp Temp src Pulse Resp SpO2 Weight   03/23/20 1747 (!) 170/88 99.3  F (37.4  C) Oral 88 20 94 % 110.2 kg  (242 lb 15.2 oz)     Physical Exam  General: Patient is alert and interactive when I enter the room  Head:  The scalp, face, and head appear normal  Eyes:  The pupils are equal, round, and reactive to light    Conjunctivae and sclerae are normal  ENT:    External acoustic canals are normal    The oropharynx is normal without erythema.     Uvula is in the midline  Neck:  Normal range of motion  CV:  Regular rate. S1/S2. No murmurs.   Resp:  Lungs are coarse with fine wheezes. No distress  GI:  Abdomen is soft, no rigidity, guarding, or rebound    No distension. No tenderness to palpation in any quadrant.     MS:  Normal tone. Joints grossly normal without effusions.     No asymmetric leg swelling, calf or thigh tenderness.      Normal motor assessment of all extremities.  Skin:  No rash or lesions noted. Normal capillary refill noted  Neuro:  Speech is normal and fluent. Face is symmetric.     Moving all extremities well.   Psych:  Awake. Alert.  Normal affect.  Appropriate interactions.  Lymph: No anterior cervical lymphadenopathy noted        Emergency Department Course     Imaging:  Radiology findings were communicated with the patient who voiced understanding of the findings.    X-ray Chest Portable, 1 views:    XR Chest Port 1 View   Final Result   IMPRESSION: Subtle nodular opacities are seen in the left upper lung laterally which may represent an infectious or inflammatory process. Possible additional subtle nodular densities in the right midlung laterally, less conspicuous. No pleural effusion    or pneumothorax. Normal heart size and mediastinal contour. Results discussed with Dr. Mon.            XR Chest Port 1 View    (Results Pending)     Laboratory:  Laboratory findings were communicated with the patient who voiced understanding of the findings.    Labs Ordered and Resulted from Time of ED Arrival Up to the Time of Departure from the ED - No data to  display  Procedures:      Interventions:    Medications - No data to display     Emergency Department Course:   Past medical records, nursing notes, and vitals reviewed.  1800: I performed an exam of the patient and obtained history, as documented above.    Portable chest x-ray was taken while in the emergency department, findings above.     0730pm: Findings and plan explained to the Patient. Patient discharged home with instructions regarding supportive care, medications, and reasons to return. The importance of close follow-up was reviewed. The patient was prescribed meds as below.     I personally reviewed the imaging results with the Patient and answered all related questions prior to discharge.     Impression & Plan     Medical Decision Making:  Maria Victoria Arcos is a 69 year old female with a PMH of asthma who presents for evaluation of shortness of breath and wheezing.  Signs and symptoms are consistent with asthma exacerbation.  A broad differential was considered including foreign body, asthma, pneumonia, bronchitis, reactive airway disease, pneumothorax, cardiac equivalent, viral induced wheezing, pmallergic phenomena, etc.  There are no signs at this point of any serious etiologies including those mentioned above. Chest x-ray shows infiltrates and will treat for pneumonia. No indication for hospitalization at this time including no hypoxia, no marked increase in respiratory rate, minimal to no retractions.   Supportive outpatient management is indicated, medications for discharge noted above.  Close followup with primary care physician.  The patient understands that her symptoms may also be suggestive of possible COVID-19 illness. Given COVID-19 testing is only recommended for inpatients and/or respiratory failure, we discussed using drive up testing if this becomes available again this week or next. They understand this may rapidly change and to return here if they develop fevers more than 102 or  progressive respiratory distress.     Diagnosis:    ICD-10-CM    1. Moderate persistent asthma with exacerbation  J45.41    2. Suspected Covid-19 Virus Infection  R68.89    3. Pneumonia of right middle lobe due to infectious organism (H) and left upper lung J18.1      Disposition:  Discharged to home.    Discharge Medications:  New Prescriptions     New Prescriptions    ALBUTEROL (PROAIR HFA/PROVENTIL HFA/VENTOLIN HFA) 108 (90 BASE) MCG/ACT INHALER    Inhale 2 puffs into the lungs every 2 hours as needed for shortness of breath / dyspnea    AMOXICILLIN-CLAVULANATE (AUGMENTIN) 875-125 MG TABLET    Take 1 tablet by mouth 2 times daily for 7 days    DOXYCYCLINE HYCLATE (VIBRAMYCIN) 100 MG CAPSULE    Take 1 capsule (100 mg) by mouth 2 times daily for 7 days    PREDNISONE (DELTASONE) 10 MG TABLET    Take 4 tablets daily for 5 days,  take 2 tablets daily for 3 days, take 1 tablet daily for 3 days, take half a tablet for 3 days.       Scribe Disclosure:  IRebel Chi, am serving as a scribe at 5:39 PM on 3/23/2020 to document services personally performed by Gonzales Mon MD based on my observations and the provider's statements to me.      Rebel Zamorano   3/23/2020   [facility] EMERGENCY DEPARTMENT     Gonzales Mon MD  03/23/20 1949

## 2020-03-23 NOTE — ED TRIAGE NOTES
Arrives with cough, fever, and SOB for one week. History of asthma, alert and oriented, ABCs intact at this time.

## 2020-03-24 NOTE — DISCHARGE INSTRUCTIONS
You may have a case of the coronovirus or another virus. You are certainly still having asthma symptoms and we will do a prednisone taper. We are not testing people who are well enough to be managed at home. If this changes and we are beginning testing (drive-up testing) then I would register and get tested as I have a high suspicion you have the Covid-19 disease.  Continue to isolate yourself at home for one more week or until a Covid test returns negative. Return to the emergency room for severe shortness of breath, fevers more than 102. Thank you for choosing [facility].

## 2020-04-02 ENCOUNTER — OFFICE VISIT (OUTPATIENT)
Dept: FAMILY MEDICINE | Facility: CLINIC | Age: 70
End: 2020-04-02

## 2020-04-02 VITALS
HEART RATE: 67 BPM | OXYGEN SATURATION: 98 % | TEMPERATURE: 98.2 F | RESPIRATION RATE: 16 BRPM | SYSTOLIC BLOOD PRESSURE: 136 MMHG | BODY MASS INDEX: 40.15 KG/M2 | HEIGHT: 64 IN | DIASTOLIC BLOOD PRESSURE: 82 MMHG | WEIGHT: 235.2 LBS

## 2020-04-02 DIAGNOSIS — J18.9 PNEUMONIA OF RIGHT LOWER LOBE DUE TO INFECTIOUS ORGANISM: Primary | ICD-10-CM

## 2020-04-02 DIAGNOSIS — R60.1 GENERALIZED EDEMA: ICD-10-CM

## 2020-04-02 DIAGNOSIS — Z79.899 ENCOUNTER FOR LONG-TERM (CURRENT) USE OF MEDICATIONS: ICD-10-CM

## 2020-04-02 DIAGNOSIS — G47.33 OBSTRUCTIVE SLEEP APNEA SYNDROME: ICD-10-CM

## 2020-04-02 DIAGNOSIS — J45.901 MODERATE ASTHMA WITH ACUTE EXACERBATION, UNSPECIFIED WHETHER PERSISTENT: ICD-10-CM

## 2020-04-02 DIAGNOSIS — E87.6 CHRONICALLY LOW SERUM POTASSIUM: ICD-10-CM

## 2020-04-02 DIAGNOSIS — I10 ESSENTIAL HYPERTENSION, BENIGN: ICD-10-CM

## 2020-04-02 DIAGNOSIS — E78.5 HYPERLIPIDEMIA LDL GOAL <130: ICD-10-CM

## 2020-04-02 DIAGNOSIS — E66.01 MORBID OBESITY DUE TO EXCESS CALORIES (H): ICD-10-CM

## 2020-04-02 DIAGNOSIS — K21.9 GASTROESOPHAGEAL REFLUX DISEASE WITHOUT ESOPHAGITIS: ICD-10-CM

## 2020-04-02 PROBLEM — G25.0 ESSENTIAL TREMOR: Status: ACTIVE | Noted: 2018-08-07

## 2020-04-02 LAB
% GRANULOCYTES: 62.2 %
ALBUMIN SERPL-MCNC: 4.4 G/DL (ref 3.6–5.1)
ALBUMIN/GLOB SERPL: 1.7 {RATIO} (ref 1–2.5)
ALP SERPL-CCNC: 44 U/L (ref 33–130)
ALT 1742-6: 5 U/L (ref 0–32)
AST 1920-8: 7 U/L (ref 0–35)
BILIRUB SERPL-MCNC: 0.8 MG/DL (ref 0.2–1.2)
BUN SERPL-MCNC: 22 MG/DL (ref 7–25)
BUN/CREATININE RATIO: 25 (ref 6–22)
CALCIUM SERPL-MCNC: 10.1 MG/DL (ref 8.6–10.3)
CHLORIDE SERPLBLD-SCNC: 97.2 MMOL/L (ref 98–110)
CHOLEST SERPL-MCNC: 157 MG/DL (ref 0–199)
CHOLEST/HDLC SERPL: 2 {RATIO} (ref 0–5)
CO2 SERPL-SCNC: 30.1 MMOL/L (ref 20–32)
CREAT SERPL-MCNC: 0.88 MG/DL (ref 0.7–1.18)
GLOBULIN, CALCULATED - QUEST: 2.6 (ref 1.9–3.7)
GLUCOSE SERPL-MCNC: 81 MG/DL (ref 60–99)
HCT VFR BLD AUTO: 42.9 % (ref 35–47)
HDLC SERPL-MCNC: 64 MG/DL (ref 40–150)
HEMOGLOBIN: 14.3 G/DL (ref 11.7–15.7)
LDLC SERPL CALC-MCNC: 60 MG/DL (ref 0–130)
LYMPHOCYTES NFR BLD AUTO: 30.4 %
MCH RBC QN AUTO: 28.8 PG (ref 26–33)
MCHC RBC AUTO-ENTMCNC: 33.3 G/DL (ref 31–36)
MCV RBC AUTO: 86.3 FL (ref 78–100)
MONOCYTES NFR BLD AUTO: 7.4 %
PLATELET COUNT - QUEST: 311 10^9/L (ref 150–375)
POTASSIUM SERPL-SCNC: 3.81 MMOL/L (ref 3.5–5.3)
PROT SERPL-MCNC: 7 G/DL (ref 6.1–8.1)
RBC # BLD AUTO: 4.97 10*12/L (ref 3.8–5.2)
SODIUM SERPL-SCNC: 136.6 MMOL/L (ref 135–146)
TRIGL SERPL-MCNC: 166 MG/DL (ref 0–149)
WBC # BLD AUTO: 8.4 10*9/L (ref 4–11)

## 2020-04-02 PROCEDURE — 80053 COMPREHEN METABOLIC PANEL: CPT | Performed by: FAMILY MEDICINE

## 2020-04-02 PROCEDURE — 85025 COMPLETE CBC W/AUTO DIFF WBC: CPT | Performed by: FAMILY MEDICINE

## 2020-04-02 PROCEDURE — 36415 COLL VENOUS BLD VENIPUNCTURE: CPT | Performed by: FAMILY MEDICINE

## 2020-04-02 PROCEDURE — 80061 LIPID PANEL: CPT | Performed by: FAMILY MEDICINE

## 2020-04-02 PROCEDURE — 99214 OFFICE O/P EST MOD 30 MIN: CPT | Performed by: FAMILY MEDICINE

## 2020-04-02 RX ORDER — TRIAMTERENE AND HYDROCHLOROTHIAZIDE 75; 50 MG/1; MG/1
1 TABLET ORAL DAILY
Qty: 90 TABLET | Refills: 1 | Status: SHIPPED | OUTPATIENT
Start: 2020-04-02 | End: 2020-11-05

## 2020-04-02 RX ORDER — POTASSIUM CHLORIDE 1500 MG/1
20 TABLET, EXTENDED RELEASE ORAL DAILY
Qty: 90 TABLET | Refills: 1 | Status: SHIPPED | OUTPATIENT
Start: 2020-04-02 | End: 2020-11-05

## 2020-04-02 RX ORDER — POTASSIUM CHLORIDE 1500 MG/1
TABLET, EXTENDED RELEASE ORAL
COMMUNITY
Start: 2020-01-13 | End: 2020-04-02

## 2020-04-02 RX ORDER — ATENOLOL 25 MG/1
25 TABLET ORAL DAILY
Qty: 90 TABLET | Refills: 1 | Status: SHIPPED | OUTPATIENT
Start: 2020-04-02 | End: 2020-11-06

## 2020-04-02 RX ORDER — ROSUVASTATIN CALCIUM 10 MG/1
10 TABLET, COATED ORAL DAILY
Qty: 90 TABLET | Refills: 1 | Status: SHIPPED | OUTPATIENT
Start: 2020-04-02 | End: 2020-11-05

## 2020-04-02 ASSESSMENT — MIFFLIN-ST. JEOR: SCORE: 1577.89

## 2020-04-02 NOTE — PROGRESS NOTES
Subjective  Multiple issues/ multiple notes    1. ER followup pneumonia/ asthma  2. Hypertension/edema with chroni low potassium  3. Hyperlipidemia      Maria Victoria Arcos is a 69 year old female who presents to clinic today for the following health issues:    HPI  Long standing asthma on Symbicort with prn albuterol. Last 2/6//2020 by pulmonary specialists.  Had a flare late February and followed protocol of prednisone and a z-pack. Not totally improved and monitored her symptoms. 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 Stable with prn albuterol.  URI symptoms started in 3/21/2020 and got so sick on 3/23/2020 wnet to ER.  Evalution with CXR with right mid lung nodular densities/ wonders about Covid with nursing himtadeo expapril. hysical exam was normal.    Started feeling better the last 2-3 days, on CPAP and on prednisone on 1 mgm dose. Finish antibiotics 3 days ago. Mild cough with clear nasal drainage  Using albuterol and no real change/ so stopping.      ED/UC Followup:    Facility:  [facility]  Date of visit: 03/23/20  Reason for visit: cough, SOB, feeling very weak   Current Status: feeling better today, SOB with exertion       Moderate asthma followed by Dr Grimes/ Symbicort daily    Patient Active Problem List   Diagnosis     Generalized osteoarthrosis, unspecified site     Herpes simplex virus (HSV) infection     Sleep apnea     Impaired fasting glucose     Factor V Leiden mutation (H)     Health Care Home     HCD (health care directive)     Hyperlipidemia LDL goal <130     Rosacea     Basal cell carcinoma of face     Ventral hernia     ACP (advance care planning)     Morbid obesity due to excess calories (H)     Cranial third nerve paresis, left/ diplopia     Gastroesophageal reflux disease without esophagitis     Essential hypertension, benign     Class 3 obesity without serious comorbidity with body mass index (BMI) of 40.0 to 44.9 in adult, unspecified obesity type     Essential  tremor     Moderate asthma with acute exacerbation, unspecified whether persistent     Chronically low serum potassium     Generalized edema     Past Surgical History:   Procedure Laterality Date     APPENDECTOMY       CARPAL TUNNEL RELEASE RT/LT       CHOLECYSTECTOMY, LAPOROSCOPIC      Cholecystectomy, Laparoscopic     COLONOSCOPY  9/04    diverticulosis     D & C       EYE SURGERY       HERNIORRHAPHY VENTRAL N/A 11/15/2015    Procedure: HERNIORRHAPHY VENTRAL;  Surgeon: Rell Green MD;  Location: [facility] OR     HYSTERECTOMY, SMITA      one ovary      INCISE FINGER TENDON SHEATH      right middle finger     SURGICAL HISTORY OF -   6/08    pubovaginal sling     SURGICAL HISTORY OF -       left knee replacement     SURGICAL HISTORY OF -   2006    right thumb joint replacement     SURGICAL HISTORY OF -   1/09    left thumb joint replacement     SURGICAL HISTORY OF -   11/4/13    resection of urachal mass, parital cystectomy sigmoid colectomy. 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 Father         under age 55     Diabetes Father      Hypertension Father      Cerebrovascular Disease Father      Neurologic Disorder Father         dementia; Parkinson's     Obesity Father      Diabetes Mother      Hypertension Mother      Cerebrovascular Disease Mother      Circulatory Mother         ITP     Gastrointestinal Disease Mother         severe diverticulitis     Obesity Mother      Heart Disease Mother         paroxysmal a fib     Arthritis Mother      Allergies Mother      Blood Disease Mother      Breast Cancer  Mother         age 89     Lipids Sister      Obesity Sister      Arthritis Sister      Breast Cancer Maternal Aunt      Breast Cancer Maternal Aunt          Current Outpatient Medications   Medication Sig Dispense Refill     albuterol (PROAIR HFA/PROVENTIL HFA/VENTOLIN HFA) 108 (90 Base) MCG/ACT inhaler Inhale 2 puffs into the lungs every 2 hours as needed for shortness of breath / dyspnea 1 Inhaler 3     aspirin 81 MG tablet Take 1 tablet by mouth daily. 100 tablet 3     atenolol (TENORMIN) 25 MG tablet Take 1 tablet (25 mg) by mouth daily 90 tablet 1     budesonide-formoterol (SYMBICORT) 80-4.5 MCG/ACT inhaler 1 puff 2 times daily. Pulmonology is filling this 1 Inhaler      COMPOUNDED NON-CONTROLLED SUBSTANCE (CMPD RX) - PHARMACY TO MIX COMPOUNDED MEDICATION Estradiol 0.02% cream (contains 0.2mg estradiol/gm of cream)- apply 1 gram daily to the vaginal area for one week then 1g 2-3 times weekly 30 g 11     guaiFENesin-codeine (ROBITUSSIN AC) 100-10 MG/5ML solution Take 5-10 mLs by mouth every 4 hours as needed for cough 236 mL 0     hydrOXYzine (VISTARIL) 25 MG capsule Take 25 mg by mouth       ibuprofen (ADVIL/MOTRIN) 200 MG tablet Take 200 mg by mouth every 4 hours as needed for mild pain       ketoconazole (NIZORAL) 2 % external cream Apply  topically daily as needed. 30 g 0     potassium chloride ER (KLOR-CON M) 20 MEQ CR tablet Take 1 tablet (20 mEq) by mouth daily 90 tablet 1     predniSONE (DELTASONE) 10 MG tablet Take 4 tablets daily for 5 days,  take 2 tablets daily for 3 days, take 1 tablet daily for 3 days, take half a tablet for 3 days. 32 tablet 0     PREMARIN cream Place 1 g vaginally twice a week 30 g 4     rosuvastatin (CRESTOR) 10 MG tablet Take 1 tablet (10 mg) by mouth daily 90 tablet 1     triamterene-HCTZ (MAXZIDE) 75-50 MG tablet Take 1 tablet by mouth daily 90 tablet 1     TYLENOL 325 MG OR TABS ONE TO TWO TABLETS EVERY 4 TO 6 HOURS AS NEEDED FOR PAIN 100 0     valACYclovir (VALTREX) 1000 mg  "tablet TAKE 2 TABLETS BY MOUTH TWICE DAILY 12 tablet 1     Allergies   Allergen Reactions     Advicor Hives     Is a combination medication of niacin and lovastatin     Meperidine Hcl Hives     Methocarbamol Hives     Neomycin-Polymyxin-Dexameth Other (See Comments)     BP Readings from Last 3 Encounters:   04/02/20 136/82   03/23/20 (!) 141/83   10/30/19 (!) 144/78    Wt Readings from Last 3 Encounters:   04/02/20 106.7 kg (235 lb 3.2 oz)   03/23/20 110.2 kg (242 lb 15.2 oz)   10/30/19 110.7 kg (244 lb)            Reviewed and updated as needed this visit by Provider         Review of Systems   ROS COMP: Constitutional, HEENT, cardiovascular, pulmonary, gi and gu systems are negative, except as otherwise noted.      Objective    /82 (BP Location: Left arm, Patient Position: Sitting, Cuff Size: Adult Large)   Pulse 67   Temp 98.2  F (36.8  C) (Oral)   Resp 16   Ht 1.619 m (5' 3.75\")   Wt 106.7 kg (235 lb 3.2 oz)   SpO2 98%   BMI 40.69 kg/m    Body mass index is 40.69 kg/m .  Physical Exam   GENERAL: healthy, alert and no distress  ENT; External ears  and canals clear bilaterally. TM's normal bilaterally. Nose normal without lesions. Clear nasal discharge. Oropharynx normal. Neck supple without palpable adenopathy.    NECK: no adenopathy, no asymmetry, masses, or scars and thyroid normal to palpation  RESP: lungs clear to auscultation - no rales, rhonchi or wheezes  RESP: dry cough  CV: regular rate and rhythm, normal S1 S2, no S3 or S4, no murmur, click or rub, no peripheral edema and peripheral pulses strong  ABDOMEN: soft, nontender, no hepatosplenomegaly, no masses and bowel sounds normal  MS: no gross musculoskeletal defects noted, no edema  SKIN: no suspicious lesions or rashes  NEURO: Normal strength and tone, mentation intact and speech normal    Diagnostic Test Results:  Labs reviewed in Epic  CBC - WNL        Assessment & Plan     (J18.1) Pneumonia of right lower lobe due to infectious organism " (H)  (primary encounter diagnosis)  Comment: finish out prednsione/ continue astham routine  Plan: CL AFF HEMOGRAM/PLATE/DIFF (BFP), VENOUS         COLLECTION        Symptomatic care with decongestants, fluids, tylenol/advil prn. Use GUAIFENESIN  MG OR TBCR, 1 tab po BID (Twice per day), D: 20, R: 0 for congestion and cough.  Cough drops  In addition, I have suggested that the patient   monitor for symptoms of bacterial infection expecting slow gradual resolution of viral URI as the natural course.      (J45.901) Moderate asthma with acute exacerbation, unspecified whether persistent  Comment: stop albuterol except bedtime use  Plan: CL AFF HEMOGRAM/PLATE/DIFF (BFP), VENOUS         COLLECTION        Monitor    (E78.5) Hyperlipidemia LDL goal <130  Plan: Lipid Panel (BFP), VENOUS COLLECTION,         rosuvastatin (CRESTOR) 10 MG tablet        1)  Medication: continue current medication regimen unchanged awaitign labs  2)  Low fat, low cholesterol diet  3)  Regular aerobic exercise  4)  Recheck in 6 months, sooner should new symptoms or   problems arise.    Patient Education: Reviewed risks of elevated lipids and principles   of treatment.        (I10) Essential hypertension, benign  Plan: Comprehensive Metobolic Panel (BFP), VENOUS         COLLECTION, atenolol (TENORMIN) 25 MG tablet,         triamterene-HCTZ (MAXZIDE) 75-50 MG tablet,         potassium chloride ER (KLOR-CON M) 20 MEQ CR         tablet        1)  Medication: continue current medication regimen unchanged awaiting lasb  2)  Dietary sodium restriction  3)  Regular aerobic exercise  4)  Recheck in 6 months, sooner should new symptoms or   problems arise.    Patient Education: Reviewed risks of hypertension and principles of   treatment.        (E87.6) Chronically low serum potassium  Plan: potassium chloride ER (KLOR-CON M) 20 MEQ CR         tablet        I have reviewed the patient's medical history in detail and updated the computerized patient  "record.      (R60.1) Generalized edema  Plan: triamterene-HCTZ (MAXZIDE) 75-50 MG tablet        I have reviewed the patient's medical history in detail and updated the computerized patient record.      (G47.33) Obstructive sleep apnea syndrome  Plan: I have reviewed the patient's medical history in detail and updated the computerized patient record.      (K21.9) Gastroesophageal reflux disease without esophagitis  Plan: resolved    (E66.01) Morbid obesity due to excess calories (H)  Plan: A low fat diet, regular aerobic exercise like walking 30 minutes daily and weight control is the treatment recommendations at this time      (Z79.899) Encounter for long-term (current) use of medications  Plan: CL AFF HEMOGRAM/PLATE/DIFF (BFP), Lipid Panel         (BFP), Comprehensive Metobolic Panel (BFP),         VENOUS COLLECTION               BMI:   Estimated body mass index is 40.69 kg/m  as calculated from the following:    Height as of this encounter: 1.619 m (5' 3.75\").    Weight as of this encounter: 106.7 kg (235 lb 3.2 oz).   Weight management plan: Discussed healthy diet and exercise guidelines        CONSULTATION/REFERRAL to pulmonary specislsists  3 weeks CXR    No follow-ups on file.    Lissa Brasher MD  [facility]          2.   SUBJECTIVE:  Maria Victoria Arcos is an 69 year old female who presents for evaluation of   Hyperlipidemia and  hypertension. She has been diagnosed in the past as having   combined hyperlipidemia. She indicates that she is feeling well   and denies any symptoms of cardiovascular disease. Specifically   denies chest pain, palpitations, dyspnea, orthopnea, PND,   claudication or peripheral edema. Treatment modalities employed to   this point include diet, regular aerobic exercise and Crestor. Current medication   regimen is as listed below. Patient denies any side effects of   medication.    Family history: positive for hypertension, diabetes mellitus and cardiovascular " "disease  Age at diagnosis of hyperlipidemia: 53 and hypertensin age 51  Cardiovascular risk factors: family history, lipids, hypertension, obesity, sedentary life style and stress    Current Outpatient Medications   Medication     albuterol (PROAIR HFA/PROVENTIL HFA/VENTOLIN HFA) 108 (90 Base) MCG/ACT inhaler     aspirin 81 MG tablet     atenolol 25 MG PO tablet     budesonide-formoterol (SYMBICORT) 80-4.5 MCG/ACT inhaler     COMPOUNDED NON-CONTROLLED SUBSTANCE (CMPD RX) - PHARMACY TO MIX COMPOUNDED MEDICATION     guaiFENesin-codeine (ROBITUSSIN AC) 100-10 MG/5ML solution     hydrOXYzine (VISTARIL) 25 MG capsule     ibuprofen (ADVIL/MOTRIN) 200 MG tablet     ketoconazole (NIZORAL) 2 % external cream     potassium chloride ER (K-DUR/KLOR-CON M) 20 MEQ CR tablet     predniSONE (DELTASONE) 10 MG tablet     PREMARIN cream     rosuvastatin 10 MG PO tablet     triamterene-HCTZ (MAXZIDE) 75-50 MG tablet     TYLENOL 325 MG OR TABS     valACYclovir (VALTREX) 1000 mg tablet     No current facility-administered medications for this visit.      Allergies   Allergen Reactions     Advicor Hives     Is a combination medication of niacin and lovastatin     Meperidine Hcl Hives     Methocarbamol Hives     Neomycin-Polymyxin-Dexameth Other (See Comments)       Social History     Tobacco Use     Smoking status: Never Smoker     Smokeless tobacco: Never Used   Substance Use Topics     Alcohol use: Yes     Comment: social; maybe 1 per week       OBJECTIVE:  /82 (BP Location: Left arm, Patient Position: Sitting, Cuff Size: Adult Large)   Pulse 67   Temp 98.2  F (36.8  C) (Oral)   Resp 16   Ht 1.619 m (5' 3.75\")   Wt 106.7 kg (235 lb 3.2 oz)   SpO2 98%   BMI 40.69 kg/m    Repeat BP R arm seated = 132/82 with large size cuff.  Skin: negative  Fundi: deferred  Lungs: negative, Percussion normal. Good diaphragmatic excursion. Lungs clear/ dry cough  Heart: negative, PMI normal. No lifts, heaves, or thrills. RRR. No murmurs, " clicks gallops or rub  Peripheral pulses: radial=4/4, femoral=4/4, popliteal=4/4, dorsalis pedis=4/4,  Abd: The abdomen is soft without tenderness, guarding, mass or organomegaly. Bowel sounds are normal. No CVA tenderness or inguinal adenopathy noted.  EXT: The lower extremities are normal and reveal no sign of DVT. Calves and thighs are soft and non tender, color is normal, no swelling or redness. Dinorah's sign is negative.  Pedal pulses are normal.

## 2020-04-02 NOTE — PATIENT INSTRUCTIONS
Follow asthma action plan  Finish out prednisone  Albuterol before be and use CPAP  Symptomatic care with decongestants, fluids, tylenol/advil prn. Use GUAIFENESIN  MG OR TBCR, 1 tab po BID (Twice per day), D: 20, R: 0 for congestion and cough.    In addition, I have suggested that the patient   monitor for symptoms of bacterial infection expecting slow gradual resolution of viral URI as the natural course.  CXR repeat 3-4 weeks/ schedule pulmonary visit      1)  Medication: continue current medication regimen unchanged awaiting labs  2)  Low fat, low cholesterol diet  3)  Regular aerobic exercise  4)  Recheck in 6 months, sooner should new symptoms or   problems arise.    Patient Education: Reviewed risks of elevated lipids and principles   of treatment.

## 2020-04-21 ENCOUNTER — TRANSFERRED RECORDS (OUTPATIENT)
Dept: HEALTH INFORMATION MANAGEMENT | Facility: CLINIC | Age: 70
End: 2020-04-21

## 2020-04-27 ENCOUNTER — TRANSFERRED RECORDS (OUTPATIENT)
Dept: FAMILY MEDICINE | Facility: CLINIC | Age: 70
End: 2020-04-27

## 2020-05-06 ENCOUNTER — HOSPITAL ENCOUNTER (OUTPATIENT)
Dept: CT IMAGING | Facility: CLINIC | Age: 70
End: 2020-05-06
Attending: INTERNAL MEDICINE
Payer: MEDICARE

## 2020-05-06 ENCOUNTER — HOSPITAL ENCOUNTER (OUTPATIENT)
Dept: LAB | Facility: CLINIC | Age: 70
End: 2020-05-06
Attending: INTERNAL MEDICINE
Payer: MEDICARE

## 2020-05-06 DIAGNOSIS — U07.1 COVID-19: Primary | ICD-10-CM

## 2020-05-06 DIAGNOSIS — J12.9 VIRAL PNEUMONIA: ICD-10-CM

## 2020-05-06 DIAGNOSIS — U07.1 COVID-19: ICD-10-CM

## 2020-05-06 PROCEDURE — 71250 CT THORAX DX C-: CPT

## 2020-05-06 PROCEDURE — 36415 COLL VENOUS BLD VENIPUNCTURE: CPT | Performed by: INTERNAL MEDICINE

## 2020-05-06 PROCEDURE — 86769 SARS-COV-2 COVID-19 ANTIBODY: CPT | Performed by: INTERNAL MEDICINE

## 2020-05-07 ENCOUNTER — TRANSFERRED RECORDS (OUTPATIENT)
Dept: HEALTH INFORMATION MANAGEMENT | Facility: CLINIC | Age: 70
End: 2020-05-07

## 2020-05-07 LAB
COVID-19 SPIKE RBD ABY TITER: NORMAL
COVID-19 SPIKE RBD ABY: NEGATIVE

## 2020-05-08 ENCOUNTER — TRANSFERRED RECORDS (OUTPATIENT)
Dept: HEALTH INFORMATION MANAGEMENT | Facility: CLINIC | Age: 70
End: 2020-05-08

## 2020-05-27 ENCOUNTER — TRANSFERRED RECORDS (OUTPATIENT)
Dept: FAMILY MEDICINE | Facility: CLINIC | Age: 70
End: 2020-05-27

## 2020-07-01 ENCOUNTER — HOSPITAL ENCOUNTER (OUTPATIENT)
Facility: CLINIC | Age: 70
End: 2020-07-01
Attending: ORTHOPAEDIC SURGERY | Admitting: ORTHOPAEDIC SURGERY
Payer: MEDICARE

## 2020-07-01 DIAGNOSIS — Z11.59 ENCOUNTER FOR SCREENING FOR OTHER VIRAL DISEASES: Primary | ICD-10-CM

## 2020-07-28 ENCOUNTER — HOSPITAL ENCOUNTER (OUTPATIENT)
Dept: CT IMAGING | Facility: CLINIC | Age: 70
Discharge: HOME | End: 2020-07-28
Attending: INTERNAL MEDICINE | Admitting: INTERNAL MEDICINE
Payer: MEDICARE

## 2020-07-28 DIAGNOSIS — J45.30 MILD PERSISTENT ASTHMA, UNCOMPLICATED: ICD-10-CM

## 2020-07-28 DIAGNOSIS — J12.9 VIRAL PNEUMONIA, UNSPECIFIED: ICD-10-CM

## 2020-07-28 PROCEDURE — 71250 CT THORAX DX C-: CPT

## 2020-07-29 ENCOUNTER — OFFICE VISIT (OUTPATIENT)
Dept: FAMILY MEDICINE | Facility: CLINIC | Age: 70
End: 2020-07-29

## 2020-07-29 ENCOUNTER — HOSPITAL ENCOUNTER (OUTPATIENT)
Dept: LAB | Facility: CLINIC | Age: 70
Discharge: HOME | End: 2020-07-29
Attending: ORTHOPAEDIC SURGERY | Admitting: PHYSICIAN ASSISTANT
Payer: MEDICARE

## 2020-07-29 VITALS
HEIGHT: 64 IN | HEART RATE: 66 BPM | OXYGEN SATURATION: 98 % | BODY MASS INDEX: 41.66 KG/M2 | WEIGHT: 244 LBS | TEMPERATURE: 98.4 F | SYSTOLIC BLOOD PRESSURE: 138 MMHG | DIASTOLIC BLOOD PRESSURE: 80 MMHG

## 2020-07-29 DIAGNOSIS — E78.5 HYPERLIPIDEMIA LDL GOAL <130: ICD-10-CM

## 2020-07-29 DIAGNOSIS — Z01.818 PRE-OPERATIVE EXAMINATION: Primary | ICD-10-CM

## 2020-07-29 DIAGNOSIS — M17.11 PRIMARY OSTEOARTHRITIS OF RIGHT KNEE: ICD-10-CM

## 2020-07-29 DIAGNOSIS — Z01.818 PREOPERATIVE EXAMINATION: Primary | ICD-10-CM

## 2020-07-29 DIAGNOSIS — I10 ESSENTIAL HYPERTENSION, BENIGN: ICD-10-CM

## 2020-07-29 DIAGNOSIS — E87.1 HYPONATREMIA: ICD-10-CM

## 2020-07-29 DIAGNOSIS — M79.672 LEFT FOOT PAIN: ICD-10-CM

## 2020-07-29 LAB
ALBUMIN SERPL-MCNC: 4.6 G/DL (ref 3.6–5.1)
ALBUMIN/GLOB SERPL: 2.4 {RATIO} (ref 1–2.5)
ALP SERPL-CCNC: 40 U/L (ref 33–130)
ALT 1742-6: 18 U/L (ref 0–32)
AST 1920-8: 18 U/L (ref 0–35)
BILIRUB SERPL-MCNC: 0.7 MG/DL (ref 0.2–1.2)
BUN SERPL-MCNC: 22 MG/DL (ref 7–25)
BUN/CREATININE RATIO: 23.2 (ref 6–22)
CALCIUM SERPL-MCNC: 9.7 MG/DL (ref 8.6–10.3)
CHLORIDE SERPLBLD-SCNC: 93.7 MMOL/L (ref 98–110)
CO2 SERPL-SCNC: 30.6 MMOL/L (ref 20–32)
CREAT SERPL-MCNC: 0.95 MG/DL (ref 0.7–1.18)
ERYTHROCYTE [DISTWIDTH] IN BLOOD BY AUTOMATED COUNT: 13.3 %
GLOBULIN, CALCULATED - QUEST: 1.9 (ref 1.9–3.7)
GLUCOSE SERPL-MCNC: 102 MG/DL (ref 60–99)
HCT VFR BLD AUTO: 37.1 % (ref 35–47)
HEMOGLOBIN: 12.9 G/DL (ref 11.7–15.7)
MCH RBC QN AUTO: 30 PG (ref 26–33)
MCHC RBC AUTO-ENTMCNC: 34.8 G/DL (ref 31–36)
MCV RBC AUTO: 86.3 FL (ref 78–100)
MRSA DNA SPEC QL NAA+PROBE: NEGATIVE
PLATELET COUNT - QUEST: 241 10^9/L (ref 150–375)
POTASSIUM SERPL-SCNC: 3.99 MMOL/L (ref 3.5–5.3)
PROT SERPL-MCNC: 6.5 G/DL (ref 6.1–8.1)
RBC # BLD AUTO: 4.3 10*12/L (ref 3.8–5.2)
SODIUM SERPL-SCNC: 132.4 MMOL/L (ref 135–146)
SPECIMEN SOURCE: NORMAL
WBC # BLD AUTO: 6.5 10*9/L (ref 4–11)

## 2020-07-29 PROCEDURE — 85027 COMPLETE CBC AUTOMATED: CPT | Performed by: PHYSICIAN ASSISTANT

## 2020-07-29 PROCEDURE — 87641 MR-STAPH DNA AMP PROBE: CPT | Performed by: PHYSICIAN ASSISTANT

## 2020-07-29 PROCEDURE — 90471 IMMUNIZATION ADMIN: CPT | Performed by: PHYSICIAN ASSISTANT

## 2020-07-29 PROCEDURE — 87640 STAPH A DNA AMP PROBE: CPT | Mod: XU | Performed by: PHYSICIAN ASSISTANT

## 2020-07-29 PROCEDURE — 90750 HZV VACC RECOMBINANT IM: CPT | Performed by: PHYSICIAN ASSISTANT

## 2020-07-29 PROCEDURE — 36415 COLL VENOUS BLD VENIPUNCTURE: CPT | Performed by: PHYSICIAN ASSISTANT

## 2020-07-29 PROCEDURE — 93000 ELECTROCARDIOGRAM COMPLETE: CPT | Performed by: PHYSICIAN ASSISTANT

## 2020-07-29 PROCEDURE — 80053 COMPREHEN METABOLIC PANEL: CPT | Performed by: PHYSICIAN ASSISTANT

## 2020-07-29 RX ORDER — BENZONATATE 100 MG/1
100 CAPSULE ORAL 3 TIMES DAILY PRN
COMMUNITY
End: 2021-01-14

## 2020-07-29 ASSESSMENT — MIFFLIN-ST. JEOR: SCORE: 1612.81

## 2020-07-29 NOTE — PROGRESS NOTES
[facility]  [address] [place] [address]  [address]  [facility] [address]  [phone]  Dept: [phone]    PRE-OP EVALUATION:  Today's date: 2020    Maria Victoria Arcos (: 1950) presents for pre-operative evaluation assessment as requested by Dr. Pedro.  She requires evaluation and anesthesia risk assessment prior to undergoing surgery/procedure for treatment of fluoro-injection L foot      Proposed Surgery/ Procedure: fluoro-injection L foot  Date of Surgery/ Procedure: 20  Time of Surgery/ Procedure:   Hospital/Surgical Facility: [facility]  Surgery Fax Number: Note does not need to be faxed, will be available electronically in Epic.  Primary Physician: Lissa Brasher  Type of Anesthesia Anticipated: to be determined    Preoperative Questionnaire:   No - Have you ever had a heart attack or stroke?  No - Have you ever had surgery on your heart or blood vessels, such as a stent, coronary (heart) bypass, or surgery on an artery in the head, neck, heart, or legs?  No - Do you have chest pain when you are physically active?  No - Do you have a history of heart failure?  No - Do you currently have a cold, bronchitis, or symptoms of other respiratory (head and chest) infections?  No - Do you have a cough, shortness of breath, or wheezing?  YES - DO YOU OR ANYONE IN YOUR FAMILY HAVE A HISTORY OF BLOOD CLOTS? 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   No - Do you or anyone in your family have a serious bleeding problem, such as long-lasting bleeding after surgeries or cuts?  YES - HAVE YOU EVERY HAD ANEMIA OR BEEN TOLD TO TAKE IRON PILLS? Had anemia prior to hysterectomy with no further issues.  No - Have you had any abnormal blood loss such as black, tarry or bloody stools, or abnormal vaginal bleeding?  No - Have you ever had a blood transfusion?  Yes - Are you willing to have a blood transfusion if it is medically needed before, during, or  after your surgery?  No - Have you or anyone in your family ever had problems with anesthesia (sedation for surgery)?  YES - DO YOU HAVE SLEEP APNEA, EXCESSIVE SNORING, OR DAYTIME DROWSINESS? Has known ISABELL.  DO YOU HAVE A CPAP MACHINE? Yes  No - Do you have any artifical heart valves or other implanted medical devices, such as a pacemaker, defibrillator, or continuous glucose monitor?  YES - DO YOU HAVE ANY ARTIFICIAL JOINTS? Left TKR ~2006  No - Are you allergic to latex?  No - Is there any chance that you may be pregnant?    Patient has a Health Care Directive or Living Will:  YES    HPI:     HPI related to upcoming procedure: Maria Victoria has been having ongoing right knee osteoarthritis for over the past 10 years. She successfully had left TKR in 2006. Has had some limited success with corticosteroid injections, so plan is for surgery.    HYPERLIPIDEMIA - Patient has a long history of significant Hyperlipidemia requiring medication for treatment with recent good control. Patient reports no problems or side effects with the medication.     HYPERTENSION - Patient has longstanding history of HTN , currently denies any symptoms referable to elevated blood pressure. Specifically denies chest pain, palpitations, dyspnea, orthopnea, PND or peripheral edema. Blood pressure readings have been in normal range. Current medication regimen is as listed below. Patient denies any side effects of medication.       MEDICAL HISTORY:     Patient Active Problem List    Diagnosis Date Noted     Health Care Home 06/17/2011     Priority: High     EMERGENCY CARE PLAN  Presenting Problem Signs and Symptoms Treatment Plan    Questions or conerns during clinic hours    I will call the clinic directly     Questions or conerns outside clinic hours    I will call the 24 hour nurse line at [phone]    Patient needs to schedule an appointment    I will call the 24 hour scheduling team at [phone] or clinic directly    Same day treatment      I will call the clinic first, nurse line if after hours, urgent care and express care if needed       DX V65.8 REPLACED WITH [id number] HEALTH CARE HOME (04/08/2013)       Moderate asthma with acute exacerbation, unspecified whether persistent 04/02/2020     Priority: Medium     Chronically low serum potassium 04/02/2020     Priority: Medium     Generalized edema 04/02/2020     Priority: Medium     Essential tremor 08/07/2018     Priority: Medium     Class 3 obesity without serious comorbidity with body mass index (BMI) of 40.0 to 44.9 in adult, unspecified obesity type 11/14/2017     Priority: Medium     Gastroesophageal reflux disease without esophagitis 06/13/2017     Priority: Medium     Essential hypertension, benign 06/13/2017     Priority: Medium     ACP (advance care planning) 12/09/2015     Priority: Medium     Advance Care Planning 12/9/2015: ACP Review of Chart / Resources Provided:  Reviewed chart for advance care plan.  Maria Victoria Arcos has no plan or code status on file. Discussed available resources and provided with information. Confirmed code status reflects current choices pending further ACP discussions.  Confirmed/documented legally designated decision maker(s). 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 -- Points: 0    The patient has not been  diagnosed with diabetes. -- Points: 0    The patient does not have a family history of CHD. -- Points:0          HCD (health care directive) 12/08/2011     Priority: Medium     Advance Directive Problem List Overview:   Name Relationship Phone    Primary Health Care Agent            Alternative Health Care Agent          Patient states has Advance Directive and will bring in a copy to clinic. 12/8/2011          Factor V Leiden mutation (H) 05/17/2010     Priority: Medium     Impaired fasting glucose 01/29/2010     Priority: Medium     Sleep apnea      Priority: Medium     CPAP       Herpes simplex virus (HSV) infection 02/09/2007     Priority: Medium     Problem list name updated by automated process. Provider to review       Generalized osteoarthrosis, unspecified site 10/18/2006     Priority: Medium      Past Medical History:   Diagnosis Date     Asthma      DVT of upper extremity (deep vein thrombosis) (H)      Essential hypertension, benign      Essential tremor 8/7/2018     Generalized osteoarthrosis, unspecified site 10/18/2006     Impaired fasting glucose 1/29/2010     Other and unspecified hyperlipidemia 10/18/2006     Sleep apnea     CPAP     Unspecified asthma(493.90)     mild     Past Surgical History:   Procedure Laterality Date     APPENDECTOMY       CARPAL TUNNEL RELEASE RT/LT       CHOLECYSTECTOMY, LAPOROSCOPIC      Cholecystectomy, Laparoscopic     COLONOSCOPY  9/04    diverticulosis     D & C       EYE SURGERY       HERNIORRHAPHY VENTRAL N/A 11/15/2015    Procedure: HERNIORRHAPHY VENTRAL;  Surgeon: Rell Green MD;  Location: [facility] OR     HYSTERECTOMY, SMITA      one ovary      INCISE FINGER TENDON SHEATH      right middle finger     SURGICAL HISTORY OF -   6/08    pubovaginal sling     SURGICAL HISTORY OF -   2006    left knee replacement     SURGICAL HISTORY OF -   2006    right thumb joint replacement     SURGICAL HISTORY OF -   1/09    left thumb joint replacement     SURGICAL HISTORY OF -    11/4/13    resection of urachal mass, parital cystectomy sigmoid colectomy. 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 100 tablet 3     atenolol (TENORMIN) 25 MG tablet Take 1 tablet (25 mg) by mouth daily 90 tablet 1     benzonatate (TESSALON) 100 MG capsule Take 100 mg by mouth 3 times daily as needed for cough       budesonide-formoterol (SYMBICORT) 80-4.5 MCG/ACT inhaler 1 puff 2 times daily. Pulmonology is filling this 1 Inhaler      COMPOUNDED NON-CONTROLLED SUBSTANCE (CMPD RX) - PHARMACY TO MIX COMPOUNDED MEDICATION Estradiol 0.02% cream (contains 0.2mg estradiol/gm of cream)- apply 1 gram daily to the vaginal area for one week then 1g 2-3 times weekly 30 g 11     guaiFENesin-codeine (ROBITUSSIN AC) 100-10 MG/5ML solution Take 5-10 mLs by mouth every 4 hours as needed for cough 236 mL 0     hydrOXYzine (VISTARIL) 25 MG capsule Take 25 mg by mouth       ibuprofen (ADVIL/MOTRIN) 200 MG tablet Take 200 mg by mouth every 4 hours as needed for mild pain       ketoconazole (NIZORAL) 2 % external cream APPLY EXTERNALLY TO THE AFFECTED AREA DAILY AS NEEDED 30 g 0     potassium chloride ER (KLOR-CON M) 20 MEQ CR tablet Take 1 tablet (20 mEq) by mouth daily 90 tablet 1     PREMARIN cream Place 1 g vaginally twice a week 30 g 4     rosuvastatin (CRESTOR) 10 MG tablet Take 1 tablet (10 mg) by mouth daily 90 tablet 1     triamterene-HCTZ (MAXZIDE) 75-50 MG tablet Take 1 tablet by mouth daily 90 tablet 1     TYLENOL 325 MG OR TABS ONE TO TWO TABLETS EVERY 4  "TO 6 HOURS AS NEEDED FOR PAIN 100 0     valACYclovir (VALTREX) 1000 mg tablet TAKE 2 TABLETS BY MOUTH TWICE DAILY 12 tablet 1     OTC products: LD Aspirin, Motrin, but will stop 7 days prior to surgery    Allergies   Allergen Reactions     Advicor Hives     Is a combination medication of niacin and lovastatin     Meperidine Hcl Hives     Methocarbamol Hives     Neomycin-Polymyxin-Dexameth Other (See Comments)      Latex Allergy: NO    Social History     Tobacco Use     Smoking status: Never Smoker     Smokeless tobacco: Never Used   Substance Use Topics     Alcohol use: Yes     Comment: social; maybe 1 per week     History   Drug Use No       REVIEW OF SYSTEMS:   Constitutional, neuro, ENT, endocrine, pulmonary, cardiac, gastrointestinal, genitourinary, musculoskeletal, integument and psychiatric systems are negative, except as otherwise noted.    EXAM:   /80 (BP Location: Right arm, Patient Position: Sitting, Cuff Size: Adult Large)   Pulse 66   Temp 98.4  F (36.9  C) (Oral)   Ht 1.619 m (5' 3.75\")   Wt 110.7 kg (244 lb)   SpO2 98%   BMI 42.21 kg/m      GENERAL APPEARANCE: healthy, alert and no distress     EYES: EOMI, PERRL     HENT: ear canals and TM's normal and nose and mouth without ulcers or lesions     NECK: no adenopathy, no asymmetry, masses, or scars and thyroid normal to palpation     RESP: lungs clear to auscultation - no rales, rhonchi or wheezes     CV: regular rates and rhythm, normal S1 S2, no S3 or S4 and no murmur, click or rub     ABDOMEN:  soft, nontender, no HSM or masses and bowel sounds normal     MS: extremities normal- no gross deformities noted, no evidence of inflammation in joints, FROM in all extremities.     SKIN: no suspicious lesions or rashes     NEURO: Normal strength and tone, sensory exam grossly normal, mentation intact and speech normal     PSYCH: mentation appears normal. and affect normal/bright     LYMPHATICS: No cervical adenopathy    DIAGNOSTICS:   EKG " (attached): appears normal, NSR, normal axis, normal intervals, no acute ST/T changes c/w ischemia, no LVH by voltage criteria, unchanged from previous tracings  Hemoglobin (indicated for history of anemia or procedure with significant blood loss such as tonsillectomy, major intraperitoneal surgery, vascular surgery, major spine surgery, total joint replacement)    Non fasting CBC, and CMP WNL. (Attached)    IMPRESSION:   Reason for surgery/procedure: Left foot pain  Diagnosis/reason for consult: Pre-operative Examination    The proposed surgical procedure is considered INTERMEDIATE risk.    REVISED CARDIAC RISK INDEX  The patient has the following serious cardiovascular risks for perioperative complications such as (MI, PE, VFib and 3  AV Block):  No serious cardiac risks  INTERPRETATION: 1 risks: Class II (low risk - 0.9% complication rate)    The patient has the following additional risks for perioperative complications:  No identified additional risks      ICD-10-CM    1. Pre-operative examination  Z01.818 VENOUS COLLECTION     HEMOGRAM/PLATELET (BFP)     EKG 12-lead complete w/read - Clinics     Comprehensive Metobolic Panel (BFP)   2. Primary osteoarthritis of right knee  M17.11 VENOUS COLLECTION     HEMOGRAM/PLATELET (BFP)     EKG 12-lead complete w/read - Clinics     Comprehensive Metobolic Panel (BFP)   3. Essential hypertension, benign  I10 VENOUS COLLECTION     Comprehensive Metobolic Panel (BFP)   4. Hyperlipidemia LDL goal <130  E78.5 VENOUS COLLECTION     Comprehensive Metobolic Panel (BFP)   5. Hyponatremia  E87.1 VENOUS COLLECTION     Basic Metabolic Panel (BFP)       RECOMMENDATIONS:     --Patient is to take all scheduled medications on the day of surgery EXCEPT for modifications listed below.    APPROVAL GIVEN to proceed with proposed procedure, without further diagnostic evaluation. Addended 8/24/2020 to approved left foot injection 8/26/2020.     1. Seven days before surgery do not take Aspirin  or any over-the-counter pain medications other than Tylenol.  TYLENOL is the safest pain pill to use before surgery because it does not affect your bleeding time. If tylenol is not sufficient for pain control talk to me or the surgeon and we will decide what is safe to use.    2. Do not eat anything after midnight  (jv of the surgery) and nothing the morning of the surgery.    3. Medications: Hold medications on the day of surgery, other than inhalers as needed.     4. Follow all instructions given by the surgery team. They usually give out a packet. Read it and please follow it precisely. This helps surgical experience and outcomes.    5. If you have any questions do not hesitate to call me or the surgeon/surgical team.      Renee Horta PA-C  [facility]           Signed Electronically by: Renee Horta PA-C    Copy of this evaluation report is provided to requesting physician.    [facility] Preop Guidelines    Revised Cardiac Risk Index

## 2020-07-29 NOTE — LETTER
[facility]  [address] [place] [address]  [address]  [facility] [address]  [phone]  Dept: [phone]    PRE-OP EVALUATION:  Today's date: 2020    Maria Victoria Arcos (: 1950) presents for pre-operative evaluation assessment as requested by Dr. Pedro.  She requires evaluation and anesthesia risk assessment prior to undergoing surgery/procedure for treatment of fluoro-injection L foot      Proposed Surgery/ Procedure: fluoro-injection L foot  Date of Surgery/ Procedure: 20  Time of Surgery/ Procedure:   Hospital/Surgical Facility: [facility]  Surgery Fax Number: Note does not need to be faxed, will be available electronically in Epic.  Primary Physician: Lissa Brasher  Type of Anesthesia Anticipated: to be determined    Preoperative Questionnaire:   No - Have you ever had a heart attack or stroke?  No - Have you ever had surgery on your heart or blood vessels, such as a stent, coronary (heart) bypass, or surgery on an artery in the head, neck, heart, or legs?  No - Do you have chest pain when you are physically active?  No - Do you have a history of heart failure?  No - Do you currently have a cold, bronchitis, or symptoms of other respiratory (head and chest) infections?  No - Do you have a cough, shortness of breath, or wheezing?  YES - DO YOU OR ANYONE IN YOUR FAMILY HAVE A HISTORY OF BLOOD CLOTS? 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   No - Do you or anyone in your family have a serious bleeding problem, such as long-lasting bleeding after surgeries or cuts?  YES - HAVE YOU EVERY HAD ANEMIA OR BEEN TOLD TO TAKE IRON PILLS? Had anemia prior to hysterectomy with no further issues.  No - Have you had any abnormal blood loss such as black, tarry or bloody stools, or abnormal vaginal bleeding?  No - Have you ever had a blood transfusion?  Yes - Are you willing to have a blood transfusion if it is medically needed before, during, or  after your surgery?  No - Have you or anyone in your family ever had problems with anesthesia (sedation for surgery)?  YES - DO YOU HAVE SLEEP APNEA, EXCESSIVE SNORING, OR DAYTIME DROWSINESS? Has known ISABELL.  DO YOU HAVE A CPAP MACHINE? Yes  No - Do you have any artifical heart valves or other implanted medical devices, such as a pacemaker, defibrillator, or continuous glucose monitor?  YES - DO YOU HAVE ANY ARTIFICIAL JOINTS? Left TKR ~2006  No - Are you allergic to latex?  No - Is there any chance that you may be pregnant?    Patient has a Health Care Directive or Living Will:  YES    HPI:     HPI related to upcoming procedure: Maria Victoria has been having ongoing right knee osteoarthritis for over the past 10 years. She successfully had left TKR in 2006. Has had some limited success with corticosteroid injections, so plan is for surgery.    HYPERLIPIDEMIA - Patient has a long history of significant Hyperlipidemia requiring medication for treatment with recent good control. Patient reports no problems or side effects with the medication.     HYPERTENSION - Patient has longstanding history of HTN , currently denies any symptoms referable to elevated blood pressure. Specifically denies chest pain, palpitations, dyspnea, orthopnea, PND or peripheral edema. Blood pressure readings have been in normal range. Current medication regimen is as listed below. Patient denies any side effects of medication.       MEDICAL HISTORY:     Patient Active Problem List    Diagnosis Date Noted     Health Care Home 06/17/2011     Priority: High     EMERGENCY CARE PLAN  Presenting Problem Signs and Symptoms Treatment Plan    Questions or conerns during clinic hours    I will call the clinic directly     Questions or conerns outside clinic hours    I will call the 24 hour nurse line at [phone]    Patient needs to schedule an appointment    I will call the 24 hour scheduling team at [phone] or clinic directly    Same day treatment      I will call the clinic first, nurse line if after hours, urgent care and express care if needed       DX V65.8 REPLACED WITH [id number] HEALTH CARE HOME (04/08/2013)       Moderate asthma with acute exacerbation, unspecified whether persistent 04/02/2020     Priority: Medium     Chronically low serum potassium 04/02/2020     Priority: Medium     Generalized edema 04/02/2020     Priority: Medium     Essential tremor 08/07/2018     Priority: Medium     Class 3 obesity without serious comorbidity with body mass index (BMI) of 40.0 to 44.9 in adult, unspecified obesity type 11/14/2017     Priority: Medium     Gastroesophageal reflux disease without esophagitis 06/13/2017     Priority: Medium     Essential hypertension, benign 06/13/2017     Priority: Medium     ACP (advance care planning) 12/09/2015     Priority: Medium     Advance Care Planning 12/9/2015: ACP Review of Chart / Resources Provided:  Reviewed chart for advance care plan.  Maria Victoria Arcos has no plan or code status on file. Discussed available resources and provided with information. Confirmed code status reflects current choices pending further ACP discussions.  Confirmed/documented legally designated decision maker(s). 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 -- Points: 0    The patient has not been  diagnosed with diabetes. -- Points: 0    The patient does not have a family history of CHD. -- Points:0          HCD (health care directive) 12/08/2011     Priority: Medium     Advance Directive Problem List Overview:   Name Relationship Phone    Primary Health Care Agent            Alternative Health Care Agent          Patient states has Advance Directive and will bring in a copy to clinic. 12/8/2011          Factor V Leiden mutation (H) 05/17/2010     Priority: Medium     Impaired fasting glucose 01/29/2010     Priority: Medium     Sleep apnea      Priority: Medium     CPAP       Herpes simplex virus (HSV) infection 02/09/2007     Priority: Medium     Problem list name updated by automated process. Provider to review       Generalized osteoarthrosis, unspecified site 10/18/2006     Priority: Medium      Past Medical History:   Diagnosis Date     Asthma      DVT of upper extremity (deep vein thrombosis) (H)      Essential hypertension, benign      Essential tremor 8/7/2018     Generalized osteoarthrosis, unspecified site 10/18/2006     Impaired fasting glucose 1/29/2010     Other and unspecified hyperlipidemia 10/18/2006     Sleep apnea     CPAP     Unspecified asthma(493.90)     mild     Past Surgical History:   Procedure Laterality Date     APPENDECTOMY       CARPAL TUNNEL RELEASE RT/LT       CHOLECYSTECTOMY, LAPOROSCOPIC      Cholecystectomy, Laparoscopic     COLONOSCOPY  9/04    diverticulosis     D & C       EYE SURGERY       HERNIORRHAPHY VENTRAL N/A 11/15/2015    Procedure: HERNIORRHAPHY VENTRAL;  Surgeon: Rell Green MD;  Location: [facility] OR     HYSTERECTOMY, SMITA      one ovary      INCISE FINGER TENDON SHEATH      right middle finger     SURGICAL HISTORY OF -   6/08    pubovaginal sling     SURGICAL HISTORY OF -   2006    left knee replacement     SURGICAL HISTORY OF -   2006    right thumb joint replacement     SURGICAL HISTORY OF -   1/09    left thumb joint replacement     SURGICAL HISTORY OF -    11/4/13    resection of urachal mass, parital cystectomy sigmoid colectomy. 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 100 tablet 3     atenolol (TENORMIN) 25 MG tablet Take 1 tablet (25 mg) by mouth daily 90 tablet 1     benzonatate (TESSALON) 100 MG capsule Take 100 mg by mouth 3 times daily as needed for cough       budesonide-formoterol (SYMBICORT) 80-4.5 MCG/ACT inhaler 1 puff 2 times daily. Pulmonology is filling this 1 Inhaler      COMPOUNDED NON-CONTROLLED SUBSTANCE (CMPD RX) - PHARMACY TO MIX COMPOUNDED MEDICATION Estradiol 0.02% cream (contains 0.2mg estradiol/gm of cream)- apply 1 gram daily to the vaginal area for one week then 1g 2-3 times weekly 30 g 11     guaiFENesin-codeine (ROBITUSSIN AC) 100-10 MG/5ML solution Take 5-10 mLs by mouth every 4 hours as needed for cough 236 mL 0     hydrOXYzine (VISTARIL) 25 MG capsule Take 25 mg by mouth       ibuprofen (ADVIL/MOTRIN) 200 MG tablet Take 200 mg by mouth every 4 hours as needed for mild pain       ketoconazole (NIZORAL) 2 % external cream APPLY EXTERNALLY TO THE AFFECTED AREA DAILY AS NEEDED 30 g 0     potassium chloride ER (KLOR-CON M) 20 MEQ CR tablet Take 1 tablet (20 mEq) by mouth daily 90 tablet 1     PREMARIN cream Place 1 g vaginally twice a week 30 g 4     rosuvastatin (CRESTOR) 10 MG tablet Take 1 tablet (10 mg) by mouth daily 90 tablet 1     triamterene-HCTZ (MAXZIDE) 75-50 MG tablet Take 1 tablet by mouth daily 90 tablet 1     TYLENOL 325 MG OR TABS ONE TO TWO TABLETS EVERY 4  "TO 6 HOURS AS NEEDED FOR PAIN 100 0     valACYclovir (VALTREX) 1000 mg tablet TAKE 2 TABLETS BY MOUTH TWICE DAILY 12 tablet 1     OTC products: LD Aspirin, Motrin, but will stop 7 days prior to surgery    Allergies   Allergen Reactions     Advicor Hives     Is a combination medication of niacin and lovastatin     Meperidine Hcl Hives     Methocarbamol Hives     Neomycin-Polymyxin-Dexameth Other (See Comments)      Latex Allergy: NO    Social History     Tobacco Use     Smoking status: Never Smoker     Smokeless tobacco: Never Used   Substance Use Topics     Alcohol use: Yes     Comment: social; maybe 1 per week     History   Drug Use No       REVIEW OF SYSTEMS:   Constitutional, neuro, ENT, endocrine, pulmonary, cardiac, gastrointestinal, genitourinary, musculoskeletal, integument and psychiatric systems are negative, except as otherwise noted.    EXAM:   /80 (BP Location: Right arm, Patient Position: Sitting, Cuff Size: Adult Large)   Pulse 66   Temp 98.4  F (36.9  C) (Oral)   Ht 1.619 m (5' 3.75\")   Wt 110.7 kg (244 lb)   SpO2 98%   BMI 42.21 kg/m      GENERAL APPEARANCE: healthy, alert and no distress     EYES: EOMI, PERRL     HENT: ear canals and TM's normal and nose and mouth without ulcers or lesions     NECK: no adenopathy, no asymmetry, masses, or scars and thyroid normal to palpation     RESP: lungs clear to auscultation - no rales, rhonchi or wheezes     CV: regular rates and rhythm, normal S1 S2, no S3 or S4 and no murmur, click or rub     ABDOMEN:  soft, nontender, no HSM or masses and bowel sounds normal     MS: extremities normal- no gross deformities noted, no evidence of inflammation in joints, FROM in all extremities.     SKIN: no suspicious lesions or rashes     NEURO: Normal strength and tone, sensory exam grossly normal, mentation intact and speech normal     PSYCH: mentation appears normal. and affect normal/bright     LYMPHATICS: No cervical adenopathy    DIAGNOSTICS:   EKG " (attached): appears normal, NSR, normal axis, normal intervals, no acute ST/T changes c/w ischemia, no LVH by voltage criteria, unchanged from previous tracings  Hemoglobin (indicated for history of anemia or procedure with significant blood loss such as tonsillectomy, major intraperitoneal surgery, vascular surgery, major spine surgery, total joint replacement)    Non fasting CBC, and CMP WNL. (Attached)    IMPRESSION:   Reason for surgery/procedure: Left foot pain  Diagnosis/reason for consult: Pre-operative Examination    The proposed surgical procedure is considered INTERMEDIATE risk.    REVISED CARDIAC RISK INDEX  The patient has the following serious cardiovascular risks for perioperative complications such as (MI, PE, VFib and 3  AV Block):  No serious cardiac risks  INTERPRETATION: 1 risks: Class II (low risk - 0.9% complication rate)    The patient has the following additional risks for perioperative complications:  No identified additional risks      ICD-10-CM    1. Pre-operative examination  Z01.818 VENOUS COLLECTION     HEMOGRAM/PLATELET (BFP)     EKG 12-lead complete w/read - Clinics     Comprehensive Metobolic Panel (BFP)   2. Primary osteoarthritis of right knee  M17.11 VENOUS COLLECTION     HEMOGRAM/PLATELET (BFP)     EKG 12-lead complete w/read - Clinics     Comprehensive Metobolic Panel (BFP)   3. Essential hypertension, benign  I10 VENOUS COLLECTION     Comprehensive Metobolic Panel (BFP)   4. Hyperlipidemia LDL goal <130  E78.5 VENOUS COLLECTION     Comprehensive Metobolic Panel (BFP)   5. Hyponatremia  E87.1 VENOUS COLLECTION     Basic Metabolic Panel (BFP)       RECOMMENDATIONS:     --Patient is to take all scheduled medications on the day of surgery EXCEPT for modifications listed below.    APPROVAL GIVEN to proceed with proposed procedure, without further diagnostic evaluation. Addended 8/24/2020 to approve left foot injection 8/26/2020.     1. Seven days before surgery do not take Aspirin or  any over-the-counter pain medications other than Tylenol.  TYLENOL is the safest pain pill to use before surgery because it does not affect your bleeding time. If tylenol is not sufficient for pain control talk to me or the surgeon and we will decide what is safe to use.    2. Do not eat anything after midnight  (jv of the surgery) and nothing the morning of the surgery.    3. Medications: Hold medications on the day of surgery, other than inhalers as needed.     4. Follow all instructions given by the surgery team. They usually give out a packet. Read it and please follow it precisely. This helps surgical experience and outcomes.    5. If you have any questions do not hesitate to call me or the surgeon/surgical team.      Renee Horta PA-C  [facility]      Signed Electronically by: Renee Horta PA-C    Copy of this evaluation report is provided to requesting physician.    [place] Preop Guidelines    Revised Cardiac Risk Index

## 2020-08-04 ENCOUNTER — MYC MEDICAL ADVICE (OUTPATIENT)
Dept: FAMILY MEDICINE | Facility: CLINIC | Age: 70
End: 2020-08-04

## 2020-08-04 DIAGNOSIS — Z11.59 ENCOUNTER FOR SCREENING FOR OTHER VIRAL DISEASES: Primary | ICD-10-CM

## 2020-08-20 DIAGNOSIS — E87.1 HYPONATREMIA: ICD-10-CM

## 2020-08-20 LAB
BUN SERPL-MCNC: 23 MG/DL (ref 7–25)
BUN/CREATININE RATIO: 25.3
CALCIUM SERPL-MCNC: 10.1 MG/DL (ref 8.6–10.3)
CHLORIDE SERPLBLD-SCNC: 96.9 MMOL/L (ref 98–110)
CO2 SERPL-SCNC: 31.2 MMOL/L (ref 20–32)
CREAT SERPL-MCNC: 0.9 MG/DL (ref 0.7–1.18)
GLUCOSE SERPL-MCNC: 110 MG/DL (ref 60–99)
POTASSIUM SERPL-SCNC: 3.84 MMOL/L (ref 3.5–5.3)
SODIUM SERPL-SCNC: 135.9 MMOL/L (ref 135–146)

## 2020-08-20 PROCEDURE — 80048 BASIC METABOLIC PNL TOTAL CA: CPT | Performed by: PHYSICIAN ASSISTANT

## 2020-08-20 PROCEDURE — 36415 COLL VENOUS BLD VENIPUNCTURE: CPT | Performed by: PHYSICIAN ASSISTANT

## 2020-08-21 ENCOUNTER — MYC MEDICAL ADVICE (OUTPATIENT)
Dept: FAMILY MEDICINE | Facility: CLINIC | Age: 70
End: 2020-08-21

## 2020-08-21 NOTE — TELEPHONE ENCOUNTER
Renee, patient called and left a message about this as well. [facility] would need this by Monday so the patient is wondering if we can addend the preop note for her knee surgery that she is no longer having until November. Are you willing to do this?

## 2020-09-10 DIAGNOSIS — I10 ESSENTIAL HYPERTENSION, BENIGN: ICD-10-CM

## 2020-09-11 RX ORDER — ATENOLOL 25 MG/1
TABLET ORAL
Qty: 90 TABLET | Refills: 1 | COMMUNITY
Start: 2020-09-11

## 2020-09-11 NOTE — TELEPHONE ENCOUNTER
Refused Prescriptions:                       Disp   Refills    atenolol (TENORMIN) 25 MG tablet [Pharmacy*90 tab*1        Sig: TAKE 1 TABLET(25 MG) BY MOUTH DAILY  Refused By: ROSARIO BRYAN  Reason for Refusal: Patient needs appointment        Pt due for a fasting office visit. Pt last seen for hypertension on 4/2/20 to follow up 6 months later.  Pt was seen for a pre op on 7/29/20, not a med check.

## 2020-09-24 DIAGNOSIS — E78.5 HYPERLIPIDEMIA LDL GOAL <130: ICD-10-CM

## 2020-09-24 DIAGNOSIS — R60.1 GENERALIZED EDEMA: ICD-10-CM

## 2020-09-24 DIAGNOSIS — I10 ESSENTIAL HYPERTENSION, BENIGN: ICD-10-CM

## 2020-09-24 DIAGNOSIS — E87.6 CHRONICALLY LOW SERUM POTASSIUM: ICD-10-CM

## 2020-09-25 RX ORDER — TRIAMTERENE AND HYDROCHLOROTHIAZIDE 75; 50 MG/1; MG/1
1 TABLET ORAL DAILY
Qty: 90 TABLET | Refills: 1 | COMMUNITY
Start: 2020-09-25

## 2020-09-25 RX ORDER — ROSUVASTATIN CALCIUM 10 MG/1
TABLET, COATED ORAL
Qty: 90 TABLET | Refills: 1 | COMMUNITY
Start: 2020-09-25

## 2020-09-25 RX ORDER — POTASSIUM CHLORIDE 1500 MG/1
TABLET, EXTENDED RELEASE ORAL
Qty: 90 TABLET | Refills: 1 | COMMUNITY
Start: 2020-09-25

## 2020-09-25 NOTE — TELEPHONE ENCOUNTER
Received incoming refill request for  Pending Prescriptions:                       Disp   Refills    potassium chloride ER (KLOR-CON M) 20 MEQ*90 tab*1            Sig: TAKE 1 TABLET(20 MEQ) BY MOUTH DAILY    triamterene-HCTZ (MAXZIDE) 75-50 MG table*90 tab*1            Sig: TAKE 1 TABLET BY MOUTH DAILY    rosuvastatin (CRESTOR) 10 MG tablet [Phar*90 tab*1            Sig: TAKE 1 TABLET(10 MG) BY MOUTH DAILY    Patient last had refills of these medications on 4/2/2020 for a 6 month supply. Patient is due for an office visit and there is no current appt scheduled at this time so they are being denied.

## 2020-10-09 ENCOUNTER — ALLIED HEALTH/NURSE VISIT (OUTPATIENT)
Dept: FAMILY MEDICINE | Facility: CLINIC | Age: 70
End: 2020-10-09

## 2020-10-09 DIAGNOSIS — Z23 NEED FOR VACCINATION: Primary | ICD-10-CM

## 2020-10-09 PROCEDURE — G0008 ADMIN INFLUENZA VIRUS VAC: HCPCS | Performed by: FAMILY MEDICINE

## 2020-10-09 PROCEDURE — 90686 IIV4 VACC NO PRSV 0.5 ML IM: CPT | Performed by: FAMILY MEDICINE

## 2020-11-05 ENCOUNTER — OFFICE VISIT (OUTPATIENT)
Dept: FAMILY MEDICINE | Facility: CLINIC | Age: 70
End: 2020-11-05

## 2020-11-05 VITALS
WEIGHT: 235.6 LBS | DIASTOLIC BLOOD PRESSURE: 86 MMHG | SYSTOLIC BLOOD PRESSURE: 144 MMHG | HEIGHT: 64 IN | TEMPERATURE: 98.5 F | OXYGEN SATURATION: 99 % | HEART RATE: 56 BPM | BODY MASS INDEX: 40.22 KG/M2 | RESPIRATION RATE: 20 BRPM

## 2020-11-05 DIAGNOSIS — E87.1 HYPONATREMIA: ICD-10-CM

## 2020-11-05 DIAGNOSIS — I10 ESSENTIAL HYPERTENSION, BENIGN: ICD-10-CM

## 2020-11-05 DIAGNOSIS — E78.5 HYPERLIPIDEMIA LDL GOAL <130: Primary | ICD-10-CM

## 2020-11-05 DIAGNOSIS — E72.11 HYPERHOMOCYSTEINEMIA (H): ICD-10-CM

## 2020-11-05 DIAGNOSIS — Z86.718 H/O DEEP VENOUS THROMBOSIS: ICD-10-CM

## 2020-11-05 DIAGNOSIS — D68.51 FACTOR V LEIDEN MUTATION (H): ICD-10-CM

## 2020-11-05 DIAGNOSIS — R60.1 GENERALIZED EDEMA: ICD-10-CM

## 2020-11-05 LAB
% GRANULOCYTES: 60.2 %
ALBUMIN SERPL-MCNC: 4.6 G/DL (ref 3.6–5.1)
ALBUMIN/GLOB SERPL: 2 {RATIO} (ref 1–2.5)
ALP SERPL-CCNC: 36 U/L (ref 33–130)
ALT 1742-6: 10 U/L (ref 0–32)
AST 1920-8: 13 U/L (ref 0–35)
BILIRUB SERPL-MCNC: 0.7 MG/DL (ref 0.2–1.2)
BUN SERPL-MCNC: 13 MG/DL (ref 7–25)
BUN/CREATININE RATIO: 16 (ref 6–22)
CALCIUM SERPL-MCNC: 10.3 MG/DL (ref 8.6–10.3)
CHLORIDE SERPLBLD-SCNC: 98 MMOL/L (ref 98–110)
CHOLEST SERPL-MCNC: 168 MG/DL (ref 0–199)
CHOLEST/HDLC SERPL: 3 {RATIO} (ref 0–5)
CO2 SERPL-SCNC: 32.3 MMOL/L (ref 20–32)
CREAT SERPL-MCNC: 0.81 MG/DL (ref 0.7–1.18)
GLOBULIN, CALCULATED - QUEST: 2.3 (ref 1.9–3.7)
GLUCOSE SERPL-MCNC: 110 MG/DL (ref 60–99)
HCT VFR BLD AUTO: 40.3 % (ref 35–47)
HDLC SERPL-MCNC: 64 MG/DL (ref 40–150)
HEMOGLOBIN: 13.2 G/DL (ref 11.7–15.7)
LDLC SERPL CALC-MCNC: 72 MG/DL (ref 0–130)
LYMPHOCYTES NFR BLD AUTO: 32.5 %
MCH RBC QN AUTO: 29.2 PG (ref 26–33)
MCHC RBC AUTO-ENTMCNC: 32.8 G/DL (ref 31–36)
MCV RBC AUTO: 89.2 FL (ref 78–100)
MONOCYTES NFR BLD AUTO: 7.3 %
PLATELET COUNT - QUEST: 222 10^9/L (ref 150–375)
POTASSIUM SERPL-SCNC: 4.02 MMOL/L (ref 3.5–5.3)
PROT SERPL-MCNC: 6.9 G/DL (ref 6.1–8.1)
RBC # BLD AUTO: 4.52 10*12/L (ref 3.8–5.2)
SODIUM SERPL-SCNC: 136.1 MMOL/L (ref 135–146)
TRIGL SERPL-MCNC: 162 MG/DL (ref 0–149)
WBC # BLD AUTO: 6.2 10*9/L (ref 4–11)

## 2020-11-05 PROCEDURE — 80061 LIPID PANEL: CPT | Performed by: FAMILY MEDICINE

## 2020-11-05 PROCEDURE — 36415 COLL VENOUS BLD VENIPUNCTURE: CPT | Performed by: FAMILY MEDICINE

## 2020-11-05 PROCEDURE — 99214 OFFICE O/P EST MOD 30 MIN: CPT | Performed by: FAMILY MEDICINE

## 2020-11-05 PROCEDURE — 85025 COMPLETE CBC W/AUTO DIFF WBC: CPT | Performed by: FAMILY MEDICINE

## 2020-11-05 PROCEDURE — 80053 COMPREHEN METABOLIC PANEL: CPT | Performed by: FAMILY MEDICINE

## 2020-11-05 RX ORDER — HYDROCHLOROTHIAZIDE 25 MG/1
25 TABLET ORAL DAILY
COMMUNITY
End: 2020-11-05

## 2020-11-05 RX ORDER — LISINOPRIL 10 MG/1
10 TABLET ORAL DAILY
COMMUNITY
End: 2020-11-05

## 2020-11-05 RX ORDER — ROSUVASTATIN CALCIUM 10 MG/1
10 TABLET, COATED ORAL DAILY
Qty: 90 TABLET | Refills: 3 | Status: SHIPPED | OUTPATIENT
Start: 2020-11-05 | End: 2021-04-07

## 2020-11-05 RX ORDER — ATENOLOL 50 MG/1
50 TABLET ORAL DAILY
Qty: 90 TABLET | Refills: 1 | Status: SHIPPED | OUTPATIENT
Start: 2020-11-05 | End: 2021-04-07

## 2020-11-05 RX ORDER — POTASSIUM CHLORIDE 1500 MG/1
20 TABLET, EXTENDED RELEASE ORAL DAILY
Qty: 90 TABLET | Refills: 1 | Status: SHIPPED | OUTPATIENT
Start: 2020-11-05 | End: 2021-04-07

## 2020-11-05 RX ORDER — HYDROCHLOROTHIAZIDE 25 MG/1
25 TABLET ORAL DAILY
Qty: 90 TABLET | Refills: 0 | Status: SHIPPED | OUTPATIENT
Start: 2020-11-05 | End: 2021-03-09

## 2020-11-05 RX ORDER — AMLODIPINE BESYLATE 10 MG/1
10 TABLET ORAL DAILY
Qty: 90 TABLET | Refills: 0 | Status: SHIPPED | OUTPATIENT
Start: 2020-11-05 | End: 2021-03-09

## 2020-11-05 RX ORDER — AMLODIPINE BESYLATE 10 MG/1
10 TABLET ORAL DAILY
COMMUNITY
End: 2020-11-05

## 2020-11-05 ASSESSMENT — MIFFLIN-ST. JEOR: SCORE: 1574.7

## 2020-11-05 NOTE — NURSING NOTE
Maria Victoria is here today for a fasting med recheck.    Pre-visit Screening:  Immunizations:  up to date  Colonoscopy:  is up to date  Mammogram: is up to date  Asthma Action Test/Plan:  NA  PHQ9:  NA  GAD7:  NA  Questioned patient about current smoking habits Pt. has never smoked.  Ok to leave detailed message on voice mail for today's visit only Yes, phone # [phone]

## 2020-11-05 NOTE — PATIENT INSTRUCTIONS
1)  Medication: dosage change: atenolol to 50 mgm and add Folic acid/ B6 and B12  2)  Dietary sodium restriction  3)  Regular aerobic exercise  4)  Recheck in 3 months, sooner should new symptoms or   problems arise.    Patient Education: Reviewed risks of hypertension and principles of   Treatment.    Come back for pre-op evaluation next week

## 2020-11-05 NOTE — PROGRESS NOTES
SUBJECTIVE:  Maria Victoria Arcos is an 70 year old female who presents for evaluation of   Hyperlipidemia and hypertension as she gets ready for a right knee replacement later this month. Of concern is her heterozygote Factor Leiden mutation and previous DVT.     She has been diagnosed in the past as having   combined hyperlipidemia and homocysteine levels. She indicates that she is feeling well   and denies any symptoms of cardiovascular disease. Specifically   denies chest pain, palpitations, dyspnea, orthopnea, PND,   claudication or peripheral edema. Treatment modalities employed to   this point include diet, regular aerobic exercise and Crestor. Current medication   regimen is as listed below. Patient denies any side effects of   medication.    Family history: positive for hypertension, diabetes mellitus and cardiovascular disease  Age at diagnosis of hyperlipidemia: 53 and hypertension age 51  Cardiovascular risk factors: family history, lipids, hypertension, obesity, sedentary life style, stress and heterozygote factor 5 Leiden mutation and homocystinemia    Current Outpatient Medications   Medication     albuterol (PROAIR HFA/PROVENTIL HFA/VENTOLIN HFA) 108 (90 Base) MCG/ACT inhaler     amLODIPine (NORVASC) 10 MG tablet     aspirin 81 MG tablet     atenolol (TENORMIN) 25 MG tablet     benzonatate (TESSALON) 100 MG capsule     budesonide-formoterol (SYMBICORT) 80-4.5 MCG/ACT inhaler     COMPOUNDED NON-CONTROLLED SUBSTANCE (CMPD RX) - PHARMACY TO MIX COMPOUNDED MEDICATION     guaiFENesin-codeine (ROBITUSSIN AC) 100-10 MG/5ML solution     hydrochlorothiazide (HYDRODIURIL) 25 MG tablet     hydrOXYzine (VISTARIL) 25 MG capsule     ibuprofen (ADVIL/MOTRIN) 200 MG tablet     ketoconazole (NIZORAL) 2 % external cream     potassium chloride ER (KLOR-CON M) 20 MEQ CR tablet     PREMARIN cream     rosuvastatin (CRESTOR) 10 MG tablet     TYLENOL 325 MG OR TABS     valACYclovir (VALTREX) 1000 mg tablet     No current  "facility-administered medications for this visit.      Allergies   Allergen Reactions     Advicor Hives     Is a combination medication of niacin and lovastatin     Meperidine Hcl Hives     Methocarbamol Hives     Neomycin-Polymyxin-Dexameth Other (See Comments)       Social History     Tobacco Use     Smoking status: Never Smoker     Smokeless tobacco: Never Used   Substance Use Topics     Alcohol use: Yes     Comment: social; maybe 1 per week       OBJECTIVE:  BP (!) 144/86 (BP Location: Left arm, Patient Position: Sitting, Cuff Size: Adult Large)   Pulse 56   Temp 98.5  F (36.9  C) (Oral)   Resp 20   Ht 1.619 m (5' 3.75\")   Wt 106.9 kg (235 lb 9.6 oz)   SpO2 99%   BMI 40.76 kg/m    Repeat BP R arm seated = 140-160 repeated systolic values with large size cuff.  Skin: negative  Fundi: deferred  Lungs: negative, Percussion normal. Good diaphragmatic excursion. Lungs clear  Heart: negative, PMI normal. No lifts, heaves, or thrills. RRR. No murmurs, clicks gallops or rub  Peripheral pulses: radial=4/4, femoral=4/4, popliteal=4/4, dorsalis pedis=4/4,    ASSESSMENT:    Await labs  Copied pervious hematology evaluation/ asked her to take this to orthopedic specialists to discuss Lovenox with surgery    Return for pre-op visit    (E78.5) Hyperlipidemia LDL goal <130  (primary encounter diagnosis)  Plan: Lipid Panel (BFP), VENOUS COLLECTION,         rosuvastatin (CRESTOR) 10 MG tablet        1)  Medication: continue current medication regimen unchanged  2)  Low fat, low cholesterol diet  3)  Regular aerobic exercise  4)  Recheck in 1 year, sooner should new symptoms or   problems arise.    Patient Education: Reviewed risks of elevated lipids and principles   of treatment.        (I10) Essential hypertension, benign  Plan: Comprehensive Metobolic Panel (BFP), VENOUS         COLLECTION, amLODIPine (NORVASC) 10 MG tablet,         hydrochlorothiazide (HYDRODIURIL) 25 MG tablet,        potassium chloride ER (KLOR-CON M) " 20 MEQ CR         tablet, atenolol (TENORMIN) 50 MG tablet        1)  Medication: dosage change: atenolol to 50 mgm  2)  Dietary sodium restriction  3)  Regular aerobic exercise  4)  Recheck in 3 months, sooner should new symptoms or   problems arise.    Patient Education: Reviewed risks of hypertension and principles of   treatment.        (R60.1) Generalized edema  Plan: Comprehensive Metobolic Panel (BFP), VENOUS         COLLECTION, hydrochlorothiazide (HYDRODIURIL)         25 MG tablet, potassium chloride ER (KLOR-CON         M) 20 MEQ CR tablet        resolved    (E87.1) Hyponatremia  Plan: Comprehensive Metobolic Panel (BFP), VENOUS         COLLECTION        Await labs    (E72.11) Hyperhomocysteinemia (H)  Plan: Folic Acid-Vit B6-Vit B12 2.5-25-1 MG TABS        Read handout    (D68.51) Factor V Leiden mutation (H)  Comment: LOVENOX used 11/2015 with surgery  Plan: HEMOGRAM PLATELET DIFF (BFP), VENOUS COLLECTION         (Z86.718) H/O deep venous thrombosis  Plan: HEMOGRAM PLATELET DIFF (BFP), VENOUS COLLECTION

## 2020-11-05 NOTE — PROGRESS NOTES
"[facility]  [address] [place] [address]  [address]  [facility] [address]  [phone]  Dept: [phone]    PRE-OP EVALUATION:  Today's date: 2020    Maria Victoria Arcos (: 1950) presents for pre-operative evaluation assessment as requested by Dr. Pedro.  She requires evaluation and anesthesia risk assessment prior to undergoing surgery/procedure for treatment of right total knee arthroplasty .    Proposed Surgery/ Procedure: right total knee arthorplasty  Date of Surgery/ Procedure: 2020  Time of Surgery/ Procedure: [facility]  Hospital/Surgical Facility: [facility]  Surgery Fax Number: Note does not need to be faxed, will be available electronically in Epic.  Primary Physician: Lissa Brasher  Type of Anesthesia Anticipated: to be determined    Preoperative Questionnaire:   {Rooming Staff - Questionnaire answers with negative defaults :767425::\"No - Have you ever had a heart attack or stroke?\",\"No - Have you ever had surgery on your heart or blood vessels, such as a stent, coronary (heart) bypass, or surgery on an artery in the head, neck, heart, or legs?\",\"No - Do you have chest pain when you are physically active?\",\"No - Do you have a history of heart failure?\",\"No - Do you currently have a cold, bronchitis, or symptoms of other respiratory (head and chest) infections?\",\"No - Do you have a cough, shortness of breath, or wheezing?\",\"No - Do you or anyone in your family have a history of blood clots?\",\"No - Do you or anyone in your family have a serious bleeding problem, such as long-lasting bleeding after surgeries or cuts?\",\"No - Have you ever had anemia or been told to take iron pills?\",\"No - Have you had any abnormal blood loss such as black, tarry or bloody stools, or abnormal vaginal bleeding?\",\"No - Have you ever had a blood transfusion?\",\"Yes - Are you willing to have a blood transfusion if it is medically needed before, during, or after your surgery?\",\"No - Have you or anyone in " "your family ever had problems with anesthesia (sedation for surgery)?\",\"No - Do you have sleep apnea, excessive snoring, or daytime drowsiness? \",\"No - Do you have any artifical heart valves or other implanted medical devices, such as a pacemaker, defibrillator, or continuous glucose monitor?\",\"No - Do you have any artifical joints?\",\"No - Are you allergic to latex?\",\"No - Is there any chance that you may be pregnant?\"}    Patient has a Health Care Directive or Living Will:  {YES/NO:004386::\"NO\"}    HPI:     HPI related to upcoming procedure: ***      {. Problems:188981}    MEDICAL HISTORY:     Patient Active Problem List    Diagnosis Date Noted     Health Care Home 06/17/2011     Priority: High     EMERGENCY CARE PLAN  Presenting Problem Signs and Symptoms Treatment Plan    Questions or conerns during clinic hours    I will call the clinic directly     Questions or conerns outside clinic hours    I will call the 24 hour nurse line at [phone]    Patient needs to schedule an appointment    I will call the 24 hour scheduling team at [phone] or clinic directly    Same day treatment     I will call the clinic first, nurse line if after hours, urgent care and express care if needed       DX V65.8 REPLACED WITH [id number] [facility] CARE HOME (04/08/2013)       Moderate asthma with acute exacerbation, unspecified whether persistent 04/02/2020     Priority: Medium     Chronically low serum potassium 04/02/2020     Priority: Medium     Generalized edema 04/02/2020     Priority: Medium     Essential tremor 08/07/2018     Priority: Medium     Class 3 obesity without serious comorbidity with body mass index (BMI) of 40.0 to 44.9 in adult, unspecified obesity type 11/14/2017     Priority: Medium     Gastroesophageal reflux disease without esophagitis 06/13/2017     Priority: Medium     Essential hypertension, benign 06/13/2017     Priority: Medium     ACP (advance care planning) 12/09/2015     Priority: Medium     Advance " Care Planning 12/9/2015: ACP Review of Chart / Resources Provided:  Reviewed chart for advance care plan.  Maria Victoria Arcos has no plan or code status on file. Discussed available resources and provided with information. Confirmed code status reflects current choices pending further ACP discussions.  Confirmed/documented legally designated decision maker(s). Added by Carri Morris             Morbid obesity due to excess calories (H) 12/09/2015     Priority: Medium     Cranial third nerve paresis, left/ diplopia 12/09/2015     Priority: Medium     Ventral hernia 11/15/2015     Priority: Medium     Rosacea 10/08/2012     Priority: Medium     Basal cell carcinoma of face 10/08/2012     Priority: Medium     Hyperlipidemia LDL goal <130 03/17/2012     Priority: Medium     [place] 10-year CHD Risk Score: 1% (11 Total Points)   Values used to calculate score:     Age: 61 years -- Points: 10     Total Cholesterol: 128 mg/dL -- Points: 0     HDL Cholesterol: 43 mg/dL -- Points: 1     Systolic BP (untreated): 110 mmHg -- Points: 0    The patient is not a smoker. -- Points: 0    The patient has not been diagnosed with diabetes. -- Points: 0    The patient does not have a family history of CHD. -- Points:0          HCD (health care directive) 12/08/2011     Priority: Medium     Advance Directive Problem List Overview:   Name Relationship Phone    Primary Health Care Agent            Alternative Health Care Agent          Patient states has Advance Directive and will bring in a copy to clinic. 12/8/2011     [facility] Regulatory Load OCT 2020       Factor V Leiden mutation (H) 05/17/2010     Priority: Medium     Impaired fasting glucose 01/29/2010     Priority: Medium     Sleep apnea      Priority: Medium     CPAP       Herpes simplex virus (HSV) infection 02/09/2007     Priority: Medium     Problem list name updated by automated process. Provider to review       Generalized osteoarthrosis, unspecified site 10/18/2006     Priority:  Medium      Past Medical History:   Diagnosis Date     Asthma      DVT of upper extremity (deep vein thrombosis) (H)      Essential hypertension, benign      Essential tremor 8/7/2018     Generalized osteoarthrosis, unspecified site 10/18/2006     Impaired fasting glucose 1/29/2010     Other and unspecified hyperlipidemia 10/18/2006     Sleep apnea     CPAP     Unspecified asthma(493.90)     mild     Past Surgical History:   Procedure Laterality Date     APPENDECTOMY       CARPAL TUNNEL RELEASE RT/LT       CHOLECYSTECTOMY, LAPOROSCOPIC      Cholecystectomy, Laparoscopic     COLONOSCOPY  9/04    diverticulosis     D & C       EYE SURGERY       HERNIORRHAPHY VENTRAL N/A 11/15/2015    Procedure: HERNIORRHAPHY VENTRAL;  Surgeon: Rell Green MD;  Location: [facility] OR     HYSTERECTOMY, SMITA      one ovary      INCISE FINGER TENDON SHEATH      right middle finger     SURGICAL HISTORY OF -   6/08    pubovaginal sling     SURGICAL HISTORY OF -   2006    left knee replacement     SURGICAL HISTORY OF -   2006    right thumb joint replacement     SURGICAL HISTORY OF -   1/09    left thumb joint replacement     SURGICAL HISTORY OF -   11/4/13    resection of urachal mass, parital cystectomy sigmoid colectomy. 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 100 tablet 3     atenolol (TENORMIN) 25 MG tablet Take 1 tablet (25 mg) by mouth daily 90 tablet 1     benzonatate (TESSALON) 100 MG capsule Take 100 mg by mouth 3 times daily as needed for cough        "budesonide-formoterol (SYMBICORT) 80-4.5 MCG/ACT inhaler 1 puff 2 times daily. Pulmonology is filling this 1 Inhaler      COMPOUNDED NON-CONTROLLED SUBSTANCE (CMPD RX) - PHARMACY TO MIX COMPOUNDED MEDICATION Estradiol 0.02% cream (contains 0.2mg estradiol/gm of cream)- apply 1 gram daily to the vaginal area for one week then 1g 2-3 times weekly 30 g 11     guaiFENesin-codeine (ROBITUSSIN AC) 100-10 MG/5ML solution Take 5-10 mLs by mouth every 4 hours as needed for cough 236 mL 0     hydrOXYzine (VISTARIL) 25 MG capsule Take 25 mg by mouth       ibuprofen (ADVIL/MOTRIN) 200 MG tablet Take 200 mg by mouth every 4 hours as needed for mild pain       ketoconazole (NIZORAL) 2 % external cream APPLY EXTERNALLY TO THE AFFECTED AREA DAILY AS NEEDED 30 g 0     potassium chloride ER (KLOR-CON M) 20 MEQ CR tablet Take 1 tablet (20 mEq) by mouth daily 90 tablet 1     PREMARIN cream Place 1 g vaginally twice a week 30 g 4     rosuvastatin (CRESTOR) 10 MG tablet Take 1 tablet (10 mg) by mouth daily 90 tablet 1     triamterene-HCTZ (MAXZIDE) 75-50 MG tablet Take 1 tablet by mouth daily 90 tablet 1     TYLENOL 325 MG OR TABS ONE TO TWO TABLETS EVERY 4 TO 6 HOURS AS NEEDED FOR PAIN 100 0     valACYclovir (VALTREX) 1000 mg tablet TAKE 2 TABLETS BY MOUTH TWICE DAILY 12 tablet 1     OTC products: {OTC ANALGESICS:863596}    Allergies   Allergen Reactions     Advicor Hives     Is a combination medication of niacin and lovastatin     Meperidine Hcl Hives     Methocarbamol Hives     Neomycin-Polymyxin-Dexameth Other (See Comments)      Latex Allergy: {YES/NO WITH DEFAULT:591882::\"NO\"}    Social History     Tobacco Use     Smoking status: Never Smoker     Smokeless tobacco: Never Used   Substance Use Topics     Alcohol use: Yes     Comment: social; maybe 1 per week     History   Drug Use No       REVIEW OF SYSTEMS:   {ROS Preop Choices:887708}    EXAM:   There were no vitals taken for this visit.  {EXAM Preop Choices:460233}    DIAGNOSTICS: " "  {DIAGNOSTIC FOR PREOP:033612}    Recent Labs   Lab Test 08/20/20 07/29/20  1401 07/29/20 04/02/20 07/09/18  1100 07/09/18  1100 11/14/15  0607 11/14/15  0607 04/03/15  0906 04/03/15  0906   HGB  --   --  12.9 14.3   < >  --    < > 12.8   < >  --    PLT  --   --  241 311   < >  --    < > 169   < >  --    INR  --   --   --   --   --   --   --  1.04  --   --    .9 132.4*  --  136.6   < >  --    < > 137   < > 137   POTASSIUM 3.84 3.99  --  3.81   < >  --    < > 3.4   < > 3.9   CR 0.90 0.95  --  0.88   < >  --    < > 0.77   < > 0.91   A1C  --   --   --   --   --  5.7  --   --   --  6.0    < > = values in this interval not displayed.        IMPRESSION:   {PREOP REASONS:738274::\"Reason for surgery/procedure: ***\",\"Diagnosis/reason for consult: ***\"}    The proposed surgical procedure is considered {HIGH=major cardiovascular or procedures requiring prolonged anesthesia >4 hours or large fluid shifts;    INTERMEDIATE=abdominal, most orthopedic and intrathoracic surgery; LOW= endoscopy, cataract and breast surgery:344227} risk.    REVISED CARDIAC RISK INDEX  The patient has the following serious cardiovascular risks for perioperative complications such as (MI, PE, VFib and 3  AV Block):  {PREOP REVISED CARDIAC INDEX (RCI):684880:p:\"No serious cardiac risks\"}  INTERPRETATION: {REVISED CARDIAC RISK INTERPRETATION:359173}    The patient has the following additional risks for perioperative complications:  {Additional perioperative risks:745469:p:\"No identified additional risks\"}    No diagnosis found.    RECOMMENDATIONS:     {IMPORTANT - Conditions - complete carefully!!:363548}    {IMPORTANT - Medications:235501::\"--Patient is to take all scheduled medications on the day of surgery EXCEPT for modifications listed below.\"}    {IMPORTANT - Approval:932259:p:\"APPROVAL GIVEN to proceed with proposed procedure, without further diagnostic evaluation\"}       Signed Electronically by: Lissa Brasher MD    Copy of this " evaluation report is provided to requesting physician.    [place] Preop Guidelines    Revised Cardiac Risk Index

## 2020-12-16 DIAGNOSIS — B35.4 TINEA CORPORIS: ICD-10-CM

## 2020-12-16 DIAGNOSIS — E66.01 MORBID OBESITY DUE TO EXCESS CALORIES (H): ICD-10-CM

## 2020-12-16 RX ORDER — BUDESONIDE AND FORMOTEROL FUMARATE DIHYDRATE 160; 4.5 UG/1; UG/1
AEROSOL RESPIRATORY (INHALATION)
COMMUNITY
Start: 2020-12-09 | End: 2021-01-07

## 2020-12-16 RX ORDER — KETOCONAZOLE 20 MG/G
CREAM TOPICAL
Qty: 30 G | Refills: 0 | Status: SHIPPED | OUTPATIENT
Start: 2020-12-16 | End: 2021-06-23

## 2020-12-16 RX ORDER — HYDROXYZINE PAMOATE 25 MG/1
CAPSULE ORAL
COMMUNITY
Start: 2020-12-09 | End: 2021-01-07

## 2020-12-16 NOTE — TELEPHONE ENCOUNTER
Last med recheck 11/05/20. Please send in if appropriate.    Maria Victoria Arcos is requesting a refill of:    Pending Prescriptions:                       Disp   Refills    ketoconazole (NIZORAL) 2 % external cream 30 g   0            Sig: APPLY EXTERNALLY TO THE AFFECTED AREA DAILY AS           NEEDED

## 2020-12-24 NOTE — TELEPHONE ENCOUNTER
From: Maria Victoria Arcos  To: Jcaqui Landers PA  Sent: 7/31/2017 12:03 AM CDT  Subject: Question about a normal test result    Did the results of the cardiac monitoring come back to your office? I returned the monitor on July 3.     Thanks, Maria Victoria Arcos   -per hematology consult, hydroxyurea on hold, no indication for treatment for now  -goal Hct <42% per CYTO-PV trial  -will be trending CBC

## 2021-01-04 DIAGNOSIS — Z11.59 ENCOUNTER FOR SCREENING FOR OTHER VIRAL DISEASES: Primary | ICD-10-CM

## 2021-01-14 ENCOUNTER — OFFICE VISIT (OUTPATIENT)
Dept: FAMILY MEDICINE | Facility: CLINIC | Age: 71
End: 2021-01-14

## 2021-01-14 ENCOUNTER — AMBULATORY - HEALTHEAST (OUTPATIENT)
Dept: OTHER | Facility: CLINIC | Age: 71
End: 2021-01-14

## 2021-01-14 ENCOUNTER — DOCUMENTATION ONLY (OUTPATIENT)
Dept: OTHER | Facility: CLINIC | Age: 71
End: 2021-01-14

## 2021-01-14 VITALS
OXYGEN SATURATION: 98 % | HEART RATE: 54 BPM | TEMPERATURE: 98 F | DIASTOLIC BLOOD PRESSURE: 78 MMHG | RESPIRATION RATE: 20 BRPM | WEIGHT: 232.4 LBS | HEIGHT: 64 IN | BODY MASS INDEX: 39.67 KG/M2 | SYSTOLIC BLOOD PRESSURE: 132 MMHG

## 2021-01-14 DIAGNOSIS — J45.901 MODERATE ASTHMA WITH ACUTE EXACERBATION, UNSPECIFIED WHETHER PERSISTENT: ICD-10-CM

## 2021-01-14 DIAGNOSIS — M17.11 PRIMARY OSTEOARTHRITIS OF RIGHT KNEE: Primary | ICD-10-CM

## 2021-01-14 DIAGNOSIS — Z01.818 PRE-OP EXAM: ICD-10-CM

## 2021-01-14 DIAGNOSIS — G47.33 OBSTRUCTIVE SLEEP APNEA SYNDROME: ICD-10-CM

## 2021-01-14 DIAGNOSIS — D68.51 FACTOR V LEIDEN MUTATION (H): ICD-10-CM

## 2021-01-14 LAB
HEMOGLOBIN: 13.4 G/DL (ref 11.7–15.7)
POTASSIUM SERPL-SCNC: 4.19 MMOL/L (ref 3.5–5.3)

## 2021-01-14 PROCEDURE — 84132 ASSAY OF SERUM POTASSIUM: CPT | Performed by: FAMILY MEDICINE

## 2021-01-14 PROCEDURE — 36415 COLL VENOUS BLD VENIPUNCTURE: CPT | Performed by: FAMILY MEDICINE

## 2021-01-14 PROCEDURE — 99214 OFFICE O/P EST MOD 30 MIN: CPT | Performed by: FAMILY MEDICINE

## 2021-01-14 PROCEDURE — 85018 HEMOGLOBIN: CPT | Performed by: FAMILY MEDICINE

## 2021-01-14 RX ORDER — BUDESONIDE AND FORMOTEROL FUMARATE DIHYDRATE 160; 4.5 UG/1; UG/1
2 AEROSOL RESPIRATORY (INHALATION) 2 TIMES DAILY
COMMUNITY
Start: 2021-01-14 | End: 2022-07-05

## 2021-01-14 ASSESSMENT — MIFFLIN-ST. JEOR: SCORE: 1560.19

## 2021-01-14 NOTE — H&P (VIEW-ONLY)
[facility]  [address] [place] [address]  [address]  [facility] [address]  Phone: [phone]  Fax: [phone]  Primary Provider: Lissa Pritchard  Pre-op Performing Provider: LISSA PRITCHARD    PREOPERATIVE EVALUATION:  Today's date: 1/14/2021    Maria Victoria Arocs is a 70 year old female who presents for a preoperative evaluation.    Surgical Information:  Surgery/Procedure: Right total knee arthroplasty  Surgery Location: [facility]  Surgeon: Dr. Pedro  Surgery Date: 01/26/21  Time of Surgery: AM  Where patient plans to recover: At home with family  Fax number for surgical facility: Note does not need to be faxed, will be available electronically in Epic.    Type of Anesthesia Anticipated: to be determined    Subjective     HPI related to upcoming procedure: right knee replacement/ previous left knee replacement    Health Care Directive:  Patient has a Health Care Directive on file      Preoperative Review of :   reviewed - no record of controlled substances prescribed.      Past Medical History:   Diagnosis Date     Asthma      Coagulation disorder (H)     factor v Leiden     DVT of upper extremity (deep vein thrombosis) (H)      Essential hypertension, benign      Essential tremor 8/7/2018     Generalized osteoarthrosis, unspecified site 10/18/2006     Impaired fasting glucose 1/29/2010     Other and unspecified hyperlipidemia 10/18/2006     Sleep apnea     CPAP     Unspecified asthma(493.90)     mild         Review of Systems  Constitutional, neuro, ENT, endocrine, pulmonary, cardiac, gastrointestinal, genitourinary, musculoskeletal, integument and psychiatric systems are negative, except as otherwise noted.    Patient Active Problem List    Diagnosis Date Noted     Health Care Home 06/17/2011     Priority: High     EMERGENCY CARE PLAN  Presenting Problem Signs and Symptoms Treatment Plan    Questions or conerns during clinic hours    I will call the clinic directly     Questions or conerns  outside clinic hours    I will call the 24 hour nurse line at [phone]    Patient needs to schedule an appointment    I will call the 24 hour scheduling team at [phone] or clinic directly    Same day treatment     I will call the clinic first, nurse line if after hours, urgent care and express care if needed       DX V65.8 REPLACED WITH [id number] HEALTH CARE HOME (04/08/2013)       Moderate asthma with acute exacerbation, unspecified whether persistent 04/02/2020     Priority: Medium     Chronically low serum potassium 04/02/2020     Priority: Medium     Generalized edema 04/02/2020     Priority: Medium     Essential tremor 08/07/2018     Priority: Medium     Class 3 obesity without serious comorbidity with body mass index (BMI) of 40.0 to 44.9 in adult, unspecified obesity type 11/14/2017     Priority: Medium     Gastroesophageal reflux disease without esophagitis 06/13/2017     Priority: Medium     Essential hypertension, benign 06/13/2017     Priority: Medium     Morbid obesity due to excess calories (H) 12/09/2015     Priority: Medium     Cranial third nerve paresis, left/ diplopia 12/09/2015     Priority: Medium     Ventral hernia 11/15/2015     Priority: Medium     Rosacea 10/08/2012     Priority: Medium     Basal cell carcinoma of face 10/08/2012     Priority: Medium     Hyperlipidemia LDL goal <130 03/17/2012     Priority: Medium     [place] 10-year CHD Risk Score: 1% (11 Total Points)   Values used to calculate score:     Age: 61 years -- Points: 10     Total Cholesterol: 128 mg/dL -- Points: 0     HDL Cholesterol: 43 mg/dL -- Points: 1     Systolic BP (untreated): 110 mmHg -- Points: 0    The patient is not a smoker. -- Points: 0    The patient has not been diagnosed with diabetes. -- Points: 0    The patient does not have a family history of CHD. -- Points:0          Factor V Leiden mutation (H) 05/17/2010     Priority: Medium     Impaired fasting glucose 01/29/2010     Priority: Medium     Sleep  apnea      Priority: Medium     CPAP       Herpes simplex virus (HSV) infection 02/09/2007     Priority: Medium     Problem list name updated by automated process. Provider to review       Generalized osteoarthrosis, unspecified site 10/18/2006     Priority: Medium      Past Medical History:   Diagnosis Date     Asthma      Coagulation disorder (H)     factor v Leiden     DVT of upper extremity (deep vein thrombosis) (H)      Essential hypertension, benign      Essential tremor 8/7/2018     Generalized osteoarthrosis, unspecified site 10/18/2006     Impaired fasting glucose 1/29/2010     Other and unspecified hyperlipidemia 10/18/2006     Sleep apnea     CPAP     Unspecified asthma(493.90)     mild     Past Surgical History:   Procedure Laterality Date     APPENDECTOMY       CARPAL TUNNEL RELEASE RT/LT       CHOLECYSTECTOMY, LAPOROSCOPIC      Cholecystectomy, Laparoscopic     COLONOSCOPY  09/2004    diverticulosis     D & C       EYE SURGERY       FOOT SURGERY Left 07/2020    injection     HERNIORRHAPHY VENTRAL N/A 11/15/2015    Procedure: HERNIORRHAPHY VENTRAL;  Surgeon: Rell Green MD;  Location: [facility] OR     HYSTERECTOMY, SMITA      one ovary      INCISE FINGER TENDON SHEATH      right middle finger     SURGICAL HISTORY OF -   06/2008    pubovaginal sling     SURGICAL HISTORY OF -   2006    left knee replacement     SURGICAL HISTORY OF -   2006    right thumb joint replacement     SURGICAL HISTORY OF -   01/2009    left thumb joint replacement     SURGICAL HISTORY OF -   11/04/2013    resection of urachal mass, parital cystectomy sigmoid colectomy. 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     SURGICAL HISTORY OF -   11/2013    cystoscopic removal of bladdermass prior to surgery on 11/4/13     SURGICAL HISTORY OF -   04/2015    Moh's for basal cell ca on foreheard     Current Outpatient Medications   Medication Sig Dispense Refill     albuterol (PROAIR HFA/PROVENTIL HFA/VENTOLIN HFA) 108 (90 Base) MCG/ACT inhaler  Inhale 2 puffs into the lungs every 2 hours as needed for shortness of breath / dyspnea 1 Inhaler 3     amLODIPine (NORVASC) 10 MG tablet Take 1 tablet (10 mg) by mouth daily 90 tablet 0     aspirin 81 MG tablet Take 1 tablet by mouth daily. 100 tablet 3     atenolol (TENORMIN) 50 MG tablet Take 1 tablet (50 mg) by mouth daily 90 tablet 1     budesonide-formoterol (SYMBICORT) 160-4.5 MCG/ACT Inhaler Inhale 2 puffs into the lungs 2 times daily       COMPOUNDED NON-CONTROLLED SUBSTANCE (CMPD RX) - PHARMACY TO MIX COMPOUNDED MEDICATION Estradiol 0.02% cream (contains 0.2mg estradiol/gm of cream)- apply 1 gram daily to the vaginal area for one week then 1g 2-3 times weekly 30 g 11     Folic Acid-Vit B6-Vit B12 2.5-25-1 MG TABS Take 1 tablet by mouth daily 90 tablet 3     hydrochlorothiazide (HYDRODIURIL) 25 MG tablet Take 1 tablet (25 mg) by mouth daily 90 tablet 0     ibuprofen (ADVIL/MOTRIN) 200 MG tablet Take 200 mg by mouth every 4 hours as needed for mild pain       ketoconazole (NIZORAL) 2 % external cream APPLY EXTERNALLY TO THE AFFECTED AREA DAILY AS NEEDED 30 g 0     potassium chloride ER (KLOR-CON M) 20 MEQ CR tablet Take 1 tablet (20 mEq) by mouth daily 90 tablet 1     PREMARIN cream Place 1 g vaginally twice a week 30 g 4     rosuvastatin (CRESTOR) 10 MG tablet Take 1 tablet (10 mg) by mouth daily 90 tablet 3     TYLENOL 325 MG OR TABS ONE TO TWO TABLETS EVERY 4 TO 6 HOURS AS NEEDED FOR PAIN 100 0     valACYclovir (VALTREX) 1000 mg tablet TAKE 2 TABLETS BY MOUTH TWICE DAILY (Patient taking differently: TAKE 2 TABLETS BY MOUTH TWICE DAILY PRN) 12 tablet 1     VITAMIN D PO Take by mouth daily         Allergies   Allergen Reactions     Advicor Hives     Is a combination medication of niacin and lovastatin     Meperidine Hcl Hives     Methocarbamol Hives     Neomycin-Polymyxin-Dexameth Other (See Comments)        Social History     Tobacco Use     Smoking status: Never Smoker     Smokeless tobacco: Never Used  "  Substance Use Topics     Alcohol use: Yes     Comment: social; maybe 1 per week     Family History   Problem Relation Age of Onset     C.A.D. Father         under age 55     Diabetes Father      Hypertension Father      Cerebrovascular Disease Father      Neurologic Disorder Father         dementia; Parkinson's     Obesity Father      Diabetes Mother      Hypertension Mother      Cerebrovascular Disease Mother      Circulatory Mother         ITP     Gastrointestinal Disease Mother         severe diverticulitis     Obesity Mother      Heart Disease Mother         paroxysmal a fib     Arthritis Mother      Allergies Mother      Blood Disease Mother      Breast Cancer Mother         age 89     Lipids Sister      Obesity Sister      Arthritis Sister      Breast Cancer Maternal Aunt      Breast Cancer Maternal Aunt      History   Drug Use No         Objective     /78 (BP Location: Right arm, Patient Position: Sitting, Cuff Size: Adult Large)   Pulse 54   Temp 98  F (36.7  C) (Oral)   Resp 20   Ht 1.619 m (5' 3.75\")   Wt 105.4 kg (232 lb 6.4 oz)   SpO2 98%   BMI 40.20 kg/m        Physical Exam    GENERAL APPEARANCE: healthy, alert and no distress     EYES: EOMI, PERRL     HENT: ear canals and TM's normal and nose and mouth without ulcers or lesions     NECK: no adenopathy, no asymmetry, masses, or scars and thyroid normal to palpation     RESP: lungs clear to auscultation - no rales, rhonchi or wheezes     CV: regular rates and rhythm, normal S1 S2, no S3 or S4 and no murmur, click or rub     ABDOMEN:  soft, nontender, no HSM or masses and bowel sounds normal     MS: extremities normal- no gross deformities noted, no evidence of inflammation in joints, FROM in all extremities.     SKIN: no suspicious lesions or rashes     NEURO: Normal strength and tone, sensory exam grossly normal, mentation intact and speech normal     PSYCH: mentation appears normal. and affect normal/bright     LYMPHATICS: No cervical " adenopathy    Recent Labs   Lab Test 11/05/20  1342 11/05/20  1213 08/20/20 07/29/20  0000 07/29/20   HGB  --  13.2  --   --  12.9   PLT  --  222  --   --  241   .1  --  135.9   < >  --    POTASSIUM 4.02  --  3.84   < >  --    CR 0.81  --  0.90   < >  --     < > = values in this interval not displayed.        Diagnostics:  HGB :13.4 gm/dl  Potassium: 4.19  No EKG/ done  7/2020 in [facility]    Revised Cardiac Risk Index (RCRI):  The patient has the following serious cardiovascular risks for perioperative complications:   - High risk surgery (>5% cardiac complication risk) = 1 point     RCRI Interpretation: 1 point: Class II (low risk - 0.9% complication rate)    Factor V leiden/ needs Lovenox post surgery     Assessment & Plan   The proposed surgical procedure is considered INTERMEDIATE risk.    Primary osteoarthritis of right knee    - HEMOGLOBIN (BFP)  - VENOUS COLLECTION    Pre-op exam    - HEMOGLOBIN (BFP)  - VENOUS COLLECTION    Factor V Leiden mutation (H)  Needs Lovenox post op for prevention of DVT    Moderate asthma with acute exacerbation, unspecified whether persistent      Obstructive sleep apnea syndrome  Bring CPAP to hospital      Risks and Recommendations:  The patient has the following additional risks and recommendations for perioperative complications:   - Morbid obesity (BMI >40)  Obstructive Sleep Apnea:   DVT risk  Anemia/Bleeding/Clotting:    - History of DVT or PE, consider DVT prevention postoperatively    Medication Instructions:   - aspirin: Discontinue aspirin 7-10 days prior to procedure to reduce bleeding risk. It should be resumed postoperatively.     RECOMMENDATION:  APPROVAL GIVEN to proceed with proposed procedure, without further diagnostic evaluation.    Signed Electronically by: Lissa Brasher MD    Copy of this evaluation report is provided to requesting physician.

## 2021-01-14 NOTE — PROGRESS NOTES
[facility]  [address] [place] [address]  [address]  [facility] [address]  Phone: [phone]  Fax: [phone]  Primary Provider: Lissa Pritchard  Pre-op Performing Provider: LISSA PRITCHARD    PREOPERATIVE EVALUATION:  Today's date: 1/14/2021    Maria Victoria Arcos is a 70 year old female who presents for a preoperative evaluation.    Surgical Information:  Surgery/Procedure: Right total knee arthroplasty  Surgery Location: [facility]  Surgeon: Dr. Pedro  Surgery Date: 01/26/21  Time of Surgery: AM  Where patient plans to recover: At home with family  Fax number for surgical facility: Note does not need to be faxed, will be available electronically in Epic.    Type of Anesthesia Anticipated: to be determined    Subjective     HPI related to upcoming procedure: right knee replacement/ previous left knee replacement    Health Care Directive:  Patient has a Health Care Directive on file      Preoperative Review of :   reviewed - no record of controlled substances prescribed.      Past Medical History:   Diagnosis Date     Asthma      Coagulation disorder (H)     factor v Leiden     DVT of upper extremity (deep vein thrombosis) (H)      Essential hypertension, benign      Essential tremor 8/7/2018     Generalized osteoarthrosis, unspecified site 10/18/2006     Impaired fasting glucose 1/29/2010     Other and unspecified hyperlipidemia 10/18/2006     Sleep apnea     CPAP     Unspecified asthma(493.90)     mild         Review of Systems  Constitutional, neuro, ENT, endocrine, pulmonary, cardiac, gastrointestinal, genitourinary, musculoskeletal, integument and psychiatric systems are negative, except as otherwise noted.    Patient Active Problem List    Diagnosis Date Noted     Health Care Home 06/17/2011     Priority: High     EMERGENCY CARE PLAN  Presenting Problem Signs and Symptoms Treatment Plan    Questions or conerns during clinic hours    I will call the clinic directly     Questions or conerns  outside clinic hours    I will call the 24 hour nurse line at [phone]    Patient needs to schedule an appointment    I will call the 24 hour scheduling team at [phone] or clinic directly    Same day treatment     I will call the clinic first, nurse line if after hours, urgent care and express care if needed       DX V65.8 REPLACED WITH 12500 HEALTH CARE HOME (04/08/2013)       Moderate asthma with acute exacerbation, unspecified whether persistent 04/02/2020     Priority: Medium     Chronically low serum potassium 04/02/2020     Priority: Medium     Generalized edema 04/02/2020     Priority: Medium     Essential tremor 08/07/2018     Priority: Medium     Class 3 obesity without serious comorbidity with body mass index (BMI) of 40.0 to 44.9 in adult, unspecified obesity type 11/14/2017     Priority: Medium     Gastroesophageal reflux disease without esophagitis 06/13/2017     Priority: Medium     Essential hypertension, benign 06/13/2017     Priority: Medium     Morbid obesity due to excess calories (H) 12/09/2015     Priority: Medium     Cranial third nerve paresis, left/ diplopia 12/09/2015     Priority: Medium     Ventral hernia 11/15/2015     Priority: Medium     Rosacea 10/08/2012     Priority: Medium     Basal cell carcinoma of face 10/08/2012     Priority: Medium     Hyperlipidemia LDL goal <130 03/17/2012     Priority: Medium     [place] 10-year CHD Risk Score: 1% (11 Total Points)   Values used to calculate score:     Age: 61 years -- Points: 10     Total Cholesterol: 128 mg/dL -- Points: 0     HDL Cholesterol: 43 mg/dL -- Points: 1     Systolic BP (untreated): 110 mmHg -- Points: 0    The patient is not a smoker. -- Points: 0    The patient has not been diagnosed with diabetes. -- Points: 0    The patient does not have a family history of CHD. -- Points:0          Factor V Leiden mutation (H) 05/17/2010     Priority: Medium     Impaired fasting glucose 01/29/2010     Priority: Medium     Sleep  apnea      Priority: Medium     CPAP       Herpes simplex virus (HSV) infection 02/09/2007     Priority: Medium     Problem list name updated by automated process. Provider to review       Generalized osteoarthrosis, unspecified site 10/18/2006     Priority: Medium      Past Medical History:   Diagnosis Date     Asthma      Coagulation disorder (H)     factor v Leiden     DVT of upper extremity (deep vein thrombosis) (H)      Essential hypertension, benign      Essential tremor 8/7/2018     Generalized osteoarthrosis, unspecified site 10/18/2006     Impaired fasting glucose 1/29/2010     Other and unspecified hyperlipidemia 10/18/2006     Sleep apnea     CPAP     Unspecified asthma(493.90)     mild     Past Surgical History:   Procedure Laterality Date     APPENDECTOMY       CARPAL TUNNEL RELEASE RT/LT       CHOLECYSTECTOMY, LAPOROSCOPIC      Cholecystectomy, Laparoscopic     COLONOSCOPY  09/2004    diverticulosis     D & C       EYE SURGERY       FOOT SURGERY Left 07/2020    injection     HERNIORRHAPHY VENTRAL N/A 11/15/2015    Procedure: HERNIORRHAPHY VENTRAL;  Surgeon: Rell Green MD;  Location: [facility] OR     HYSTERECTOMY, SMITA      one ovary      INCISE FINGER TENDON SHEATH      right middle finger     SURGICAL HISTORY OF -   06/2008    pubovaginal sling     SURGICAL HISTORY OF -   2006    left knee replacement     SURGICAL HISTORY OF -   2006    right thumb joint replacement     SURGICAL HISTORY OF -   01/2009    left thumb joint replacement     SURGICAL HISTORY OF -   11/04/2013    resection of urachal mass, parital cystectomy sigmoid colectomy. 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     SURGICAL HISTORY OF -   11/2013    cystoscopic removal of bladdermass prior to surgery on 11/4/13     SURGICAL HISTORY OF -   04/2015    Moh's for basal cell ca on foreheard     Current Outpatient Medications   Medication Sig Dispense Refill     albuterol (PROAIR HFA/PROVENTIL HFA/VENTOLIN HFA) 108 (90 Base) MCG/ACT inhaler  Inhale 2 puffs into the lungs every 2 hours as needed for shortness of breath / dyspnea 1 Inhaler 3     amLODIPine (NORVASC) 10 MG tablet Take 1 tablet (10 mg) by mouth daily 90 tablet 0     aspirin 81 MG tablet Take 1 tablet by mouth daily. 100 tablet 3     atenolol (TENORMIN) 50 MG tablet Take 1 tablet (50 mg) by mouth daily 90 tablet 1     budesonide-formoterol (SYMBICORT) 160-4.5 MCG/ACT Inhaler Inhale 2 puffs into the lungs 2 times daily       COMPOUNDED NON-CONTROLLED SUBSTANCE (CMPD RX) - PHARMACY TO MIX COMPOUNDED MEDICATION Estradiol 0.02% cream (contains 0.2mg estradiol/gm of cream)- apply 1 gram daily to the vaginal area for one week then 1g 2-3 times weekly 30 g 11     Folic Acid-Vit B6-Vit B12 2.5-25-1 MG TABS Take 1 tablet by mouth daily 90 tablet 3     hydrochlorothiazide (HYDRODIURIL) 25 MG tablet Take 1 tablet (25 mg) by mouth daily 90 tablet 0     ibuprofen (ADVIL/MOTRIN) 200 MG tablet Take 200 mg by mouth every 4 hours as needed for mild pain       ketoconazole (NIZORAL) 2 % external cream APPLY EXTERNALLY TO THE AFFECTED AREA DAILY AS NEEDED 30 g 0     potassium chloride ER (KLOR-CON M) 20 MEQ CR tablet Take 1 tablet (20 mEq) by mouth daily 90 tablet 1     PREMARIN cream Place 1 g vaginally twice a week 30 g 4     rosuvastatin (CRESTOR) 10 MG tablet Take 1 tablet (10 mg) by mouth daily 90 tablet 3     TYLENOL 325 MG OR TABS ONE TO TWO TABLETS EVERY 4 TO 6 HOURS AS NEEDED FOR PAIN 100 0     valACYclovir (VALTREX) 1000 mg tablet TAKE 2 TABLETS BY MOUTH TWICE DAILY (Patient taking differently: TAKE 2 TABLETS BY MOUTH TWICE DAILY PRN) 12 tablet 1     VITAMIN D PO Take by mouth daily         Allergies   Allergen Reactions     Advicor Hives     Is a combination medication of niacin and lovastatin     Meperidine Hcl Hives     Methocarbamol Hives     Neomycin-Polymyxin-Dexameth Other (See Comments)        Social History     Tobacco Use     Smoking status: Never Smoker     Smokeless tobacco: Never Used  "  Substance Use Topics     Alcohol use: Yes     Comment: social; maybe 1 per week     Family History   Problem Relation Age of Onset     C.A.D. Father         under age 55     Diabetes Father      Hypertension Father      Cerebrovascular Disease Father      Neurologic Disorder Father         dementia; Parkinson's     Obesity Father      Diabetes Mother      Hypertension Mother      Cerebrovascular Disease Mother      Circulatory Mother         ITP     Gastrointestinal Disease Mother         severe diverticulitis     Obesity Mother      Heart Disease Mother         paroxysmal a fib     Arthritis Mother      Allergies Mother      Blood Disease Mother      Breast Cancer Mother         age 89     Lipids Sister      Obesity Sister      Arthritis Sister      Breast Cancer Maternal Aunt      Breast Cancer Maternal Aunt      History   Drug Use No         Objective     /78 (BP Location: Right arm, Patient Position: Sitting, Cuff Size: Adult Large)   Pulse 54   Temp 98  F (36.7  C) (Oral)   Resp 20   Ht 1.619 m (5' 3.75\")   Wt 105.4 kg (232 lb 6.4 oz)   SpO2 98%   BMI 40.20 kg/m        Physical Exam    GENERAL APPEARANCE: healthy, alert and no distress     EYES: EOMI, PERRL     HENT: ear canals and TM's normal and nose and mouth without ulcers or lesions     NECK: no adenopathy, no asymmetry, masses, or scars and thyroid normal to palpation     RESP: lungs clear to auscultation - no rales, rhonchi or wheezes     CV: regular rates and rhythm, normal S1 S2, no S3 or S4 and no murmur, click or rub     ABDOMEN:  soft, nontender, no HSM or masses and bowel sounds normal     MS: extremities normal- no gross deformities noted, no evidence of inflammation in joints, FROM in all extremities.     SKIN: no suspicious lesions or rashes     NEURO: Normal strength and tone, sensory exam grossly normal, mentation intact and speech normal     PSYCH: mentation appears normal. and affect normal/bright     LYMPHATICS: No cervical " adenopathy    Recent Labs   Lab Test 11/05/20  1342 11/05/20  1213 08/20/20 07/29/20  0000 07/29/20   HGB  --  13.2  --   --  12.9   PLT  --  222  --   --  241   .1  --  135.9   < >  --    POTASSIUM 4.02  --  3.84   < >  --    CR 0.81  --  0.90   < >  --     < > = values in this interval not displayed.        Diagnostics:  HGB :13.4 gm/dl  Potassium: 4.19  No EKG/ done  7/2020 in [facility]    Revised Cardiac Risk Index (RCRI):  The patient has the following serious cardiovascular risks for perioperative complications:   - High risk surgery (>5% cardiac complication risk) = 1 point     RCRI Interpretation: 1 point: Class II (low risk - 0.9% complication rate)    Factor V leiden/ needs Lovenox post surgery     Assessment & Plan   The proposed surgical procedure is considered INTERMEDIATE risk.    Primary osteoarthritis of right knee    - HEMOGLOBIN (BFP)  - VENOUS COLLECTION    Pre-op exam    - HEMOGLOBIN (BFP)  - VENOUS COLLECTION    Factor V Leiden mutation (H)  Needs Lovenox post op for prevention of DVT    Moderate asthma with acute exacerbation, unspecified whether persistent      Obstructive sleep apnea syndrome  Bring CPAP to hospital      Risks and Recommendations:  The patient has the following additional risks and recommendations for perioperative complications:   - Morbid obesity (BMI >40)  Obstructive Sleep Apnea:   DVT risk  Anemia/Bleeding/Clotting:    - History of DVT or PE, consider DVT prevention postoperatively    Medication Instructions:   - aspirin: Discontinue aspirin 7-10 days prior to procedure to reduce bleeding risk. It should be resumed postoperatively.     RECOMMENDATION:  APPROVAL GIVEN to proceed with proposed procedure, without further diagnostic evaluation.    Signed Electronically by: Lissa Brasher MD    Copy of this evaluation report is provided to requesting physician.

## 2021-01-15 ENCOUNTER — HEALTH MAINTENANCE LETTER (OUTPATIENT)
Age: 71
End: 2021-01-15

## 2021-01-22 ENCOUNTER — OFFICE VISIT (OUTPATIENT)
Dept: LAB | Facility: CLINIC | Age: 71
End: 2021-01-22
Attending: ORTHOPAEDIC SURGERY
Payer: MEDICARE

## 2021-01-22 DIAGNOSIS — Z11.59 ENCOUNTER FOR SCREENING FOR OTHER VIRAL DISEASES: ICD-10-CM

## 2021-01-22 LAB
SARS-COV-2 RNA RESP QL NAA+PROBE: NORMAL
SPECIMEN SOURCE: NORMAL

## 2021-01-22 PROCEDURE — U0003 INFECTIOUS AGENT DETECTION BY NUCLEIC ACID (DNA OR RNA); SEVERE ACUTE RESPIRATORY SYNDROME CORONAVIRUS 2 (SARS-COV-2) (CORONAVIRUS DISEASE [COVID-19]), AMPLIFIED PROBE TECHNIQUE, MAKING USE OF HIGH THROUGHPUT TECHNOLOGIES AS DESCRIBED BY CMS-2020-01-R: HCPCS | Performed by: ORTHOPAEDIC SURGERY

## 2021-01-22 PROCEDURE — U0005 INFEC AGEN DETEC AMPLI PROBE: HCPCS | Performed by: ORTHOPAEDIC SURGERY

## 2021-01-23 LAB
LABORATORY COMMENT REPORT: NORMAL
SARS-COV-2 RNA RESP QL NAA+PROBE: NEGATIVE
SPECIMEN SOURCE: NORMAL

## 2021-01-24 NOTE — PHARMACY-ADMISSION MEDICATION HISTORY
PTA meds completed by pre-admitting nurse Swati Garcia and reviewed by pharmacy.    Prior to Admission medications    Medication Sig Last Dose Taking? Auth Provider   albuterol (PROAIR HFA/PROVENTIL HFA/VENTOLIN HFA) 108 (90 Base) MCG/ACT inhaler Inhale 2 puffs into the lungs every 2 hours as needed for shortness of breath / dyspnea  Yes Gonzales Mon MD   amLODIPine (NORVASC) 10 MG tablet Take 1 tablet (10 mg) by mouth daily  Yes Lissa Brasher MD   aspirin 81 MG tablet Take 1 tablet by mouth daily.  Yes Glen Stephen PA-C   atenolol (TENORMIN) 50 MG tablet Take 1 tablet (50 mg) by mouth daily  Yes Lissa Brasher MD   COMPOUNDED NON-CONTROLLED SUBSTANCE (CMPD RX) - PHARMACY TO MIX COMPOUNDED MEDICATION Estradiol 0.02% cream (contains 0.2mg estradiol/gm of cream)- apply 1 gram daily to the vaginal area for one week then 1g 2-3 times weekly  Yes Jacqui Landers PA   Folic Acid-Vit B6-Vit B12 2.5-25-1 MG TABS Take 1 tablet by mouth daily  Yes Lissa Brasher MD   hydrochlorothiazide (HYDRODIURIL) 25 MG tablet Take 1 tablet (25 mg) by mouth daily  Yes Lissa Brasher MD   ibuprofen (ADVIL/MOTRIN) 200 MG tablet Take 200 mg by mouth every 4 hours as needed for mild pain  Yes Reported, Patient   potassium chloride ER (KLOR-CON M) 20 MEQ CR tablet Take 1 tablet (20 mEq) by mouth daily  Yes Lissa Brasher MD   PREMARIN cream Place 1 g vaginally twice a week  Yes Renee Horta PA-C   rosuvastatin (CRESTOR) 10 MG tablet Take 1 tablet (10 mg) by mouth daily  Yes Lissa Brasher MD   TYLENOL 325 MG OR TABS ONE TO TWO TABLETS EVERY 4 TO 6 HOURS AS NEEDED FOR PAIN  Yes Claire Copeland MD   valACYclovir (VALTREX) 1000 mg tablet TAKE 2 TABLETS BY MOUTH TWICE DAILY  Patient taking differently: TAKE 2 TABLETS BY MOUTH TWICE DAILY PRN  Yes Jacqui Landers PA   VITAMIN D PO Take by mouth daily  Yes Reported, Patient   budesonide-formoterol (SYMBICORT) 160-4.5 MCG/ACT Inhaler  Inhale 2 puffs into the lungs 2 times daily   Lissa Brasher MD   ketoconazole (NIZORAL) 2 % external cream APPLY EXTERNALLY TO THE AFFECTED AREA DAILY AS NEEDED   Lissa Brasher MD

## 2021-01-25 ENCOUNTER — TELEPHONE (OUTPATIENT)
Dept: FAMILY MEDICINE | Facility: CLINIC | Age: 71
End: 2021-01-25

## 2021-01-25 DIAGNOSIS — D68.51 FACTOR V LEIDEN MUTATION (H): Primary | ICD-10-CM

## 2021-01-25 NOTE — TELEPHONE ENCOUNTER
"Christa ALMARAZ from [facility] called asking for advise in regards to pt's Lovenox injections. She read your pre-op and noticed pt has been given Lovenox injections in the past. Pt could not remember how long she was on them for in the past. Christa is wondering how long pt should give herself injections after her surgery tomorrow.      Your previous note states,\" Copied pervious hematology evaluation/ asked her to take this to orthopedic specialists to discuss Lovenox with surgery.\"    Please advise and I can call Christa # [phone]    ThanksAnay  "

## 2021-01-25 NOTE — TELEPHONE ENCOUNTER
I gave patient the previous hematology evaluation to discuss Lovenox coverage after surgery and to discuss with her surgeon. The Lovenox needs to be continued until the patient's limb is active and no longer at risk for a DVT. I leave that to the surgeon to decide the timing.

## 2021-01-26 ENCOUNTER — ANESTHESIA (OUTPATIENT)
Dept: SURGERY | Facility: CLINIC | Age: 71
End: 2021-01-26
Payer: MEDICARE

## 2021-01-26 ENCOUNTER — ANESTHESIA EVENT (OUTPATIENT)
Dept: SURGERY | Facility: CLINIC | Age: 71
End: 2021-01-26
Payer: MEDICARE

## 2021-01-26 ENCOUNTER — APPOINTMENT (OUTPATIENT)
Dept: PHYSICAL THERAPY | Facility: CLINIC | Age: 71
End: 2021-01-26
Attending: ORTHOPAEDIC SURGERY
Payer: MEDICARE

## 2021-01-26 ENCOUNTER — HOSPITAL ENCOUNTER (OUTPATIENT)
Facility: CLINIC | Age: 71
Discharge: HOME | End: 2021-01-27
Attending: ORTHOPAEDIC SURGERY | Admitting: ORTHOPAEDIC SURGERY
Payer: MEDICARE

## 2021-01-26 DIAGNOSIS — M17.11 ARTHRITIS OF KNEE, RIGHT: Primary | ICD-10-CM

## 2021-01-26 PROCEDURE — 258N000003 HC RX IP 258 OP 636: Performed by: ORTHOPAEDIC SURGERY

## 2021-01-26 PROCEDURE — 250N000009 HC RX 250: Performed by: NURSE ANESTHETIST, CERTIFIED REGISTERED

## 2021-01-26 PROCEDURE — 278N000051 HC OR IMPLANT GENERAL: Performed by: ORTHOPAEDIC SURGERY

## 2021-01-26 PROCEDURE — 250N000009 HC RX 250: Performed by: ORTHOPAEDIC SURGERY

## 2021-01-26 PROCEDURE — 250N000011 HC RX IP 250 OP 636: Performed by: ORTHOPAEDIC SURGERY

## 2021-01-26 PROCEDURE — C1776 JOINT DEVICE (IMPLANTABLE): HCPCS | Performed by: ORTHOPAEDIC SURGERY

## 2021-01-26 PROCEDURE — 360N000077 HC SURGERY LEVEL 4, PER MIN: Performed by: ORTHOPAEDIC SURGERY

## 2021-01-26 PROCEDURE — 250N000013 HC RX MED GY IP 250 OP 250 PS 637: Mod: GY | Performed by: ORTHOPAEDIC SURGERY

## 2021-01-26 PROCEDURE — 999N000141 HC STATISTIC PRE-PROCEDURE NURSING ASSESSMENT: Performed by: ORTHOPAEDIC SURGERY

## 2021-01-26 PROCEDURE — 97116 GAIT TRAINING THERAPY: CPT | Mod: GP | Performed by: PHYSICAL THERAPIST

## 2021-01-26 PROCEDURE — 271N000001 HC OR GENERAL SUPPLY NON-STERILE: Performed by: ORTHOPAEDIC SURGERY

## 2021-01-26 PROCEDURE — 250N000011 HC RX IP 250 OP 636: Performed by: PHYSICIAN ASSISTANT

## 2021-01-26 PROCEDURE — 272N000001 HC OR GENERAL SUPPLY STERILE: Performed by: ORTHOPAEDIC SURGERY

## 2021-01-26 PROCEDURE — 258N000003 HC RX IP 258 OP 636: Performed by: PHYSICIAN ASSISTANT

## 2021-01-26 PROCEDURE — 370N000017 HC ANESTHESIA TECHNICAL FEE, PER MIN: Performed by: ORTHOPAEDIC SURGERY

## 2021-01-26 PROCEDURE — 710N000009 HC RECOVERY PHASE 1, LEVEL 1, PER MIN: Performed by: ORTHOPAEDIC SURGERY

## 2021-01-26 PROCEDURE — 250N000011 HC RX IP 250 OP 636: Performed by: ANESTHESIOLOGY

## 2021-01-26 PROCEDURE — 250N000013 HC RX MED GY IP 250 OP 250 PS 637: Performed by: PHYSICIAN ASSISTANT

## 2021-01-26 PROCEDURE — 258N000001 HC RX 258: Performed by: ORTHOPAEDIC SURGERY

## 2021-01-26 PROCEDURE — 97530 THERAPEUTIC ACTIVITIES: CPT | Mod: GP | Performed by: PHYSICAL THERAPIST

## 2021-01-26 PROCEDURE — 93010 ELECTROCARDIOGRAM REPORT: CPT | Performed by: INTERNAL MEDICINE

## 2021-01-26 PROCEDURE — 97161 PT EVAL LOW COMPLEX 20 MIN: CPT | Mod: GP | Performed by: PHYSICAL THERAPIST

## 2021-01-26 PROCEDURE — 258N000003 HC RX IP 258 OP 636: Performed by: ANESTHESIOLOGY

## 2021-01-26 PROCEDURE — 250N000011 HC RX IP 250 OP 636: Performed by: NURSE ANESTHETIST, CERTIFIED REGISTERED

## 2021-01-26 DEVICE — IMP INSERT BASEPLATE TIBIAL HOWM TRI 4 5521-B-400: Type: IMPLANTABLE DEVICE | Site: KNEE | Status: FUNCTIONAL

## 2021-01-26 DEVICE — IMP COMP FEM STRK TRIATHLN PS RT 4 5515-F-402: Type: IMPLANTABLE DEVICE | Site: KNEE | Status: FUNCTIONAL

## 2021-01-26 DEVICE — IMP INSERT TIBIAL HOWM TRI SIZE 4 11MM 5532-G-411: Type: IMPLANTABLE DEVICE | Site: KNEE | Status: FUNCTIONAL

## 2021-01-26 DEVICE — IMP COMP PATELLA HOWM TRI ASYM 29X09MM 555-L-299: Type: IMPLANTABLE DEVICE | Site: KNEE | Status: FUNCTIONAL

## 2021-01-26 DEVICE — BONE CEMENT STRK SIMPLEX P SPEEDSET 6192-1-001: Type: IMPLANTABLE DEVICE | Site: KNEE | Status: FUNCTIONAL

## 2021-01-26 DEVICE — IMP PEG DISTAL FEMORAL STRK 5575-X-000: Type: IMPLANTABLE DEVICE | Site: KNEE | Status: FUNCTIONAL

## 2021-01-26 RX ORDER — AMLODIPINE BESYLATE 10 MG/1
10 TABLET ORAL DAILY
Status: DISCONTINUED | OUTPATIENT
Start: 2021-01-26 | End: 2021-01-27 | Stop reason: HOSPADM

## 2021-01-26 RX ORDER — ACETAMINOPHEN 325 MG/1
650 TABLET ORAL EVERY 4 HOURS PRN
Qty: 100 TABLET | Refills: 0 | Status: CANCELLED | OUTPATIENT
Start: 2021-01-26

## 2021-01-26 RX ORDER — POLYETHYLENE GLYCOL 3350 17 G/17G
17 POWDER, FOR SOLUTION ORAL DAILY
Status: CANCELLED | OUTPATIENT
Start: 2021-01-27

## 2021-01-26 RX ORDER — IBUPROFEN 600 MG/1
600 TABLET, FILM COATED ORAL EVERY 6 HOURS PRN
Status: DISCONTINUED | OUTPATIENT
Start: 2021-01-26 | End: 2021-01-27 | Stop reason: HOSPADM

## 2021-01-26 RX ORDER — HYDROCHLOROTHIAZIDE 25 MG/1
25 TABLET ORAL DAILY
Status: DISCONTINUED | OUTPATIENT
Start: 2021-01-27 | End: 2021-01-27 | Stop reason: HOSPADM

## 2021-01-26 RX ORDER — POTASSIUM CHLORIDE 1500 MG/1
20 TABLET, EXTENDED RELEASE ORAL DAILY
Status: CANCELLED | OUTPATIENT
Start: 2021-01-26

## 2021-01-26 RX ORDER — HYDROXYZINE HYDROCHLORIDE 10 MG/1
10 TABLET, FILM COATED ORAL EVERY 6 HOURS PRN
Status: CANCELLED | OUTPATIENT
Start: 2021-01-26

## 2021-01-26 RX ORDER — POTASSIUM CHLORIDE 1500 MG/1
20 TABLET, EXTENDED RELEASE ORAL DAILY
Status: DISCONTINUED | OUTPATIENT
Start: 2021-01-26 | End: 2021-01-27 | Stop reason: HOSPADM

## 2021-01-26 RX ORDER — KETOROLAC TROMETHAMINE 15 MG/ML
15 INJECTION, SOLUTION INTRAMUSCULAR; INTRAVENOUS
Status: COMPLETED | OUTPATIENT
Start: 2021-01-26 | End: 2021-01-27

## 2021-01-26 RX ORDER — NALOXONE HYDROCHLORIDE 0.4 MG/ML
0.2 INJECTION, SOLUTION INTRAMUSCULAR; INTRAVENOUS; SUBCUTANEOUS
Status: ACTIVE | OUTPATIENT
Start: 2021-01-26 | End: 2021-01-27

## 2021-01-26 RX ORDER — AMOXICILLIN 250 MG
1-2 CAPSULE ORAL 2 TIMES DAILY
Qty: 30 TABLET | Refills: 0 | Status: CANCELLED | OUTPATIENT
Start: 2021-01-26

## 2021-01-26 RX ORDER — ONDANSETRON 4 MG/1
4 TABLET, ORALLY DISINTEGRATING ORAL EVERY 30 MIN PRN
Status: DISCONTINUED | OUTPATIENT
Start: 2021-01-26 | End: 2021-01-26 | Stop reason: HOSPADM

## 2021-01-26 RX ORDER — FENTANYL CITRATE 50 UG/ML
25-50 INJECTION, SOLUTION INTRAMUSCULAR; INTRAVENOUS
Status: DISCONTINUED | OUTPATIENT
Start: 2021-01-26 | End: 2021-01-26

## 2021-01-26 RX ORDER — TRAMADOL HYDROCHLORIDE 50 MG/1
50 TABLET ORAL EVERY 6 HOURS PRN
Qty: 30 TABLET | Refills: 0 | Status: CANCELLED | OUTPATIENT
Start: 2021-01-26

## 2021-01-26 RX ORDER — CEFAZOLIN SODIUM 1 G/3ML
1 INJECTION, POWDER, FOR SOLUTION INTRAMUSCULAR; INTRAVENOUS SEE ADMIN INSTRUCTIONS
Status: DISCONTINUED | OUTPATIENT
Start: 2021-01-26 | End: 2021-01-26 | Stop reason: HOSPADM

## 2021-01-26 RX ORDER — TRAMADOL HYDROCHLORIDE 50 MG/1
50 TABLET ORAL EVERY 6 HOURS PRN
Qty: 20 TABLET | Refills: 0 | Status: SHIPPED | OUTPATIENT
Start: 2021-01-26 | End: 2021-04-07

## 2021-01-26 RX ORDER — ONDANSETRON 2 MG/ML
INJECTION INTRAMUSCULAR; INTRAVENOUS PRN
Status: DISCONTINUED | OUTPATIENT
Start: 2021-01-26 | End: 2021-01-26

## 2021-01-26 RX ORDER — CEFAZOLIN SODIUM 2 G/100ML
2 INJECTION, SOLUTION INTRAVENOUS EVERY 8 HOURS
Status: CANCELLED | OUTPATIENT
Start: 2021-01-26 | End: 2021-01-27

## 2021-01-26 RX ORDER — ONDANSETRON 4 MG/1
4 TABLET, ORALLY DISINTEGRATING ORAL EVERY 30 MIN PRN
Status: DISCONTINUED | OUTPATIENT
Start: 2021-01-26 | End: 2021-01-26

## 2021-01-26 RX ORDER — IBUPROFEN 200 MG
200 TABLET ORAL EVERY 4 HOURS PRN
Status: DISCONTINUED | OUTPATIENT
Start: 2021-01-26 | End: 2021-01-26

## 2021-01-26 RX ORDER — LIDOCAINE 40 MG/G
CREAM TOPICAL
Status: DISCONTINUED | OUTPATIENT
Start: 2021-01-26 | End: 2021-01-26 | Stop reason: HOSPADM

## 2021-01-26 RX ORDER — METOCLOPRAMIDE 5 MG/1
5 TABLET ORAL EVERY 6 HOURS PRN
Status: DISCONTINUED | OUTPATIENT
Start: 2021-01-26 | End: 2021-01-27 | Stop reason: HOSPADM

## 2021-01-26 RX ORDER — ALBUTEROL SULFATE 90 UG/1
2 AEROSOL, METERED RESPIRATORY (INHALATION)
Status: CANCELLED | OUTPATIENT
Start: 2021-01-26

## 2021-01-26 RX ORDER — HYDROMORPHONE HYDROCHLORIDE 1 MG/ML
.3-.5 INJECTION, SOLUTION INTRAMUSCULAR; INTRAVENOUS; SUBCUTANEOUS EVERY 5 MIN PRN
Status: DISCONTINUED | OUTPATIENT
Start: 2021-01-26 | End: 2021-01-26 | Stop reason: HOSPADM

## 2021-01-26 RX ORDER — LIDOCAINE 40 MG/G
CREAM TOPICAL
Status: DISCONTINUED | OUTPATIENT
Start: 2021-01-26 | End: 2021-01-27 | Stop reason: HOSPADM

## 2021-01-26 RX ORDER — DEXAMETHASONE SODIUM PHOSPHATE 4 MG/ML
INJECTION, SOLUTION INTRA-ARTICULAR; INTRALESIONAL; INTRAMUSCULAR; INTRAVENOUS; SOFT TISSUE PRN
Status: DISCONTINUED | OUTPATIENT
Start: 2021-01-26 | End: 2021-01-26

## 2021-01-26 RX ORDER — ONDANSETRON 2 MG/ML
4 INJECTION INTRAMUSCULAR; INTRAVENOUS EVERY 6 HOURS PRN
Status: CANCELLED | OUTPATIENT
Start: 2021-01-26

## 2021-01-26 RX ORDER — SODIUM CHLORIDE, SODIUM LACTATE, POTASSIUM CHLORIDE, CALCIUM CHLORIDE 600; 310; 30; 20 MG/100ML; MG/100ML; MG/100ML; MG/100ML
INJECTION, SOLUTION INTRAVENOUS CONTINUOUS
Status: DISCONTINUED | OUTPATIENT
Start: 2021-01-26 | End: 2021-01-26 | Stop reason: HOSPADM

## 2021-01-26 RX ORDER — GLYCOPYRROLATE 0.2 MG/ML
INJECTION, SOLUTION INTRAMUSCULAR; INTRAVENOUS PRN
Status: DISCONTINUED | OUTPATIENT
Start: 2021-01-26 | End: 2021-01-26

## 2021-01-26 RX ORDER — LIDOCAINE HYDROCHLORIDE 10 MG/ML
INJECTION, SOLUTION INFILTRATION; PERINEURAL PRN
Status: DISCONTINUED | OUTPATIENT
Start: 2021-01-26 | End: 2021-01-26

## 2021-01-26 RX ORDER — CELECOXIB 200 MG/1
400 CAPSULE ORAL ONCE
Status: COMPLETED | OUTPATIENT
Start: 2021-01-26 | End: 2021-01-26

## 2021-01-26 RX ORDER — SODIUM CHLORIDE, SODIUM LACTATE, POTASSIUM CHLORIDE, CALCIUM CHLORIDE 600; 310; 30; 20 MG/100ML; MG/100ML; MG/100ML; MG/100ML
INJECTION, SOLUTION INTRAVENOUS CONTINUOUS
Status: CANCELLED | OUTPATIENT
Start: 2021-01-26

## 2021-01-26 RX ORDER — HYDROXYZINE HYDROCHLORIDE 10 MG/1
10 TABLET, FILM COATED ORAL EVERY 6 HOURS PRN
Qty: 30 TABLET | Refills: 0 | Status: CANCELLED | OUTPATIENT
Start: 2021-01-26

## 2021-01-26 RX ORDER — CELECOXIB 200 MG/1
200 CAPSULE ORAL DAILY
Qty: 14 CAPSULE | Refills: 0 | Status: CANCELLED | OUTPATIENT
Start: 2021-01-26 | End: 2021-02-09

## 2021-01-26 RX ORDER — PROCHLORPERAZINE MALEATE 5 MG
5 TABLET ORAL EVERY 6 HOURS PRN
Status: CANCELLED | OUTPATIENT
Start: 2021-01-26

## 2021-01-26 RX ORDER — ONDANSETRON 2 MG/ML
4 INJECTION INTRAMUSCULAR; INTRAVENOUS EVERY 30 MIN PRN
Status: DISCONTINUED | OUTPATIENT
Start: 2021-01-26 | End: 2021-01-26

## 2021-01-26 RX ORDER — ONDANSETRON 2 MG/ML
4 INJECTION INTRAMUSCULAR; INTRAVENOUS EVERY 30 MIN PRN
Status: DISCONTINUED | OUTPATIENT
Start: 2021-01-26 | End: 2021-01-26 | Stop reason: HOSPADM

## 2021-01-26 RX ORDER — AMOXICILLIN 250 MG
1-2 CAPSULE ORAL 2 TIMES DAILY
Qty: 30 TABLET | Refills: 0 | Status: SHIPPED | OUTPATIENT
Start: 2021-01-26 | End: 2021-04-07

## 2021-01-26 RX ORDER — KETOROLAC TROMETHAMINE 30 MG/ML
15 INJECTION, SOLUTION INTRAMUSCULAR; INTRAVENOUS
Status: DISCONTINUED | OUTPATIENT
Start: 2021-01-26 | End: 2021-01-26

## 2021-01-26 RX ORDER — AMOXICILLIN 250 MG
1 CAPSULE ORAL 2 TIMES DAILY
Status: CANCELLED | OUTPATIENT
Start: 2021-01-26

## 2021-01-26 RX ORDER — ONDANSETRON 4 MG/1
4 TABLET, ORALLY DISINTEGRATING ORAL EVERY 6 HOURS PRN
Status: CANCELLED | OUTPATIENT
Start: 2021-01-26

## 2021-01-26 RX ORDER — PROPOFOL 10 MG/ML
INJECTION, EMULSION INTRAVENOUS CONTINUOUS PRN
Status: DISCONTINUED | OUTPATIENT
Start: 2021-01-26 | End: 2021-01-26

## 2021-01-26 RX ORDER — ONDANSETRON 4 MG/1
4 TABLET, ORALLY DISINTEGRATING ORAL EVERY 6 HOURS PRN
Status: DISCONTINUED | OUTPATIENT
Start: 2021-01-26 | End: 2021-01-27 | Stop reason: HOSPADM

## 2021-01-26 RX ORDER — ONDANSETRON 2 MG/ML
4 INJECTION INTRAMUSCULAR; INTRAVENOUS EVERY 6 HOURS PRN
Status: DISCONTINUED | OUTPATIENT
Start: 2021-01-26 | End: 2021-01-27 | Stop reason: HOSPADM

## 2021-01-26 RX ORDER — LIDOCAINE 40 MG/G
CREAM TOPICAL
Status: CANCELLED | OUTPATIENT
Start: 2021-01-26

## 2021-01-26 RX ORDER — BUDESONIDE AND FORMOTEROL FUMARATE DIHYDRATE 160; 4.5 UG/1; UG/1
2 AEROSOL RESPIRATORY (INHALATION) 2 TIMES DAILY
Status: CANCELLED | OUTPATIENT
Start: 2021-01-26

## 2021-01-26 RX ORDER — ROSUVASTATIN CALCIUM 5 MG/1
10 TABLET, COATED ORAL DAILY
Status: CANCELLED | OUTPATIENT
Start: 2021-01-26

## 2021-01-26 RX ORDER — ATENOLOL 50 MG/1
50 TABLET ORAL DAILY
Status: DISCONTINUED | OUTPATIENT
Start: 2021-01-26 | End: 2021-01-27 | Stop reason: HOSPADM

## 2021-01-26 RX ORDER — METOPROLOL TARTRATE 1 MG/ML
1-2 INJECTION, SOLUTION INTRAVENOUS EVERY 5 MIN PRN
Status: DISCONTINUED | OUTPATIENT
Start: 2021-01-26 | End: 2021-01-27 | Stop reason: HOSPADM

## 2021-01-26 RX ORDER — NALOXONE HYDROCHLORIDE 0.4 MG/ML
0.4 INJECTION, SOLUTION INTRAMUSCULAR; INTRAVENOUS; SUBCUTANEOUS
Status: ACTIVE | OUTPATIENT
Start: 2021-01-26 | End: 2021-01-27

## 2021-01-26 RX ORDER — HYDROXYZINE HYDROCHLORIDE 25 MG/1
25 TABLET, FILM COATED ORAL EVERY 6 HOURS PRN
Qty: 30 TABLET | Refills: 0 | Status: SHIPPED | OUTPATIENT
Start: 2021-01-26 | End: 2021-11-16

## 2021-01-26 RX ORDER — VANCOMYCIN HYDROCHLORIDE 1 G/20ML
INJECTION, POWDER, LYOPHILIZED, FOR SOLUTION INTRAVENOUS PRN
Status: DISCONTINUED | OUTPATIENT
Start: 2021-01-26 | End: 2021-01-26 | Stop reason: HOSPADM

## 2021-01-26 RX ORDER — DOCUSATE SODIUM 100 MG/1
100 CAPSULE, LIQUID FILLED ORAL 2 TIMES DAILY
Status: CANCELLED | OUTPATIENT
Start: 2021-01-26

## 2021-01-26 RX ORDER — FENTANYL CITRATE 50 UG/ML
25-50 INJECTION, SOLUTION INTRAMUSCULAR; INTRAVENOUS
Status: DISCONTINUED | OUTPATIENT
Start: 2021-01-26 | End: 2021-01-26 | Stop reason: HOSPADM

## 2021-01-26 RX ORDER — KETAMINE HYDROCHLORIDE 10 MG/ML
INJECTION INTRAMUSCULAR; INTRAVENOUS PRN
Status: DISCONTINUED | OUTPATIENT
Start: 2021-01-26 | End: 2021-01-26

## 2021-01-26 RX ORDER — HYDROXYZINE HYDROCHLORIDE 25 MG/1
25 TABLET, FILM COATED ORAL EVERY 6 HOURS PRN
Status: DISCONTINUED | OUTPATIENT
Start: 2021-01-26 | End: 2021-01-27 | Stop reason: HOSPADM

## 2021-01-26 RX ORDER — ACETAMINOPHEN 325 MG/1
975 TABLET ORAL ONCE
Status: DISCONTINUED | OUTPATIENT
Start: 2021-01-26 | End: 2021-01-26 | Stop reason: HOSPADM

## 2021-01-26 RX ORDER — BISACODYL 10 MG
10 SUPPOSITORY, RECTAL RECTAL DAILY PRN
Status: CANCELLED | OUTPATIENT
Start: 2021-01-26

## 2021-01-26 RX ORDER — PROPOFOL 10 MG/ML
INJECTION, EMULSION INTRAVENOUS PRN
Status: DISCONTINUED | OUTPATIENT
Start: 2021-01-26 | End: 2021-01-26

## 2021-01-26 RX ORDER — ACETAMINOPHEN 325 MG/1
650 TABLET ORAL EVERY 6 HOURS PRN
Status: DISCONTINUED | OUTPATIENT
Start: 2021-01-26 | End: 2021-01-27 | Stop reason: HOSPADM

## 2021-01-26 RX ORDER — ATENOLOL 50 MG/1
50 TABLET ORAL DAILY
Status: CANCELLED | OUTPATIENT
Start: 2021-01-26

## 2021-01-26 RX ORDER — AMLODIPINE BESYLATE 10 MG/1
10 TABLET ORAL DAILY
Status: CANCELLED | OUTPATIENT
Start: 2021-01-26

## 2021-01-26 RX ORDER — ALBUTEROL SULFATE 90 UG/1
2 AEROSOL, METERED RESPIRATORY (INHALATION)
Status: DISCONTINUED | OUTPATIENT
Start: 2021-01-26 | End: 2021-01-27 | Stop reason: HOSPADM

## 2021-01-26 RX ORDER — SODIUM CHLORIDE, SODIUM LACTATE, POTASSIUM CHLORIDE, CALCIUM CHLORIDE 600; 310; 30; 20 MG/100ML; MG/100ML; MG/100ML; MG/100ML
INJECTION, SOLUTION INTRAVENOUS CONTINUOUS
Status: DISCONTINUED | OUTPATIENT
Start: 2021-01-26 | End: 2021-01-27 | Stop reason: HOSPADM

## 2021-01-26 RX ORDER — ACETAMINOPHEN 325 MG/1
325-650 TABLET ORAL EVERY 6 HOURS PRN
Status: DISCONTINUED | OUTPATIENT
Start: 2021-01-26 | End: 2021-01-26

## 2021-01-26 RX ORDER — TRAMADOL HYDROCHLORIDE 50 MG/1
50 TABLET ORAL EVERY 6 HOURS PRN
Status: CANCELLED | OUTPATIENT
Start: 2021-01-26

## 2021-01-26 RX ORDER — ROSUVASTATIN CALCIUM 5 MG/1
10 TABLET, COATED ORAL DAILY
Status: DISCONTINUED | OUTPATIENT
Start: 2021-01-27 | End: 2021-01-27 | Stop reason: HOSPADM

## 2021-01-26 RX ORDER — CEFAZOLIN SODIUM 1 G/50ML
1 INJECTION, SOLUTION INTRAVENOUS EVERY 8 HOURS
Status: COMPLETED | OUTPATIENT
Start: 2021-01-26 | End: 2021-01-27

## 2021-01-26 RX ORDER — CEFAZOLIN SODIUM 2 G/100ML
2 INJECTION, SOLUTION INTRAVENOUS
Status: COMPLETED | OUTPATIENT
Start: 2021-01-26 | End: 2021-01-26

## 2021-01-26 RX ORDER — TRAMADOL HYDROCHLORIDE 50 MG/1
50 TABLET ORAL EVERY 6 HOURS PRN
Status: DISCONTINUED | OUTPATIENT
Start: 2021-01-26 | End: 2021-01-27 | Stop reason: HOSPADM

## 2021-01-26 RX ORDER — CELECOXIB 100 MG/1
100 CAPSULE ORAL 2 TIMES DAILY
Status: CANCELLED | OUTPATIENT
Start: 2021-01-26 | End: 2021-01-28

## 2021-01-26 RX ORDER — HYDRALAZINE HYDROCHLORIDE 20 MG/ML
2.5-5 INJECTION INTRAMUSCULAR; INTRAVENOUS EVERY 10 MIN PRN
Status: DISCONTINUED | OUTPATIENT
Start: 2021-01-26 | End: 2021-01-27 | Stop reason: HOSPADM

## 2021-01-26 RX ORDER — HYDROCHLOROTHIAZIDE 25 MG/1
25 TABLET ORAL DAILY
Status: CANCELLED | OUTPATIENT
Start: 2021-01-26

## 2021-01-26 RX ORDER — METOCLOPRAMIDE HYDROCHLORIDE 5 MG/ML
5 INJECTION INTRAMUSCULAR; INTRAVENOUS EVERY 6 HOURS PRN
Status: DISCONTINUED | OUTPATIENT
Start: 2021-01-26 | End: 2021-01-27 | Stop reason: HOSPADM

## 2021-01-26 RX ADMIN — HYDROXYZINE HYDROCHLORIDE 25 MG: 25 TABLET, FILM COATED ORAL at 21:35

## 2021-01-26 RX ADMIN — CEFAZOLIN SODIUM 1 G: 1 INJECTION, SOLUTION INTRAVENOUS at 17:11

## 2021-01-26 RX ADMIN — PROPOFOL 50 MCG/KG/MIN: 10 INJECTION, EMULSION INTRAVENOUS at 07:51

## 2021-01-26 RX ADMIN — SODIUM CHLORIDE, POTASSIUM CHLORIDE, SODIUM LACTATE AND CALCIUM CHLORIDE: 600; 310; 30; 20 INJECTION, SOLUTION INTRAVENOUS at 09:37

## 2021-01-26 RX ADMIN — DEXAMETHASONE SODIUM PHOSPHATE 4 MG: 4 INJECTION, SOLUTION INTRA-ARTICULAR; INTRALESIONAL; INTRAMUSCULAR; INTRAVENOUS; SOFT TISSUE at 08:18

## 2021-01-26 RX ADMIN — PROPOFOL 20 MG: 10 INJECTION, EMULSION INTRAVENOUS at 07:42

## 2021-01-26 RX ADMIN — SODIUM CHLORIDE, POTASSIUM CHLORIDE, SODIUM LACTATE AND CALCIUM CHLORIDE: 600; 310; 30; 20 INJECTION, SOLUTION INTRAVENOUS at 15:26

## 2021-01-26 RX ADMIN — POTASSIUM CHLORIDE 20 MEQ: 1500 TABLET, EXTENDED RELEASE ORAL at 15:26

## 2021-01-26 RX ADMIN — FENTANYL CITRATE 50 MCG: 50 INJECTION, SOLUTION INTRAMUSCULAR; INTRAVENOUS at 11:25

## 2021-01-26 RX ADMIN — FENTANYL CITRATE 50 MCG: 50 INJECTION, SOLUTION INTRAMUSCULAR; INTRAVENOUS at 11:45

## 2021-01-26 RX ADMIN — MIDAZOLAM 2 MG: 1 INJECTION INTRAMUSCULAR; INTRAVENOUS at 07:29

## 2021-01-26 RX ADMIN — CEFAZOLIN SODIUM 2 G: 2 INJECTION, SOLUTION INTRAVENOUS at 07:33

## 2021-01-26 RX ADMIN — PROPOFOL 30 MG: 10 INJECTION, EMULSION INTRAVENOUS at 08:38

## 2021-01-26 RX ADMIN — CELECOXIB 400 MG: 200 CAPSULE ORAL at 07:29

## 2021-01-26 RX ADMIN — SODIUM CHLORIDE, POTASSIUM CHLORIDE, SODIUM LACTATE AND CALCIUM CHLORIDE: 600; 310; 30; 20 INJECTION, SOLUTION INTRAVENOUS at 07:16

## 2021-01-26 RX ADMIN — LIDOCAINE HYDROCHLORIDE 50 MG: 10 INJECTION, SOLUTION INFILTRATION; PERINEURAL at 07:39

## 2021-01-26 RX ADMIN — AMLODIPINE BESYLATE 10 MG: 10 TABLET ORAL at 15:26

## 2021-01-26 RX ADMIN — ACETAMINOPHEN 650 MG: 325 TABLET, FILM COATED ORAL at 17:11

## 2021-01-26 RX ADMIN — TRAMADOL HYDROCHLORIDE 50 MG: 50 TABLET, FILM COATED ORAL at 20:40

## 2021-01-26 RX ADMIN — ONDANSETRON HYDROCHLORIDE 4 MG: 2 INJECTION, SOLUTION INTRAVENOUS at 07:42

## 2021-01-26 RX ADMIN — GLYCOPYRROLATE 0.1 MG: 0.2 INJECTION, SOLUTION INTRAMUSCULAR; INTRAVENOUS at 09:45

## 2021-01-26 RX ADMIN — ATENOLOL 50 MG: 50 TABLET ORAL at 15:26

## 2021-01-26 RX ADMIN — PROPOFOL 30 MG: 10 INJECTION, EMULSION INTRAVENOUS at 07:48

## 2021-01-26 RX ADMIN — PROPOFOL 30 MG: 10 INJECTION, EMULSION INTRAVENOUS at 07:44

## 2021-01-26 RX ADMIN — TRAMADOL HYDROCHLORIDE 50 MG: 50 TABLET, FILM COATED ORAL at 14:19

## 2021-01-26 RX ADMIN — HYDROXYZINE HYDROCHLORIDE 25 MG: 25 TABLET, FILM COATED ORAL at 15:26

## 2021-01-26 RX ADMIN — PROPOFOL 20 MG: 10 INJECTION, EMULSION INTRAVENOUS at 07:46

## 2021-01-26 RX ADMIN — GLYCOPYRROLATE 0.1 MG: 0.2 INJECTION, SOLUTION INTRAMUSCULAR; INTRAVENOUS at 07:38

## 2021-01-26 RX ADMIN — Medication 15 MG: at 08:03

## 2021-01-26 RX ADMIN — PROPOFOL 50 MG: 10 INJECTION, EMULSION INTRAVENOUS at 10:29

## 2021-01-26 ASSESSMENT — MIFFLIN-ST. JEOR: SCORE: 1537.4

## 2021-01-26 NOTE — ANESTHESIA PROCEDURE NOTES
Airway    Patient location during procedure: OR  Staff -   CRNA: Joi Felder APRN CRNA  Performed By: CRNA    Consent for Airway   Urgency: elective    Indications and Patient Condition  Indications for airway management: marisol-procedural      Final Airway Details  Final airway type: supraglottic airway

## 2021-01-26 NOTE — ANESTHESIA PROCEDURE NOTES
Pre-Procedure   Staff -   Anesthesiologist:  Petey Melgar MD  Performed By: anesthesiologist  Location: OR  Procedure Start/Stop Times: 1/26/2021 7:49 AM

## 2021-01-26 NOTE — PROGRESS NOTES
01/26/21 1523   Quick Adds   Type of Visit Initial PT Evaluation   Living Environment   Living Environment Comments Pt lives in one level [facility]. 3 MISHEL with R railing. All needs on main level.    Self-Care   Usual Activity Tolerance moderate   Current Activity Tolerance fair   Equipment Currently Used at Home grab bar, toilet;grab bar, tub/shower;tub bench;walker, rolling;cane, straight   Disability/Function   Fall history within last six months no   General Information   Onset of Illness/Injury or Date of Surgery 01/26/21   Referring Physician Himanshu Pedro MD   Patient/Family Therapy Goals Statement (PT) To improve pain   Pertinent History of Current Problem (include personal factors and/or comorbidities that impact the POC) Pt is a 70 year old female POD0 s/p R TKA.   Existing Precautions/Restrictions fall;weight bearing   Weight-Bearing Status - LLE full weight-bearing   Weight-Bearing Status - RLE weight-bearing as tolerated   Cognition   Orientation Status (Cognition) oriented x 4   Affect/Mental Status (Cognition) WFL   Follows Commands (Cognition) WFL   Pain Assessment   Patient Currently in Pain Yes, see Vital Sign flowsheet  (7/10 R knee)   Integumentary/Edema   Integumentary/Edema Comments blood soaking through ace bandaging   Range of Motion (ROM)   ROM Comment B LE WFL except R knee   Strength   Strength Comments able to complete B SLR, functionally demosntrated >3/5 strength B   Bed Mobility   Comment (Bed Mobility) Ashlie supine <> sit   Transfers   Transfer Safety Comments CGA sit to stand with FWW   Gait/Stairs (Locomotion)   Distance in Feet (Required for LE Total Joints) 10   Deviations/Abnormal Patterns (Gait) antalgic;gait speed decreased;base of support, wide;weight shifting decreased   Maintains Weight-bearing Status (Gait) able to maintain   Balance   Balance Comments good sitting balance EOB; fair standing balance with FWW   Clinical Impression   Criteria for Skilled Therapeutic  Intervention yes, treatment indicated   PT Diagnosis (PT) impaired functional mobility   Influenced by the following impairments decreased R knee ROM; pain; impaired balance   Functional limitations due to impairments impaired bed mobility, transfers, ambulation, stairs   Clinical Presentation Stable/Uncomplicated   Clinical Presentation Rationale Pt is medically stable   Clinical Decision Making (Complexity) low complexity   Therapy Frequency (PT)   (BID)   Predicted Duration of Therapy Intervention (days/wks) 2 days   Planned Therapy Interventions (PT) balance training;bed mobility training;gait training;cryotherapy;home exercise program;patient/family education;ROM (range of motion);stair training;strengthening;transfer training   Anticipated Equipment Needs at Discharge (PT) walker, rolling   Risk & Benefits of therapy have been explained evaluation/treatment results reviewed;care plan/treatment goals reviewed;risks/benefits reviewed;current/potential barriers reviewed;participants voiced agreement with care plan;participants included;patient   PT Discharge Planning    PT Discharge Recommendation (DC Rec)   (Defer to ortho)   PT Rationale for DC Rec Anticipate that patient will be at least Ashlie with bed mobility and stairs; SBA for ambulation and transfers   PT Brief overview of current status  CGA with FWW   Total Evaluation Time   Total Evaluation Time (Minutes) 6

## 2021-01-26 NOTE — OR NURSING
Right knee block procedure was explained to pt by Dr. Melgar at the bedside, was present,questions encouraged and answered. Block performed without complications.  VSS.  Pt tolerated well.  Will continue to monitor.

## 2021-01-26 NOTE — ANESTHESIA PREPROCEDURE EVALUATION
Anesthesia Pre-Procedure Evaluation    Patient: Maria Victoria Arcos   MRN: [id number] : 1950        Preoperative Diagnosis: Osteoarthritis of right knee [M17.11]   Procedure : Procedure(s):  Right total knee arthroplasty     Past Medical History:   Diagnosis Date     Asthma      Coagulation disorder (H)     factor v Leiden     DVT of upper extremity (deep vein thrombosis) (H)      Essential hypertension, benign      Essential tremor 2018     Generalized osteoarthrosis, unspecified site 10/18/2006     Impaired fasting glucose 2010     Other and unspecified hyperlipidemia 10/18/2006     Sleep apnea     CPAP     Unspecified asthma(493.90)     mild      Past Surgical History:   Procedure Laterality Date     APPENDECTOMY       CARPAL TUNNEL RELEASE RT/LT       CHOLECYSTECTOMY, LAPOROSCOPIC      Cholecystectomy, Laparoscopic     COLONOSCOPY  2004    diverticulosis     D & C       EYE SURGERY       FOOT SURGERY Left 2020    injection     HERNIORRHAPHY VENTRAL N/A 11/15/2015    Procedure: HERNIORRHAPHY VENTRAL;  Surgeon: Rell Green MD;  Location: [facility] OR     HYSTERECTOMY, SMITA      one ovary      INCISE FINGER TENDON SHEATH      right middle finger     SURGICAL HISTORY OF -   2008    pubovaginal sling     SURGICAL HISTORY OF -       left knee replacement     SURGICAL HISTORY OF -       right thumb joint replacement     SURGICAL HISTORY OF -   2009    left thumb joint replacement     SURGICAL HISTORY OF -   2013    resection of urachal mass, parital cystectomy sigmoid colectomy. 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     SURGICAL HISTORY OF -   2013    cystoscopic removal of bladdermass prior to surgery on 13     SURGICAL HISTORY OF -   2015    Moh's for basal cell ca on foreheard      Allergies   Allergen Reactions     Advicor Hives     Is a combination medication of niacin and lovastatin     Meperidine Hcl Hives     Methocarbamol Hives     Neomycin-Polymyxin-Dexameth  Other (See Comments)      Social History     Tobacco Use     Smoking status: Never Smoker     Smokeless tobacco: Never Used   Substance Use Topics     Alcohol use: Yes     Comment: social; maybe 1 per week      Wt Readings from Last 1 Encounters:   01/26/21 104.3 kg (230 lb)        Anesthesia Evaluation            ROS/MED HX  ENT/Pulmonary:     (+) sleep apnea, Intermittent, asthma Treatment: Inhaler prn,      Neurologic: Comment: Essential tremor      Cardiovascular:     (+) hypertension-----    METS/Exercise Tolerance:     Hematologic: Comments: factor v Leiden    Lab Test        01/14/21     11/05/20     07/29/20     04/02/20     11/14/15     11/14/15                       1211          1213                                              0607          0607          WBC           --          6.2          6.5          8.4            < >        11.2*         HGB          13.4         13.2         12.9         14.3           < >        12.8          MCV           --          89.2         86.3         86.3           < >        84            PLT           --          222          241          311            < >        169           INR           --           --           --           --           --          1.04           < > = values in this interval not displayed.                  Lab Test        01/14/21     11/05/20     08/20/20     07/29/20     12/03/15     12/03/15     11/16/15     11/16/15     11/14/15                                         1342                            1401          1316          1316          0546          0546          0607          NA            --          136.1        135.9        132.4*         < >        134           --          139          137           POTASSIUM    4.19         4.02         3.84         3.99           < >        3.4            < >        3.2*         3.4           CHLORIDE      --          98           96.9*        93.7*          < >        99            --           105          103           CO2           --          32.3*        31.2         30.6           < >        28            --          29           26            BUN           --          13  16      23  25.3    22  23.2*     < >        16            --          13           18            CR            --          0.81         0.90         0.95           < >        0.88          --          0.70         0.77          ANIONGAP      --           --           --           --           --          7             --          5            8             ROXI           --          10.3         10.1         9.7            < >        8.9           --          7.7*         8.5           GLC           --          110*         110*         102*           < >        112*          --          130*         131*           < > = values in this interval not displayed.                     (+) History of blood clots, pt is anticoagulated,     Musculoskeletal:   (+) arthritis,     GI/Hepatic:     (+) GERD, Asymptomatic on medication,     Renal/Genitourinary:  - neg Renal ROS     Endo:     (+) Obesity,     Psychiatric/Substance Use:  - neg psychiatric ROS     Infectious Disease:       Malignancy:  - neg malignancy ROS     Other:            Physical Exam    Airway  airway exam normal      Mallampati: I   TM distance: > 3 FB   Neck ROM: full   Mouth opening: > 3 cm    Respiratory Devices and Support         Dental  no notable dental history         Cardiovascular   cardiovascular exam normal          Pulmonary   pulmonary exam normal                OUTSIDE LABS:  CBC:   Lab Results   Component Value Date    WBC 6.2 11/05/2020    WBC 6.5 07/29/2020    HGB 13.4 01/14/2021    HGB 13.2 11/05/2020    HCT 40.3 11/05/2020    HCT 37.1 07/29/2020     11/05/2020     07/29/2020     BMP:   Lab Results   Component Value Date    .1 11/05/2020    .9 08/20/2020    POTASSIUM 4.19 01/14/2021    POTASSIUM 4.02 11/05/2020    CHLORIDE 98  11/05/2020    CHLORIDE 96.9 (A) 08/20/2020    CO2 32.3 (A) 11/05/2020    CO2 31.2 08/20/2020    BUN 13 11/05/2020    BUN 16 11/05/2020    CR 0.81 11/05/2020    CR 0.90 08/20/2020     (A) 11/05/2020     (A) 08/20/2020     COAGS:   Lab Results   Component Value Date    PTT 27 11/14/2015    INR 1.04 11/14/2015     POC:   Lab Results   Component Value Date     (H) 11/17/2015     HEPATIC:   Lab Results   Component Value Date    ALBUMIN 4.6 11/05/2020    PROTTOTAL 6.9 11/05/2020    ALT 19 01/15/2019    AST 19 01/15/2019    ALKPHOS 36 11/05/2020    BILITOTAL 0.7 11/05/2020     OTHER:   Lab Results   Component Value Date    LACT 1.1 11/13/2015    A1C 5.7 07/09/2018    ROXI 10.3 11/05/2020    PHOS 3.7 11/14/2015    MAG 1.7 11/14/2015    LIPASE 119 11/13/2015    TSH 2.8 01/13/2015    TSH 2.8 01/13/2015    .0 (H) 11/17/2015    SED 17 11/17/2015       Anesthesia Plan     History & Physical Review      ASA Status:  3. NPO Status:  NPO Appropriate. .  Plan for General with Propofol induction. Device: LMA Maintenance will be Balanced.         PONV prophylaxis:  Ondansetron (or other 5HT-3) and Dexamethasone or Solumedrol.       Consents  Anesthesia Plan(s) and associated risks, benefits, and realistic alternatives discussed.    Questions answered and patient/representative(s) expressed understanding.    Discussed with:  Patient.       Extended Intubation/Ventilatory Support Discussed No No     Use of blood products discussed: Yes.   Discussed with: Patient.  Consented to blood products.      Postoperative Care  Postoperative pain management: IV analgesics.           Petey Melgar MD

## 2021-01-26 NOTE — ANESTHESIA POSTPROCEDURE EVALUATION
IP Occupational Therapy Treatment  Plan of Care Note    Assessment: Patient presents below baseline which was independent with ADLs . Emphasis of session included  ADL retraining, ease of process and increasing activity tolerance building. Pt continues to make steady  progress towards all short term goals. No formal goals met this session. Recommend continuation of skilled Occupational Therapy to increase strength, activity tolerance, safety awareness, and independence with activity of daily living  tasks. Pt planning discharge to skilled nursing facility post discharge from [facility] for further rehab. Discussed progression towards goals and recommendations with Supervising Registered Occupational Therapist.     OT Identified Barriers to Discharge: Limited in function by pain   Recommendations for Discharge: OT: SNF    Treatment Plan for Next Session: ADL at the sink    Below is key objective and subjective information from the last 24 hours.  For further details and goals, please refer to the OT Assess/Treat/Goals flowsheet.    Diagnosis:   1. Acute bilateral low back pain without sciatica    2. Sacroiliitis (CMS/HCC)    3. Sacroiliac joint dysfunction        Co-morbidities:   Patient Active Problem List   Diagnosis   • HTN (hypertension)   • Other and unspecified hyperlipidemia   • Acquired hypothyroidism   • Anxiety state, unspecified   • Pseudophakia of both eyes   • Examination of eyes and vision   • Spinal stenosis of lumbar region at multiple levels   • Hypokalemia          Prior Living Situation:  Type of Home: House (06/26/18 0955)  Lives With: Alone (06/26/18 0955)    ADLs:  Self Cares/ADL's  Grooming Assistance: Supervision (06/27/18 0810)  Oral Hygiene Assistance: Supervision (06/27/18 0810)  Bathing Assistance: Supervision (06/27/18 0810)  Upper Body Dressing Assistance: Supervision (06/27/18 0810)  Lower Body Dressing Deficit: Supervision/Safety (06/27/18 0810)  Toileting Assistance: Supervision (06/27/18  Patient: Maria Victoria Arcos    Procedure(s):  Right total knee arthroplasty    Diagnosis:Osteoarthritis of right knee [M17.11]  Diagnosis Additional Information: No value filed.    Anesthesia Type:  General    Note:     Postop Pain Control: Uneventful            Sign Out: Well controlled pain   PONV: No   Neuro/Psych: Uneventful            Sign Out: Acceptable/Baseline neuro status   Airway/Respiratory: Uneventful            Sign Out: Acceptable/Baseline resp. status   CV/Hemodynamics: Uneventful            Sign Out: Acceptable CV status   Other NRE: NONE   DID A NON-ROUTINE EVENT OCCUR? No         Last vitals:  Vitals:    01/26/21 1145 01/26/21 1200 01/26/21 1230   BP: 130/71 129/71 (!) 151/71   Pulse: 70 60 68   Resp: 12 10 12   Temp:   96.8  F (36  C)   SpO2: 95% 96% 97%       Electronically Signed By: Petey Melgar MD  January 26, 2021  12:53 PM   0810)  Self Cares/ADL's Comments #1: Patient completed cares in sitting and standing at the sink.  (06/27/18 0810)  Self Cares/ADL's Comments #2: verbal cues for safer hand placement (06/27/18 0810)    Household Mobility:  Household Mobility  Supine to Sit: Modified Independent (rail) (06/27/18 0810)  Sit to Stand: Supervision (06/27/18 0810)  Stand to Sit: Supervision (06/27/18 0810)  Toilet Transfers: Supervision (06/27/18 0810)  Transfer Equipment: Gait belt and 2ww (06/26/18 1059)  Sitting - Static: Independent (06/27/18 0810)  Sitting - Dynamic: Independent (06/27/18 0810)  Standing - Static: Touching/Steadying Assistance (06/27/18 0810)  Standing - Dynamic: Touching/Steadying Assistance (06/27/18 0810)  Household Mobility Comments #1: no LOB min sway (06/27/18 0810)       Goals:  Short Term Goals to Be Reviewed On: 06/29/18 (06/26/18 1059)  Short Term Goals Are The Same as Discharge Goals: Yes (06/26/18 1059)  Goal Agreement: Patient agrees with goals and treatment plan (06/26/18 1059)  Grooming Discharge Goal 1: To promote endurance, patient to tolerate 10+ minutes of sinkside activitiy in standing with no rest break (06/26/18 1059)  Grooming Discharge Goal Progress 1: Outcome not met, continue to monitor (06/27/18 0810)  Bathing Discharge Goal 1: To promote baseline function, patient to complete shower or full body bathing sinkside at modified independent/supervision  (06/26/18 1059)  Bathing Discharge Goal Progress 1: Outcome not met, continue to monitor (06/27/18 0810)  UE Function Discharge Goal 1: To promote functional strength, patient to demonstrate independence in BUE strengthening HEP  (06/26/18 1059)  UE Function Discharge Goal Progress 1: Outcome not met, continue to monitor (06/27/18 0810)    Education:   On this date, the patient was educated on  ADL retraining, ease of process and increasing activity tolerance building.    The response to education was: Demonstrates understanding and Needs  reinforcement.    Equipment:  PT/OT ADL Equipment for Discharge: Continue to assess (06/26/18 1059)  PT/OT Mobility Equipment for Discharge: has 4WW athom (06/26/18 0955)    Interventions and Treatment Time:  Treatment Interventions: ADL retraining;UE strengthening/ROM;Endurance training (06/26/18 1059)  OT Time Spent: 45 minutes (06/27/18 0810)

## 2021-01-26 NOTE — ANESTHESIA PROCEDURE NOTES
Pre-Procedure   Staff -   Anesthesiologist:  Petey Melgar MD  Performed By: anesthesiologist  Location: pre-op  Procedure Start/Stop Times: 1/26/2021 7:16 AM and 1/26/2021 7:29 AM  Pre-Anesthestic Checklist: patient identified, IV checked, site marked, risks and benefits discussed, informed consent, monitors and equipment checked, pre-op evaluation, at physician/surgeon's request and post-op pain management  Timeout:  Correct Patient: Yes   Correct Procedure: Yes   Correct Site: Yes   Correct Position: Yes   Correct Laterality: Yes   Site Marked: Yes    Procedure Documentation  Procedure: Femoral  Diagnosis: DJD KNEE  Laterality: right  Patient Position:supine  Patient Prep/Sterile Barriers: sterile gloves, mask, Betadine  Needle type: short bevel and insulated  Needle Gauge: 22.   Needle Length (Inches) 2   Ultrasound guided, Ultrasound used to identify targeted nerve, plexus, vascular marker, or fascial plane and place a needle adjacent to it in real-time, Ultrasound was used to visualize the spread of anesthetic in close proximity to the above referenced structure    Assessment/Narrative      The placement was negative for: blood aspirated, painful injection and site bleeding  Paresthesias: No.  Bolus given via needle. Blood aspirated via catheter.   Secured via.   Insertion/Infusion Method: Single Shot  Complications: none  Comments:  30ml of 0.5% Bupivicaine w/ 1:200,000 epi injected    The surgeon has given a verbal order transferring care of this patient to me for the performance of a regional analgesia block for post-op pain control. It is requested of me because I am uniquely trained and qualified to perform this block and the surgeon is neither trained nor qualified to perform this procedure.

## 2021-01-26 NOTE — PLAN OF CARE
Patient vital signs are at baseline: Yes  Patient able to ambulate as they were prior to admission or with assist devices provided by therapies during their stay:  No, requiring A2, walker to bedside commode.   Patient MUST void prior to discharge:  Yes  Patient able to tolerate oral intake:  Tolerated clear liquid diet. Advanced to regular diet   Pain has adequate pain control using Oral analgesics:  Given tramadol for pain level 7/10. Will assess for pain control     Pt A&Ox4. Ace warp CDI. Reported numbness/tingling to BLE that is slowly improving.  Given tramadol for pain. PT eval pending. VSS, MARTY fang

## 2021-01-26 NOTE — OP NOTE
Maria Victoria Arcos  January 26, 2021  [facility]  PREOPERATIVE DIAGNOSIS: Severe degenerative arthritis refractory to conservative treatment, right knee.  POSTOPERATIVE DIAGNOSIS: Severe degenerative arthritis refractory to conservative treatment, right knee.  PROCEDURE: right total knee arthroplasty using [place] [employer] total knee system with a size 4  posterior stabilized femoral component, a 4 universal tibial baseplate, a 11 mm X3 tibial insert and a 29 asymmetric patellar button.  SURGEON: Himanshu Pedro MD.  FIRST ASSISTANT: Christa Issa PA-C, who provided retraction, positioning and was crucial throughout the entire case.  ANESTHESIA: Femoral nerve block followed by general anesthetic.  ESTIMATED BLOOD LOSS: 75 cc.  TOURNIQUET TIME: 93 minutes.  PREOPERATIVE ANTIBIOTIC PROPHYLAXIS: 2 gram IV.  With 1.95 gram of ORAL TXA PRIOR TO THE start.  POSTOPERATIVE DVT PROPHYLAXIS: ASA 81 mg po BID.  INDICATION: Maria Victoria Arcos has severe degenerative arthritis in knee. They have failed a course of conservative therapy, including cortisone, activity modification, physical therapy and pain medicine. Having discussed continued nonoperative versus operative treatment, the patient wished to proceed with surgery to include a total knee replacement as arthritis has affected all 3 compartments in the knee. The surgery, potential risks, benefits and complications as well as expected postoperative course and recovery were all discussed in detail. I reviewed the option of continued nonoperative treatment. All questions were answered, and informed consent was obtained.  OPERATIVE REPORT: The patient was taken to the preoperative area, where an adductor canal block was placed in the right lower extremity by the anesthesiologist. They were then taken to the operating room, and a general anesthetic was obtained. Ramirez catheter was placed. Intravenous antibiotic prophylaxis was then given as listed above 30  minutes prior to inflating the tourniquet. The right knee was confirmed as the operative knee. It was prepped and draped in the usual fashion. Timeout was performed, confirming the right knee as the operative knee, and then it was elevated and exsanguinated. Tourniquet was itifiated to 300 mmHg.  An approximately 5-inch long vertical incision was made over the anterior aspect of the knee centered over the patella. Full-thickness skin flaps were created. A medial parapatellar arthrotomy was performed, starting at the superior pole of the patella and extended distally. I then took the incision up through the mid fibers of the vastus medialis oblique in a vastus-splitting approach. Medial and lateral gufters were released, and then a medial release was performed on the upper aspect of the tibia. The patella was everted, and the patellar fat pad was excised. The patella measured 24 mm in maximal thickness. I then used a free-hand technique, resected it down to about 15 mm and then the patella sized to a 29 asymmetric patella. I drilled the holes and, with the trial bufton in place, the thickness measured 24mm. I then placed a metal cap over the patella, dislocated it laterally, flexed the knee up, removed the osteophytes in the distal femur, placed the intramedullary cutting guide and resected 10 mm off the distal femur in 5 degrees.  I then turned my attention to the tibia, which was carefully exposed. I then used the extramedullary cufting guide to resect 2 mm off the most involved side. I then checked my extension gap, and it was satisfactory. The knee came out to full extension. The tibia sized to a 4.  I then turned my attention to the femur. The femur sized to a 4 .  With the cufting block in place, the anterior, posterior and chamfer cuts were performed. I then went back to the tibia and removed the ACL, PCL, menisci and any posterior osteophytes off the condyles. I again checked my flexion and extension gaps  with the spacer block and the knee was well balanced. I then completed my tibial preparation for the universal tibial baseplate. I placed the size 4 trial tibia.  With the trial tibial plate in place, I cut the box on the femur, drilled the lugs and placed a trial femoral component. With a 11 mm insert on the tibia, the knee came out to full extension. The patella tracked perfectly, and the knee was well balanced. At this point, the trial components were removed. The bony surfaces were prepared with triple antibiotic pulsed saline. I then cemented the real components into place using speed-set cement. Any excess cement was removed. At this point, the tourniquet was released. Hemostasis was obtained. The real 11 mm X3 insert was then impacted. 1 gram of Vancomycin powder was placed in the wound.  The arthrotomy was then closed using interrupted 0 Vicryl plus sutures, 0 Stratafix plus suture and the skin was closed with 2-0 Vicryl plus and 3-0 Stratafix plus in the skin and Exofin fusion skin closure system.  The knee was placed in a soft compressive dressing. They were awoken and transferred to the Recovery Room in stable condition.  The postoperative plan will be to admit the patient to the hospital. They can weightbear as tolerated. There were no noted complications. Sponge and needle counts were correct.

## 2021-01-26 NOTE — ANESTHESIA CARE TRANSFER NOTE
Patient: Maria Victoria Arcos    Procedure(s):  Right total knee arthroplasty    Diagnosis: Osteoarthritis of right knee [M17.11]  Diagnosis Additional Information: No value filed.    Anesthesia Type:   General     Note:    Oropharynx: oropharynx clear of all foreign objects  Level of Consciousness: awake  Oxygen Supplementation: face mask    Independent Airway: airway patency satisfactory and stable  Dentition: dentition unchanged  Vital Signs Stable: post-procedure vital signs reviewed and stable  Report to RN Given: handoff report given  Patient transferred to: PACU  Comments: Pt's VSS, A/O x3 resting comfortably post procedure, care continued by RN.   Handoff Report: Identifed the Patient, Identified the Reponsible Provider, Reviewed the pertinent medical history, Discussed the surgical course, Reviewed Intra-OP anesthesia mangement and issues during anesthesia, Set expectations for post-procedure period and Allowed opportunity for questions and acknowledgement of understanding      Vitals: (Last set prior to Anesthesia Care Transfer)  CRNA VITALS  1/26/2021 1006 - 1/26/2021 1042      1/26/2021             SpO2:  97 %    EKG:  Sinus rhythm        Electronically Signed By: JOELLE Childers CRNA  January 26, 2021  10:42 AM

## 2021-01-26 NOTE — ANESTHESIA PROCEDURE NOTES
Pre-Procedure   Staff -   Anesthesiologist:  Petey Melgar MD  Performed By: anesthesiologist  Location: OR  Pre-Anesthestic Checklist: patient identified, IV checked, site marked, risks and benefits discussed, informed consent, monitors and equipment checked and pre-op evaluation  Timeout:  Correct Patient: Yes   Correct Procedure: Yes   Correct Site: Yes   Correct Position: Yes   Correct Laterality: Yes   Site Marked: Yes    Procedure Documentation  Procedure: intrathecal  Diagnosis: DJD  Patient Position:sitting  Patient Prep/Sterile Barriers: sterile gloves, mask, Betadine  Insertion Site: L3-4. (midline approach).  Spinal Needle (gauge): 24   Spinal Needle Type: QuinckeIntroducer used  Introducer: 20 G  # of attempts: 3 and  # of redirects:  3    Assessment/Narrative      Paresthesias: No.  Time Injected: 07:41 while  CSF fluid: cloudy.  Comments:  11mg of 0.75% bupivicaine  injected into the IT space.

## 2021-01-27 ENCOUNTER — APPOINTMENT (OUTPATIENT)
Dept: PHYSICAL THERAPY | Facility: CLINIC | Age: 71
End: 2021-01-27
Attending: ORTHOPAEDIC SURGERY
Payer: MEDICARE

## 2021-01-27 VITALS
OXYGEN SATURATION: 93 % | RESPIRATION RATE: 18 BRPM | SYSTOLIC BLOOD PRESSURE: 124 MMHG | TEMPERATURE: 98.8 F | WEIGHT: 230 LBS | DIASTOLIC BLOOD PRESSURE: 54 MMHG | HEART RATE: 62 BPM | BODY MASS INDEX: 40.75 KG/M2 | HEIGHT: 63 IN

## 2021-01-27 LAB
CREAT SERPL-MCNC: 0.69 MG/DL (ref 0.52–1.04)
GFR SERPL CREATININE-BSD FRML MDRD: 89 ML/MIN/{1.73_M2}
GLUCOSE SERPL-MCNC: 107 MG/DL (ref 70–99)
HGB BLD-MCNC: 9.5 G/DL (ref 11.7–15.7)
INTERPRETATION ECG - MUSE: NORMAL
PLATELET # BLD AUTO: 143 10E9/L (ref 150–450)

## 2021-01-27 PROCEDURE — 97110 THERAPEUTIC EXERCISES: CPT | Mod: GP | Performed by: PHYSICAL THERAPIST

## 2021-01-27 PROCEDURE — 97116 GAIT TRAINING THERAPY: CPT | Mod: GP | Performed by: PHYSICAL THERAPIST

## 2021-01-27 PROCEDURE — 250N000013 HC RX MED GY IP 250 OP 250 PS 637: Mod: GY | Performed by: PHYSICIAN ASSISTANT

## 2021-01-27 PROCEDURE — 36415 COLL VENOUS BLD VENIPUNCTURE: CPT | Performed by: ORTHOPAEDIC SURGERY

## 2021-01-27 PROCEDURE — 97530 THERAPEUTIC ACTIVITIES: CPT | Mod: GP | Performed by: PHYSICAL THERAPIST

## 2021-01-27 PROCEDURE — 96372 THER/PROPH/DIAG INJ SC/IM: CPT | Performed by: PHYSICIAN ASSISTANT

## 2021-01-27 PROCEDURE — 250N000011 HC RX IP 250 OP 636: Performed by: PHYSICIAN ASSISTANT

## 2021-01-27 PROCEDURE — 85049 AUTOMATED PLATELET COUNT: CPT | Performed by: ORTHOPAEDIC SURGERY

## 2021-01-27 PROCEDURE — 85018 HEMOGLOBIN: CPT | Performed by: ORTHOPAEDIC SURGERY

## 2021-01-27 PROCEDURE — 250N000013 HC RX MED GY IP 250 OP 250 PS 637: Mod: GY | Performed by: ORTHOPAEDIC SURGERY

## 2021-01-27 PROCEDURE — 250N000011 HC RX IP 250 OP 636: Performed by: ORTHOPAEDIC SURGERY

## 2021-01-27 PROCEDURE — 82947 ASSAY GLUCOSE BLOOD QUANT: CPT | Performed by: ORTHOPAEDIC SURGERY

## 2021-01-27 PROCEDURE — 82565 ASSAY OF CREATININE: CPT | Performed by: ORTHOPAEDIC SURGERY

## 2021-01-27 RX ORDER — OXYCODONE HYDROCHLORIDE 5 MG/1
5 TABLET ORAL EVERY 4 HOURS PRN
Qty: 15 TABLET | Refills: 0 | Status: SHIPPED | OUTPATIENT
Start: 2021-01-27 | End: 2021-01-30

## 2021-01-27 RX ADMIN — POTASSIUM CHLORIDE 20 MEQ: 1500 TABLET, EXTENDED RELEASE ORAL at 07:48

## 2021-01-27 RX ADMIN — TRAMADOL HYDROCHLORIDE 50 MG: 50 TABLET, FILM COATED ORAL at 14:17

## 2021-01-27 RX ADMIN — CEFAZOLIN SODIUM 1 G: 1 INJECTION, SOLUTION INTRAVENOUS at 09:28

## 2021-01-27 RX ADMIN — TRAMADOL HYDROCHLORIDE 50 MG: 50 TABLET, FILM COATED ORAL at 09:18

## 2021-01-27 RX ADMIN — ENOXAPARIN SODIUM 40 MG: 40 INJECTION SUBCUTANEOUS at 07:47

## 2021-01-27 RX ADMIN — ATENOLOL 50 MG: 50 TABLET ORAL at 07:48

## 2021-01-27 RX ADMIN — AMLODIPINE BESYLATE 10 MG: 10 TABLET ORAL at 09:18

## 2021-01-27 RX ADMIN — ONDANSETRON 4 MG: 2 INJECTION INTRAMUSCULAR; INTRAVENOUS at 09:19

## 2021-01-27 RX ADMIN — HYDROCHLOROTHIAZIDE 25 MG: 25 TABLET ORAL at 09:18

## 2021-01-27 RX ADMIN — CEFAZOLIN SODIUM 1 G: 1 INJECTION, SOLUTION INTRAVENOUS at 00:38

## 2021-01-27 RX ADMIN — TRAMADOL HYDROCHLORIDE 50 MG: 50 TABLET, FILM COATED ORAL at 02:45

## 2021-01-27 RX ADMIN — IBUPROFEN 600 MG: 600 TABLET, FILM COATED ORAL at 13:05

## 2021-01-27 RX ADMIN — KETOROLAC TROMETHAMINE 15 MG: 15 INJECTION, SOLUTION INTRAMUSCULAR; INTRAVENOUS at 00:38

## 2021-01-27 RX ADMIN — Medication 1 TABLET: at 07:47

## 2021-01-27 NOTE — PLAN OF CARE
Patient vital signs are at baseline: Yes  Patient able to ambulate as they were prior to admission or with assist devices provided by therapies during their stay:  No,  Reason:  Just up to [facility] overnight w/A1/walker.  Patient MUST void prior to discharge:  Yes  Patient able to tolerate oral intake:  Yes, denies nausea. Will have regular diet for breakfast.  Pain has adequate pain control using Oral analgesics:  No,  Reason:  Pt states that the Tramadol is not relieving her pain. One-time dose of Toradol was helpful and allowed pt to sleep. But she would like a different analgesic for home.    Dressing with bloody drainage. ACE removed and previously placed reinforcement  ABDs changed. New ACE applied. Dressings dry and intact this am.

## 2021-01-27 NOTE — DISCHARGE INSTRUCTIONS
Return to clinic in 10-14 days.  Call [phone] to schedule or if you experience any problems before your scheduled appointment.      1. Do exercises at home as instructed by therapist twice a day. Start outpatient therapy as ordered by your doctor.  2. Ice knee after exercises and therapy.  3. Examine dressing daily for problems.  4. May shower, can get dressing wet, no soaking (no bathtubs, pools or hot tubs)  5. Notify your dentist or physician of your implant so you can get antibiotics before any dental work or before any invasive procedure (ie: colonoscopy)      Aquacel dressing:  DO NOT CHANGE DRESSING DAILY.  Leave this dressing on until follow-up appointment with Orthopedic Surgeon.  Dressing is waterproof, can shower with it on, pat dry when done.  No soaking such as in tub baths, pools or hot tubs        While on narcotic pain medication, to prevent constipation:  1. Drink plenty of water to keep well hydrated   2. May take over the counter Colace or Senna (follow instructions on label)        Call your physician if: ([phone])   1. Persistent fever greater than 101 degrees with body chills or excessive sweating.  2. Increased/persistent redness, localized warmth, tenderness, drainage or swelling at dressing site. Greater than 50% drainage on dressing.   3. Persistent pain not controlled with oral pain medications, ice and rest.   4. No bowel movement in 3 days (may use Milk of Magnesia or other over the counter remedy).  5. Chest pain, shortness of breath, and/or calf pain with excessive swelling.  6. Generalized feeling of illness such as nausea/vomiting and/or lightheaded/dizziness.  7. Any other questions or concerns related to your surgery/recovery.    Thank you for allowing [facility] to participate in your cares!!

## 2021-01-27 NOTE — PLAN OF CARE
Physical Therapy Discharge Summary     Reason for therapy discharge:    Discharged to home with OP PT starting on Friday.     Progress towards therapy goal(s). See goals on Care Plan in [facility] electronic health record for goal details.  Goals paritally met.  Barriers to achieving goals:   increased pain during stay, pt was SBA/cga with bed mob, CGA for sit<>stand transfers, limited ambulation- 30-40 feet, CGA, 4 stairs with SEC, min A and rail.      Therapy recommendation(s):    Continued therapy is recommended.  Rationale/Recommendations:  Pt is below baseline with functional mobility and strength and would benefit from continued PT to progress skills.

## 2021-01-27 NOTE — PLAN OF CARE
Patient vital signs are at baseline: Yes  Patient able to ambulate as they were prior to admission or with assist devices provided by therapies during their stay:  No,  Reason:  [place] [facility] [place]  Patient MUST void prior to discharge:  Yes  Patient able to tolerate oral intake:  Yes, denies nausea. Will have regular diet for breakfast.  Pain has adequate pain control using Oral analgesics:  No,  Reason:  Pt states that the Tramadol is not relieving her pain. Will try Toradol.

## 2021-01-27 NOTE — PROGRESS NOTES
Evening RN    Patient vital signs are at baseline: Yes  Patient able to ambulate as they were prior to admission or with assist devices provided by therapies during their stay:  Yes  Patient MUST void prior to discharge:  Yes  Patient able to tolerate oral intake:  Yes  Pain has adequate pain control using Oral analgesics:  Yes     Pt A/O x4.  VSS and afebrile.  Pain managed adequately with PRN PO atarax and tramadol.  CMS intact.  Dressing required attention x2 this shift - around 1545 there was a large amount of sanguinous drainage soaking through ACE on top of knee: ACE was removed and abd's placed over top of gauze in place from surgery.  Around 1730 small amount of seepage through ACE and reinforcement and so 1 more abd pad added over the top of other ACE with one smaller wrap on top.  This reinforcement has remained CDI for rest of shift.  Up A1 with walker and gait belt.  Became lightheaded, nauseated and very painful when out in the [place] with PT early in afternoon.  Took RN and PT to wheel pt back into room on a chair and get her back in bed - BP was ok and pt recovered quickly after lying back down.  Pt was steady on her feet and experienced no further dizzy/nausea episodes throughout rest of the night.  Voiding adequately.  Tolerating regular diet well.  Plan is home with  on discharge; PT will do 2 visits tomorrow to ensure readiness for discharge.  Will continue to monitor.

## 2021-01-27 NOTE — DISCHARGE SUMMARY
[facility] Discharge Summary    Maria Victoria Arcos [id number]   Age: 70 year old 1950     Date of Admission:  1/26/2021  Date of Discharge::  1/27/2021  2:40 PM  Admitting Physician:  Himanshu Pedro MD  Discharge Physician:  Himanshu Pedro MD               Admission Diagnoses:   Osteoarthritis          Discharge Diagnosis:     Arthoplasty of the knee          Procedures:     Procedure(s): Total knee arthoplasty (Right)                Medications Prior to Admission:     No medications prior to admission.             Discharge Medications:     Discharge Medication List as of 1/27/2021  2:09 PM      START taking these medications    Details   enoxaparin ANTICOAGULANT (LOVENOX) 40 MG/0.4ML syringe Inject 0.4 mLs (40 mg) Subcutaneous every 24 hours for 10 doses, Disp-4 mL, R-0, E-Prescribe      hydrOXYzine (ATARAX) 25 MG tablet Take 1 tablet (25 mg) by mouth every 6 hours as needed for itching (and nausea), Disp-30 tablet, R-0, E-Prescribe      oxyCODONE (ROXICODONE) 5 MG tablet Take 1 tablet (5 mg) by mouth every 4 hours as needed for moderate to severe pain (take every 4-6 hours as needed for pain rated 6/10 or higher), Disp-15 tablet, R-0, Local Print      senna-docusate (SENOKOT-S/PERICOLACE) 8.6-50 MG tablet Take 1-2 tablets by mouth 2 times daily Take while on oral narcotics to prevent or treat constipation., Disp-30 tablet, R-0, E-PrescribeWhile on narcotics      traMADol (ULTRAM) 50 MG tablet Take 1 tablet (50 mg) by mouth every 6 hours as needed for moderate to severe pain, Disp-20 tablet, R-0, E-Prescribe         CONTINUE these medications which have NOT CHANGED    Details   amLODIPine (NORVASC) 10 MG tablet Take 1 tablet (10 mg) by mouth daily, Disp-90 tablet, R-0, E-Prescribe      aspirin 81 MG tablet Take 1 tablet by mouth daily., Disp-100 tablet, R-3, Historical      atenolol (TENORMIN) 50 MG tablet Take 1 tablet (50 mg) by mouth daily, Disp-90 tablet, R-1, E-Prescribe      budesonide-formoterol  (SYMBICORT) 160-4.5 MCG/ACT Inhaler Inhale 2 puffs into the lungs 2 times daily, Historical      COMPOUNDED NON-CONTROLLED SUBSTANCE (CMPD RX) - PHARMACY TO MIX COMPOUNDED MEDICATION Estradiol 0.02% cream (contains 0.2mg estradiol/gm of cream)- apply 1 gram daily to the vaginal area for one week then 1g 2-3 times weekly, Disp-30 g, R-11, E-Prescribe      Folic Acid-Vit B6-Vit B12 2.5-25-1 MG TABS Take 1 tablet by mouth daily, Disp-90 tablet, R-3, E-Prescribe      hydrochlorothiazide (HYDRODIURIL) 25 MG tablet Take 1 tablet (25 mg) by mouth daily, Disp-90 tablet, R-0, E-Prescribe      ibuprofen (ADVIL/MOTRIN) 200 MG tablet Take 200 mg by mouth every 4 hours as needed for mild pain, Historical      ketoconazole (NIZORAL) 2 % external cream APPLY EXTERNALLY TO THE AFFECTED AREA DAILY AS NEEDEDDisp-30 g, [id number]      potassium chloride ER (KLOR-CON M) 20 MEQ CR tablet Take 1 tablet (20 mEq) by mouth daily, Disp-90 tablet, R-1, E-Prescribe      PREMARIN cream Place 1 g vaginally twice a week, Disp-30 g, R-4, EV, E-Prescribe      rosuvastatin (CRESTOR) 10 MG tablet Take 1 tablet (10 mg) by mouth daily, Disp-90 tablet, R-3, E-Prescribe      TYLENOL 325 MG OR TABS ONE TO TWO TABLETS EVERY 4 TO 6 HOURS AS NEEDED FOR PAIN, Disp-100, R-0, Historical      VITAMIN D PO Take by mouth daily, Historical      albuterol (PROAIR HFA/PROVENTIL HFA/VENTOLIN HFA) 108 (90 Base) MCG/ACT inhaler Inhale 2 puffs into the lungs every 2 hours as needed for shortness of breath / dyspnea, Disp-1 Inhaler,R-3, Local Print      valACYclovir (VALTREX) 1000 mg tablet TAKE 2 TABLETS BY MOUTH TWICE DAILY, Disp-12 tablet, R-1, E-Prescribe                   Consultations:   Consultation during this admission received from: N/A          Brief History of Illness:   Reason for admission requiring a surgical or invasive procedure:   Arthoplasty of the knee   The patient underwent the following procedure(s):   Total knee arthoplasty (Right)   There  were no immediate complications during this procedure.    Please refer to the full operative summary for details.             Hospital Course:   The patient's hospital course was unremarkable.  The patient recovered as anticipated and experienced no post-operative complications.  They Did not receive a blood transfusion.          Discharge Instructions and Follow-Up:     Discharge diet: Regular   Discharge activity: Activity as tolerated   Discharge follow-up: Follow up with me in 10-14 days   Anticoagulation Resume asprin 81 mg daily and begin Lovenox for 10-14 days.   Wound care: Keep wound clean and dry  May get incision wet in shower but do not soak or scrub  Mesh bandage will fall off on its own in 3-4 weeks. Call Dr. Pedro's office if concerned           Discharge Disposition:     Discharged to home      Attestation:  I have reviewed today's vital signs, notes, medications, labs and imaging.    Christa Issa PA-C

## 2021-01-29 ENCOUNTER — THERAPY VISIT (OUTPATIENT)
Dept: PHYSICAL THERAPY | Facility: CLINIC | Age: 71
End: 2021-01-29
Payer: MEDICARE

## 2021-01-29 DIAGNOSIS — Z96.651 STATUS POST TOTAL RIGHT KNEE REPLACEMENT: ICD-10-CM

## 2021-01-29 DIAGNOSIS — Z47.89 AFTERCARE FOLLOWING SURGERY OF THE MUSCULOSKELETAL SYSTEM: ICD-10-CM

## 2021-01-29 PROCEDURE — 97110 THERAPEUTIC EXERCISES: CPT | Mod: GP | Performed by: PHYSICAL THERAPIST

## 2021-01-29 PROCEDURE — 97161 PT EVAL LOW COMPLEX 20 MIN: CPT | Mod: GP | Performed by: PHYSICAL THERAPIST

## 2021-01-29 ASSESSMENT — ACTIVITIES OF DAILY LIVING (ADL)
STAND: ACTIVITY IS SOMEWHAT DIFFICULT
GIVING WAY, BUCKLING OR SHIFTING OF KNEE: THE SYMPTOM AFFECTS MY ACTIVITY SLIGHTLY
WALK: ACTIVITY IS SOMEWHAT DIFFICULT
SWELLING: THE SYMPTOM AFFECTS MY ACTIVITY MODERATELY
AS_A_RESULT_OF_YOUR_KNEE_INJURY,_HOW_WOULD_YOU_RATE_YOUR_CURRENT_LEVEL_OF_DAILY_ACTIVITY?: SEVERELY ABNORMAL
HOW_WOULD_YOU_RATE_THE_OVERALL_FUNCTION_OF_YOUR_KNEE_DURING_YOUR_USUAL_DAILY_ACTIVITIES?: NEARLY NORMAL
GO DOWN STAIRS: ACTIVITY IS MINIMALLY DIFFICULT
SIT WITH YOUR KNEE BENT: ACTIVITY IS FAIRLY DIFFICULT
KNEEL ON THE FRONT OF YOUR KNEE: I AM UNABLE TO DO THE ACTIVITY
SQUAT: I AM UNABLE TO DO THE ACTIVITY
WEAKNESS: THE SYMPTOM AFFECTS MY ACTIVITY MODERATELY
STIFFNESS: THE SYMPTOM AFFECTS MY ACTIVITY SEVERELY
RISE FROM A CHAIR: ACTIVITY IS SOMEWHAT DIFFICULT
HOW_WOULD_YOU_RATE_THE_CURRENT_FUNCTION_OF_YOUR_KNEE_DURING_YOUR_USUAL_DAILY_ACTIVITIES_ON_A_SCALE_FROM_0_TO_100_WITH_100_BEING_YOUR_LEVEL_OF_KNEE_FUNCTION_PRIOR_TO_YOUR_INJURY_AND_0_BEING_THE_INABILITY_TO_PERFORM_ANY_OF_YOUR_USUAL_DAILY_ACTIVITIES?: 60
GO UP STAIRS: ACTIVITY IS FAIRLY DIFFICULT
PAIN: THE SYMPTOM AFFECTS MY ACTIVITY SEVERELY

## 2021-01-29 NOTE — PROGRESS NOTES
[facility] Initial Evaluation  Subjective:  The history is provided by the patient. No  was used.   Patient Health History  Maria Victoria Arcos being seen for s/p R TKA.     Problem began: 1/26/2021.   Problem occurred: degeneration   Pain is reported as 6/10 on pain scale.  General health as reported by patient is good.  Pertinent medical history includes: asthma, high blood pressure, overweight and osteoarthritis.   Red flags:  None as reported by patient.     Surgeries include:  Other and orthopedic surgery.    Current medications:  Other.    Current occupation is retired urse.                     Therapist Generated HPI Evaluation  Problem details: Pt. complains of R knee pain that has been present since R TKA on 1/26.    .         Type of problem:  Right knee.    This is a new condition.  Condition occurred with:  Degenerative joint disease.  Where condition occurred: for unknown reasons.  Patient reports pain:  Anterior and sub patellar.  Pain is described as aching and is intermittent.  Pain radiates to:  Knee.   Since onset symptoms are gradually improving.  Associated symptoms:  Loss of motion/stiffness and loss of strength. Symptoms are exacerbated by walking, standing, descending stairs and ascending stairs  and relieved by rest and ice.      Barriers include:  None as reported by patient.                        Objective:    Gait:    Gait Type:  Antalgic   Weight Bearing Status:  WBAT   Assistive Devices:  Walker                                                        Knee Evaluation:  ROM:      PROM    Hyperextension: Left:   Right:  0  Extension: Left:   Right:  4  Flexion: Left:   Right:  88            Palpation:  Normal      Edema:  Normal            General     ROS    Assessment/Plan:    Patient is a 70 year old female with right side knee complaints.    Patient has the following significant findings with corresponding treatment plan.                Diagnosis 1:   S/p R TKA  Pain -  hot/cold therapy, self management, education, directional preference exercise and home program  Decreased ROM/flexibility - manual therapy and therapeutic exercise  Decreased strength - therapeutic exercise and therapeutic activities  Impaired muscle performance - neuro re-education  Decreased function - therapeutic activities    Therapy Evaluation Codes:   1) Clinical presentation characteristics are:   Stable/Uncomplicated.  2) Decision-Making    Low complexity using standardized patient assessment instrument and/or measureable assessment of functional outcome.  Cumulative Therapy Evaluation is: Low complexity.    Previous and current functional limitations:  (See Goal Flow Sheet for this information)    Short term and Long term goals: (See Goal Flow Sheet for this information)     Communication ability:  Patient appears to be able to clearly communicate and understand verbal and written communication and follow directions correctly.  Treatment Explanation - The following has been discussed with the patient:   RX ordered/plan of care  Anticipated outcomes  Possible risks and side effects  This patient would benefit from PT intervention to resume normal activities.   Rehab potential is good.    Frequency:  2 X week, once daily  Duration:  for 6 weeks  Discharge Plan:  Achieve all LTG.  Independent in home treatment program.  Reach maximal therapeutic benefit.    Please refer to the daily flowsheet for treatment today, total treatment time and time spent performing 1:1 timed codes.

## 2021-01-29 NOTE — LETTER
DEPARTMENT OF HEALTH AND HUMAN SERVICES  CENTERS FOR MEDICARE & MEDICAID SERVICES    PLAN/UPDATED PLAN OF PROGRESS FOR OUTPATIENT REHABILITATION      PATIENTS NAME:  Maria Victoria Arcos   : 1950  PROVIDER NUMBER:    [id number]  HICN: [id number]   PROVIDER NAME: [employer] [facility] [place] [facility]  MEDICAL RECORD NUMBER: [id number]   START OF CARE DATE:  SOC Date: 21   TYPE:  PT  PRIMARY/TREATMENT DIAGNOSIS:  Aftercare following surgery of the musculoskeletal system  Status post total right knee replacement  VISITS FROM START OF CARE:  Rxs Used: 1     [place] [facility] [employer] [facility] Initial Evaluation  Subjective:  The history is provided by the patient. No  was used.   Patient Health History  Maria Victoria Arcos being seen for s/p R TKA.   Problem began: 2021.   Problem occurred: degeneration   Pain is reported as 6/10 on pain scale.  General health as reported by patient is good.  Pertinent medical history includes: asthma, high blood pressure, overweight and osteoarthritis.   Red flags:  None as reported by patient.   Surgeries include:  Other and orthopedic surgery.    Current medications:  Other.    Current occupation is retired [employer].       Therapist Generated HPI Evaluation  Problem details: Pt. complains of R knee pain that has been present since R TKA on .    .        Type of problem:  Right knee.  This is a new condition.  Condition occurred with:  Degenerative joint disease.  Where condition occurred: for unknown reasons.  Patient reports pain:  Anterior and sub patellar.  Pain is described as aching and is intermittent.  Pain radiates to:  Knee.   Since onset symptoms are gradually improving.  Associated symptoms:  Loss of motion/stiffness and loss of strength. Symptoms are exacerbated by walking, standing, descending stairs and ascending stairs  and relieved by rest and ice.  Barriers include:  None as reported by patient.       Objective:  Gait:    Gait Type:   Antalgic   Weight Bearing Status:  WBAT   Assistive Devices:  Walker        Knee Evaluation:  ROM:    PROM  Hyperextension: Left:   Right:  0  Extension: Left:   Right:  4  Flexion: Left:   Right:  88  Palpation:  Normal  PATIENTS NAME:  Maria Victoria Arcos   : 1950    Edema:  Normal    Assessment/Plan:    Patient is a 70 year old female with right side knee complaints.    Patient has the following significant findings with corresponding treatment plan.                Diagnosis 1:  S/p R TKA  Pain -  hot/cold therapy, self management, education, directional preference exercise and home program  Decreased ROM/flexibility - manual therapy and therapeutic exercise  Decreased strength - therapeutic exercise and therapeutic activities  Impaired muscle performance - neuro re-education  Decreased function - therapeutic activities  Therapy Evaluation Codes:   1) Clinical presentation characteristics are:   Stable/Uncomplicated.  2) Decision-Making    Low complexity using standardized patient assessment instrument and/or measureable assessment of functional outcome.  Cumulative Therapy Evaluation is: Low complexity.  Previous and current functional limitations:  (See Goal Flow Sheet for this information)    Short term and Long term goals: (See Goal Flow Sheet for this information)   Communication ability:  Patient appears to be able to clearly communicate and understand verbal and written communication and follow directions correctly.  Treatment Explanation - The following has been discussed with the patient:   RX ordered/plan of care Anticipated outcomes Possible risks and side effects  This patient would benefit from PT intervention to resume normal activities.   Rehab potential is good.  Frequency:  2 X week, once daily  Duration:  for 6 weeks  Discharge Plan:  Achieve all LTG.  Independent in home treatment program.  Reach maximal therapeutic benefit.      Caregiver Signature/Credentials _____________________________  "Date ________       Treating Provider: Cheng Vernon PT     I have reviewed and certified the need for these services and plan of treatment while under my care.      PHYSICIAN'S SIGNATURE:   _____________________________________  Date___________                              Himanshu Pedro MD    Certification period:  Beginning of Cert date period: 01/29/21 to  End of Cert period date: 04/28/21     Functional Level Progress Report: Please see attached \"Goal Flow sheet for Functional level.\"    ____X____ Continue Services or       ________ DC Services                Service dates: From  SOC Date: 01/29/21 date to present                         "

## 2021-02-01 ENCOUNTER — THERAPY VISIT (OUTPATIENT)
Dept: PHYSICAL THERAPY | Facility: CLINIC | Age: 71
End: 2021-02-01
Payer: MEDICARE

## 2021-02-01 DIAGNOSIS — Z47.89 AFTERCARE FOLLOWING SURGERY OF THE MUSCULOSKELETAL SYSTEM: ICD-10-CM

## 2021-02-01 DIAGNOSIS — Z96.651 STATUS POST TOTAL RIGHT KNEE REPLACEMENT: ICD-10-CM

## 2021-02-01 PROCEDURE — 97110 THERAPEUTIC EXERCISES: CPT | Mod: GP | Performed by: PHYSICAL THERAPIST

## 2021-02-01 PROCEDURE — 97530 THERAPEUTIC ACTIVITIES: CPT | Mod: GP | Performed by: PHYSICAL THERAPIST

## 2021-02-01 PROCEDURE — 97010 HOT OR COLD PACKS THERAPY: CPT | Mod: GP | Performed by: PHYSICAL THERAPIST

## 2021-02-04 ENCOUNTER — OFFICE VISIT (OUTPATIENT)
Dept: FAMILY MEDICINE | Facility: CLINIC | Age: 71
End: 2021-02-04
Payer: MEDICARE

## 2021-02-04 VITALS
BODY MASS INDEX: 42.16 KG/M2 | SYSTOLIC BLOOD PRESSURE: 126 MMHG | RESPIRATION RATE: 18 BRPM | OXYGEN SATURATION: 99 % | HEART RATE: 67 BPM | TEMPERATURE: 98.4 F | DIASTOLIC BLOOD PRESSURE: 60 MMHG | WEIGHT: 238 LBS

## 2021-02-04 DIAGNOSIS — R30.0 DYSURIA: Primary | ICD-10-CM

## 2021-02-04 DIAGNOSIS — N30.01 ACUTE CYSTITIS WITH HEMATURIA: ICD-10-CM

## 2021-02-04 DIAGNOSIS — R82.90 NONSPECIFIC FINDING ON EXAMINATION OF URINE: ICD-10-CM

## 2021-02-04 LAB
ALBUMIN UR-MCNC: 100 MG/DL
APPEARANCE UR: ABNORMAL
BACTERIA #/AREA URNS HPF: ABNORMAL /HPF
BASOPHILS # BLD AUTO: 0 10E9/L (ref 0–0.2)
BASOPHILS NFR BLD AUTO: 0.3 %
BILIRUB UR QL STRIP: NEGATIVE
COLOR UR AUTO: YELLOW
DIFFERENTIAL METHOD BLD: ABNORMAL
EOSINOPHIL # BLD AUTO: 0.3 10E9/L (ref 0–0.7)
EOSINOPHIL NFR BLD AUTO: 2.1 %
ERYTHROCYTE [DISTWIDTH] IN BLOOD BY AUTOMATED COUNT: 12.6 % (ref 10–15)
GLUCOSE UR STRIP-MCNC: NEGATIVE MG/DL
HCT VFR BLD AUTO: 30.7 % (ref 35–47)
HGB BLD-MCNC: 9.7 G/DL (ref 11.7–15.7)
HGB UR QL STRIP: ABNORMAL
KETONES UR STRIP-MCNC: NEGATIVE MG/DL
LEUKOCYTE ESTERASE UR QL STRIP: ABNORMAL
LYMPHOCYTES # BLD AUTO: 1.5 10E9/L (ref 0.8–5.3)
LYMPHOCYTES NFR BLD AUTO: 11.5 %
MCH RBC QN AUTO: 27.9 PG (ref 26.5–33)
MCHC RBC AUTO-ENTMCNC: 31.6 G/DL (ref 31.5–36.5)
MCV RBC AUTO: 88 FL (ref 78–100)
MONOCYTES # BLD AUTO: 0.9 10E9/L (ref 0–1.3)
MONOCYTES NFR BLD AUTO: 6.7 %
NEUTROPHILS # BLD AUTO: 10 10E9/L (ref 1.6–8.3)
NEUTROPHILS NFR BLD AUTO: 79.4 %
NITRATE UR QL: NEGATIVE
NON-SQ EPI CELLS #/AREA URNS LPF: ABNORMAL /LPF
PH UR STRIP: 8 PH (ref 5–7)
PLATELET # BLD AUTO: 350 10E9/L (ref 150–450)
RBC # BLD AUTO: 3.48 10E12/L (ref 3.8–5.2)
RBC #/AREA URNS AUTO: >100 /HPF
SOURCE: ABNORMAL
SP GR UR STRIP: 1.02 (ref 1–1.03)
UROBILINOGEN UR STRIP-ACNC: 0.2 EU/DL (ref 0.2–1)
WBC # BLD AUTO: 12.6 10E9/L (ref 4–11)
WBC #/AREA URNS AUTO: ABNORMAL /HPF

## 2021-02-04 PROCEDURE — 85025 COMPLETE CBC W/AUTO DIFF WBC: CPT | Performed by: FAMILY MEDICINE

## 2021-02-04 PROCEDURE — 36415 COLL VENOUS BLD VENIPUNCTURE: CPT | Performed by: FAMILY MEDICINE

## 2021-02-04 PROCEDURE — 87086 URINE CULTURE/COLONY COUNT: CPT | Performed by: FAMILY MEDICINE

## 2021-02-04 PROCEDURE — 81001 URINALYSIS AUTO W/SCOPE: CPT | Performed by: FAMILY MEDICINE

## 2021-02-04 PROCEDURE — 87186 SC STD MICRODIL/AGAR DIL: CPT | Performed by: FAMILY MEDICINE

## 2021-02-04 PROCEDURE — 99214 OFFICE O/P EST MOD 30 MIN: CPT | Performed by: FAMILY MEDICINE

## 2021-02-04 PROCEDURE — 87088 URINE BACTERIA CULTURE: CPT | Performed by: FAMILY MEDICINE

## 2021-02-04 RX ORDER — SULFAMETHOXAZOLE/TRIMETHOPRIM 800-160 MG
1 TABLET ORAL 2 TIMES DAILY
Qty: 14 TABLET | Refills: 0 | Status: SHIPPED | OUTPATIENT
Start: 2021-02-04 | End: 2021-02-11

## 2021-02-04 ASSESSMENT — ENCOUNTER SYMPTOMS
DYSURIA: 1
CONSTITUTIONAL NEGATIVE: 1
DIFFICULTY URINATING: 1
GASTROINTESTINAL NEGATIVE: 1
HEMATURIA: 1

## 2021-02-04 NOTE — PROGRESS NOTES
"  Assessment and Plan    (R30.0) Dysuria  (primary encounter diagnosis)  Comment:   Plan: *UA reflex to Microscopic, CBC with platelets         and differential, Urine Microscopic            (R82.90) Nonspecific finding on examination of urine  Comment:   Plan: Urine Culture Aerobic Bacterial            (N30.01) Acute cystitis with hematuria  Comment: some subjective constitutional sx and mild leukocytosis is concerning for incipient pyelo.  Will treat more aggressively and pt cautioned that if continuing to worsen despite abx she should be seen in ER.  Plan: sulfamethoxazole-trimethoprim (BACTRIM DS)         800-160 MG tablet              RTC in 1w prn    Gonzales Laws MD      Subjective     Maria Victoria is a 70 year old who presents to clinic today for the following health issues     HPI       Genitourinary - Female  Onset/Duration: x 2 days  Description:   Painful urination (Dysuria): YES           Frequency: YES  Blood in urine (Hematuria): YES  Delay in urine (Hesitency): YES  Intensity: severe  Progression of Symptoms:  worsening and constant  Accompanying Signs & Symptoms:  Fever/chills: YES  Flank pain: no  Nausea and vomiting: YES- \"queezy\"\"  Vaginal symptoms: none  Abdominal/Pelvic Pain: no  History:   History of frequent UTI s: no  History of kidney stones: no  Sexually Active: no  Possibility of pregnancy: No  Precipitating or alleviating factors: None  Therapies tried and outcome: Cranberry juice prn (contraindicated in Coumadin patients), Increase fluid intake     Has been having chills, feeling shaky.  Had knee surgery last week and was cathed for this.  Doesn't feeling like she's emptying her bladder.  Has been seeing blood in her urine.      Review of Systems   Constitutional: Negative.    Gastrointestinal: Negative.    Genitourinary: Positive for difficulty urinating, dysuria and hematuria.            Objective    /60 (BP Location: Right arm, Patient Position: Sitting, Cuff Size: Adult Large)   " Pulse 67   Temp 98.4  F (36.9  C) (Oral)   Resp 18   Wt 108 kg (238 lb)   SpO2 99%   BMI 42.16 kg/m    Body mass index is 42.16 kg/m .  Physical Exam  Vitals signs reviewed.   Eyes:      Conjunctiva/sclera: Conjunctivae normal.   Cardiovascular:      Rate and Rhythm: Normal rate and regular rhythm.      Heart sounds: Normal heart sounds.   Pulmonary:      Effort: Pulmonary effort is normal.      Breath sounds: Normal breath sounds.   Abdominal:      General: Bowel sounds are normal.      Palpations: Abdomen is soft.      Tenderness: There is abdominal tenderness in the right lower quadrant and suprapubic area. There is right CVA tenderness. There is no guarding or rebound.   Skin:     General: Skin is warm and dry.   Neurological:      Mental Status: She is alert and oriented to person, place, and time.

## 2021-02-04 NOTE — LETTER
"My Asthma Action Plan    Name: Maria Victoria Arcos   YOB: 1950  Date: 2/4/2021   My doctor: Gonzales Laws MD   My clinic: [facility]        My Rescue Medicine:   Albuterol inhaler (Proair/Ventolin/Proventil HFA)  2-4 puffs EVERY 4 HOURS as needed. Use a spacer if recommended by your provider.   My Asthma Severity:   { :804049::\"Intermittent / Exercise Induced\"}  Know your asthma triggers: { :457619}  None          GREEN ZONE   Good Control    I feel good    No cough or wheeze    Can work, sleep and play without asthma symptoms       Take your asthma control medicine every day.     1. If exercise triggers your asthma, take your rescue medication    15 minutes before exercise or sports, and    During exercise if you have asthma symptoms  2. Spacer to use with inhaler: If you have a spacer, make sure to use it with your inhaler             YELLOW ZONE Getting Worse  I have ANY of these:    I do not feel good    Cough or wheeze    Chest feels tight    Wake up at night   1. Keep taking your Green Zone medications  2. Start taking your rescue medicine:    every 20 minutes for up to 1 hour. Then every 4 hours for 24-48 hours.  3. If you stay in the Yellow Zone for more than 12-24 hours, contact your doctor.  4. If you do not return to the Green Zone in 12-24 hours or you get worse, start taking your oral steroid medicine if prescribed by your provider.           RED ZONE Medical Alert - Get Help  I have ANY of these:    I feel awful    Medicine is not helping    Breathing getting harder    Trouble walking or talking    Nose opens wide to breathe       1. Take your rescue medicine NOW  2. If your provider has prescribed an oral steroid medicine, start taking it NOW  3. Call your doctor NOW  4. If you are still in the Red Zone after 20 minutes and you have not reached your doctor:    Take your rescue medicine again and    Call 911 or go to the emergency room right away    See your " regular doctor within 2 weeks of an Emergency Room or Urgent Care visit for follow-up treatment.          Annual Reminders:  Meet with Asthma Educator,  Flu Shot in the Fall, consider Pneumonia Vaccination for patients with asthma (aged 19 and older).    Pharmacy:    [id number] - [place] - [address] AT [facility] [address] & [facility] - [place] [address]    Electronically signed by Gonzales Laws MD   Date: 02/04/21                    Asthma Triggers  How To Control Things That Make Your Asthma Worse    Triggers are things that make your asthma worse.  Look at the list below to help you find your triggers and   what you can do about them. You can help prevent asthma flare-ups by staying away from your triggers.      Trigger                                                          What you can do   Cigarette Smoke  Tobacco smoke can make asthma worse. Do not allow smoking in your home, car or around you.  Be sure no one smokes at a child s day care or school.  If you smoke, ask your health care provider for ways to help you quit.  Ask family members to quit too.  Ask your health care provider for a referral to Quit Plan to help you quit smoking, or call 4-690-668-PLAN.     Colds, Flu, Bronchitis  These are common triggers of asthma. Wash your hands often.  Don t touch your eyes, nose or mouth.  Get a flu shot every year.     Dust Mites  These are tiny bugs that live in cloth or carpet. They are too small to see. Wash sheets and blankets in hot water every week.   Encase pillows and mattress in dust mite proof covers.  Avoid having carpet if you can. If you have carpet, vacuum weekly.   Use a dust mask and HEPA vacuum.   Pollen and Outdoor Mold  Some people are allergic to trees, grass, or weed pollen, or molds. Try to keep your windows closed.  Limit time out doors when pollen count is high.   Ask you health care provider about  taking medicine during allergy season.     Animal Dander  Some people are allergic to skin flakes, urine or saliva from pets with fur or feathers. Keep pets with fur or feathers out of your home.    If you can t keep the pet outdoors, then keep the pet out of your bedroom.  Keep the bedroom door closed.  Keep pets off cloth furniture and away from stuffed toys.     Mice, Rats, and Cockroaches  Some people are allergic to the waste from these pests.   Cover food and garbage.  Clean up spills and food crumbs.  Store grease in the refrigerator.   Keep food out of the bedroom.   Indoor Mold  This can be a trigger if your home has high moisture. Fix leaking faucets, pipes, or other sources of water.   Clean moldy surfaces.  Dehumidify basement if it is damp and smelly.   Smoke, Strong Odors, and Sprays  These can reduce air quality. Stay away from strong odors and sprays, such as perfume, powder, hair spray, paints, smoke incense, paint, cleaning products, candles and new carpet.   Exercise or Sports  Some people with asthma have this trigger. Be active!  Ask your doctor about taking medicine before sports or exercise to prevent symptoms.    Warm up for 5-10 minutes before and after sports or exercise.     Other Triggers of Asthma  Cold air:  Cover your nose and mouth with a scarf.  Sometimes laughing or crying can be a trigger.  Some medicines and food can trigger asthma.

## 2021-02-05 LAB
BACTERIA SPEC CULT: ABNORMAL
BACTERIA SPEC CULT: ABNORMAL
Lab: ABNORMAL
SPECIMEN SOURCE: ABNORMAL

## 2021-02-05 ASSESSMENT — ASTHMA QUESTIONNAIRES: ACT_TOTALSCORE: 22

## 2021-02-06 ENCOUNTER — HOSPITAL ENCOUNTER (EMERGENCY)
Facility: CLINIC | Age: 71
Discharge: HOME | End: 2021-02-06
Attending: EMERGENCY MEDICINE | Admitting: EMERGENCY MEDICINE
Payer: MEDICARE

## 2021-02-06 ENCOUNTER — APPOINTMENT (OUTPATIENT)
Dept: CT IMAGING | Facility: CLINIC | Age: 71
End: 2021-02-06
Attending: EMERGENCY MEDICINE
Payer: MEDICARE

## 2021-02-06 VITALS
SYSTOLIC BLOOD PRESSURE: 145 MMHG | DIASTOLIC BLOOD PRESSURE: 79 MMHG | HEART RATE: 58 BPM | OXYGEN SATURATION: 99 % | TEMPERATURE: 98.8 F | RESPIRATION RATE: 18 BRPM

## 2021-02-06 DIAGNOSIS — R11.0 NAUSEA: ICD-10-CM

## 2021-02-06 DIAGNOSIS — Z87.440 H/O URINARY TRACT INFECTION: ICD-10-CM

## 2021-02-06 DIAGNOSIS — R10.9 RIGHT FLANK PAIN: ICD-10-CM

## 2021-02-06 DIAGNOSIS — E87.1 HYPONATREMIA: ICD-10-CM

## 2021-02-06 LAB
ALBUMIN UR-MCNC: NEGATIVE MG/DL
AMORPH CRY #/AREA URNS HPF: ABNORMAL /HPF
ANION GAP SERPL CALCULATED.3IONS-SCNC: 8 MMOL/L (ref 3–14)
APPEARANCE UR: CLEAR
BASOPHILS # BLD AUTO: 0 10E9/L (ref 0–0.2)
BASOPHILS NFR BLD AUTO: 0.5 %
BILIRUB UR QL STRIP: NEGATIVE
BUN SERPL-MCNC: 14 MG/DL (ref 7–30)
CALCIUM SERPL-MCNC: 10.1 MG/DL (ref 8.5–10.1)
CHLORIDE SERPL-SCNC: 95 MMOL/L (ref 94–109)
CO2 SERPL-SCNC: 27 MMOL/L (ref 20–32)
COLOR UR AUTO: ABNORMAL
CREAT SERPL-MCNC: 0.88 MG/DL (ref 0.52–1.04)
DIFFERENTIAL METHOD BLD: ABNORMAL
EOSINOPHIL # BLD AUTO: 0.2 10E9/L (ref 0–0.7)
EOSINOPHIL NFR BLD AUTO: 2.7 %
ERYTHROCYTE [DISTWIDTH] IN BLOOD BY AUTOMATED COUNT: 12.9 % (ref 10–15)
GFR SERPL CREATININE-BSD FRML MDRD: 66 ML/MIN/{1.73_M2}
GLUCOSE SERPL-MCNC: 99 MG/DL (ref 70–99)
GLUCOSE UR STRIP-MCNC: NEGATIVE MG/DL
HCT VFR BLD AUTO: 32.2 % (ref 35–47)
HGB BLD-MCNC: 10.3 G/DL (ref 11.7–15.7)
HGB UR QL STRIP: NEGATIVE
IMM GRANULOCYTES # BLD: 0.1 10E9/L (ref 0–0.4)
IMM GRANULOCYTES NFR BLD: 1.1 %
KETONES UR STRIP-MCNC: NEGATIVE MG/DL
LACTATE BLD-SCNC: 1.2 MMOL/L (ref 0.7–2)
LEUKOCYTE ESTERASE UR QL STRIP: NEGATIVE
LYMPHOCYTES # BLD AUTO: 1.6 10E9/L (ref 0.8–5.3)
LYMPHOCYTES NFR BLD AUTO: 18.2 %
MCH RBC QN AUTO: 27.9 PG (ref 26.5–33)
MCHC RBC AUTO-ENTMCNC: 32 G/DL (ref 31.5–36.5)
MCV RBC AUTO: 87 FL (ref 78–100)
MONOCYTES # BLD AUTO: 0.6 10E9/L (ref 0–1.3)
MONOCYTES NFR BLD AUTO: 6.5 %
NEUTROPHILS # BLD AUTO: 6.1 10E9/L (ref 1.6–8.3)
NEUTROPHILS NFR BLD AUTO: 71 %
NITRATE UR QL: NEGATIVE
NRBC # BLD AUTO: 0 10*3/UL
NRBC BLD AUTO-RTO: 0 /100
PH UR STRIP: 7.5 PH (ref 5–7)
PLATELET # BLD AUTO: 332 10E9/L (ref 150–450)
POTASSIUM SERPL-SCNC: 3.5 MMOL/L (ref 3.4–5.3)
RBC # BLD AUTO: 3.69 10E12/L (ref 3.8–5.2)
RBC #/AREA URNS AUTO: 1 /HPF (ref 0–2)
SODIUM SERPL-SCNC: 130 MMOL/L (ref 133–144)
SOURCE: ABNORMAL
SP GR UR STRIP: 1.02 (ref 1–1.03)
SQUAMOUS #/AREA URNS AUTO: <1 /HPF (ref 0–1)
UROBILINOGEN UR STRIP-MCNC: NORMAL MG/DL (ref 0–2)
WBC # BLD AUTO: 8.6 10E9/L (ref 4–11)
WBC #/AREA URNS AUTO: 2 /HPF (ref 0–5)

## 2021-02-06 PROCEDURE — 36415 COLL VENOUS BLD VENIPUNCTURE: CPT

## 2021-02-06 PROCEDURE — 74176 CT ABD & PELVIS W/O CONTRAST: CPT | Mod: ME

## 2021-02-06 PROCEDURE — 87040 BLOOD CULTURE FOR BACTERIA: CPT | Performed by: EMERGENCY MEDICINE

## 2021-02-06 PROCEDURE — 80048 BASIC METABOLIC PNL TOTAL CA: CPT | Performed by: EMERGENCY MEDICINE

## 2021-02-06 PROCEDURE — 85025 COMPLETE CBC W/AUTO DIFF WBC: CPT | Performed by: EMERGENCY MEDICINE

## 2021-02-06 PROCEDURE — 250N000013 HC RX MED GY IP 250 OP 250 PS 637: Performed by: EMERGENCY MEDICINE

## 2021-02-06 PROCEDURE — 96360 HYDRATION IV INFUSION INIT: CPT

## 2021-02-06 PROCEDURE — 81001 URINALYSIS AUTO W/SCOPE: CPT | Performed by: EMERGENCY MEDICINE

## 2021-02-06 PROCEDURE — 83605 ASSAY OF LACTIC ACID: CPT | Performed by: EMERGENCY MEDICINE

## 2021-02-06 PROCEDURE — 258N000003 HC RX IP 258 OP 636: Performed by: EMERGENCY MEDICINE

## 2021-02-06 PROCEDURE — 99284 EMERGENCY DEPT VISIT MOD MDM: CPT | Mod: 25

## 2021-02-06 RX ORDER — ONDANSETRON 4 MG/1
4 TABLET, ORALLY DISINTEGRATING ORAL EVERY 8 HOURS PRN
Qty: 10 TABLET | Refills: 0 | Status: SHIPPED | OUTPATIENT
Start: 2021-02-06 | End: 2021-02-10

## 2021-02-06 RX ORDER — OXYCODONE HYDROCHLORIDE 5 MG/1
5 TABLET ORAL
Status: COMPLETED | OUTPATIENT
Start: 2021-02-06 | End: 2021-02-06

## 2021-02-06 RX ORDER — SODIUM CHLORIDE 9 MG/ML
INJECTION, SOLUTION INTRAVENOUS CONTINUOUS
Status: DISCONTINUED | OUTPATIENT
Start: 2021-02-06 | End: 2021-02-06 | Stop reason: HOSPADM

## 2021-02-06 RX ADMIN — SODIUM CHLORIDE 1000 ML: 9 INJECTION, SOLUTION INTRAVENOUS at 10:47

## 2021-02-06 RX ADMIN — OXYCODONE HYDROCHLORIDE 5 MG: 5 TABLET ORAL at 12:41

## 2021-02-06 ASSESSMENT — ENCOUNTER SYMPTOMS
COLOR CHANGE: 0
FEVER: 1
HEMATURIA: 1
BLOOD IN STOOL: 0
NAUSEA: 1
ARTHRALGIAS: 1
CHILLS: 1
FLANK PAIN: 1
COUGH: 0
DIARRHEA: 1
JOINT SWELLING: 0
VOMITING: 0
DYSURIA: 1
FREQUENCY: 1

## 2021-02-06 NOTE — ED NOTES
"Pt returned back from CT, IV fluids completed and hooked up to BP and O2 sat monitoring. Pt was asking about her home pain medication which she is due for, states it is \"Percocet without the Tylenol\" and a muscle relaxer, but she says she doesn't need the muscle relaxer right now.     Also mentioned that she \"needed a blood gas drawn\", do not see this order so will ask the provider.  "

## 2021-02-06 NOTE — ED TRIAGE NOTES
ABCs intact. Pt had R knee replacement 1/26. Pt is on lovenox. Pt had an US last week to r/o dvt. Pt c/o fever, chills, shaking. Low grade fever today. Chills and shaking for the past few days. Pt was dx with a UTI. Not on antibiotics yet.

## 2021-02-06 NOTE — ED PROVIDER NOTES
History   Chief Complaint:  Post-op Problem     HPI   Maria Victoria Arcos is a 70 year old female with 11 days status post right knee replacement with a known UTI diagnosed earlier in the week status post 2 days of Bactrim therapy who presents with ongoing nausea, chills, grade fever of 99.6 today, and ongoing frequency and dysuria, although improved from initial onset of symptoms.  At the time of presentation to outpatient clinic, she also had hematuria.  She thinks she still might have hematuria although it is improved.  She notes she was told to come to the emergency department for symptoms that worsen.  Notes the knee seems to be healing appropriately.  She has ongoing swelling and occasional intermittent pain but denies any increased swelling, significant increased redness, discharge from the wound, or increased pain.  She denies vomiting.  She has had loose stools but nothing black or bloody.  She notes she has pain in her right flank region that was present when she was diagnosed with a UTI but has not gone away.  She took Azo yesterday at 4 PM.  She last had Tylenol at 8 AM this morning.  She denies any respiratory symptoms.    Urine Microscopic from 02/04/2021:  WBC 10-25(A), RBC >100(A), Bacteria Few(A) o/w WNL    UA reflex to Microscopic from 02/04/2021:   Blood Large (A), pH 8.0(H), Protein Albumin 100(A), Leukocyte Esterase Small(A) o/w WNL    Review of Systems   Constitutional: Positive for chills and fever.   Respiratory: Negative for cough.    Gastrointestinal: Positive for diarrhea and nausea. Negative for blood in stool and vomiting.   Genitourinary: Positive for dysuria, flank pain, frequency and hematuria.   Musculoskeletal: Positive for arthralgias. Negative for joint swelling.        Status post knee arthroplasty   Skin: Negative for color change.        Denies drainage from incision site from knee arthroplasty   All other systems reviewed and are negative.    Allergies:  Advicor  Meperidine  Hcl  Methocarbamol  Neomycin-Polymyxin-Dexameth    Medications:  Norvasc  Aspirin, 81 mg  Tenormin  Folic Acid  Hydrodiuril  Klor-Con M  Crestor  Valtrex     Past Medical History:    Asthma   Coagulation disorder   DVT of upper extremity   Essential hypertension  Essential tremor   Generalized osteoarthrosis   Hyperlipidemia   Sleep apnea   Chronically low serum potassium   Arthritis of the knee, right   GERD   Cranial third nerve paresis   Factor V Leiden mutation  Basal cell carcinoma of the face  Impaired fasting glucose  Adhesion peritoneal  Metabolic syndrome   Abdominal tumor     Past Surgical History:    Appendectomy  Arthroscopy knee  Carpal tunnel release  Cholecystectomy  D&C  Eye surgery  Foot surgery, left  Herniorrhaphy ventral  Hysterectomy, SMITA one ovary   Cystourethroscopy  Incise finger tendon sheath, right middle finger  Thumb joint replacements, bilateral    Family History:    CAD  Diabetes  Hypertension  Cerebrovascular Disease   Parkinson's disease  Obesity  Diverticulitis  Heart disease  Breast cancer  Hyperlipidemia  Blood disease  Arthritis  Skin cancer  Colon cancer  Dementia  Sleep apnea    Social History:  PCP is: Lissa Brasher Presents alone.     Physical Exam     Patient Vitals for the past 24 hrs:   BP Temp Temp src Pulse Resp SpO2   02/06/21 1230 (!) 145/79 -- -- -- -- 99 %   02/06/21 1110 -- -- -- -- -- 100 %   02/06/21 1100 130/65 -- -- 58 -- 99 %   02/06/21 1045 128/75 -- -- -- -- --   02/06/21 1030 (!) 153/67 -- -- -- -- --   02/06/21 1015 123/83 98.8  F (37.1  C) Oral 60 18 99 %       Physical Exam  General: Adult female sitting upright  Eyes: PERRL, Conjunctive within normal limits.  No scleral icterus.  ENT: Moist mucous membranes, oropharynx clear.   CV: Normal S1S2, no murmur, rub or gallop. Regular rate and rhythm  Resp: Clear to auscultation bilaterally, no wheezes, rales or rhonchi. Normal respiratory effort.  GI: Abdomen is soft, nontender and nondistended. No palpable  masses. No rebound or guarding.  Mild CVA tenderness on the right to percussion.  MSK: Right leg edema.  No focal tenderness to palpation.  Normal active range of motion.  Skin: Warm and dry.  Extensive ecchymoses over the posterior right upper leg and inner thigh.  Surgical site is clean dry and intact.  Normal surrounding erythema.  No discharge.  No focal tenderness over the incision site.  No other rashes noted.  Neuro: Alert and oriented. Responds appropriately to all questions and commands. No focal findings appreciated. Normal muscle tone.  Psych: Normal mood and affect. Pleasant.    Emergency Department Course     Imaging:  CT Abdomen Pelvis w/o Contrast  1. No radiodense kidney/ureteral stone or hydronephrosis in either kidney.  2. Colonic diverticulosis without CT evidence of acute diverticulitis.  3. Moderate amount of stool throughout the colon, nonspecific, can be seen with constipation.  Reading per radiology    Laboratory:  CBC: WBC 8.6, HGB 10.3(L),   BMP: (L) o/w WNL (Creatinine 0.88)  Lactic acid (Resulted 1057): 1.2  Blood culture: In process  Blood culture x2: In process    UA with Microscopic: pH 7.5(H), Amorphous Crystals Moderate (A) o/w WNL    Emergency Department Course:  Reviewed:  I reviewed the patient's nursing notes, vitals, past medical records, Care Everywhere.     Assessments:  1015: I performed an exam of the patient as documented above.   1219: Rechecked and updated. She is well appearing. Requests her postop pain control medication.    Interventions:  1047 NS 1,000 mL IV  1241 Roxicodone 5 mg Oral    Disposition:  The patient was discharged to home.     Impression & Plan      Medical Decision Making:  Maria Victoria Arcos is a 70 year old female who presents with nausea, diarrhea, ongoing right flank/low back pain in the setting of recently diagnosed UTI. She has no fever. She is not toxic appearing. There is no focus of infection on examination.  Given the recent UTI,  pyelonephritis or ureteral obstruction was considered.  The back pain could also be musculoskeletal.  She had no abdominal tenderness.  Loose stools this morning seem unlikely to reflect bacterial or viral cause.  The postoperative knee appears to be healing appropriately with no obvious signs of infection at this time.  She also has no leukocytosis. She has normal renal function.  She has mild anemia, improved since surgery, and mild hyponatremia, perhaps secondary to poor oral intake.  She was given rehydration while in the ED and her post surgical pain meds.  Urinalysis here is unremarkable.  Bactrim is likely an appropriate antibiotic for her UTI given her improvement in urinary symptoms and the normal looking urine today with no CT findings suggestive of pyelonephritis, urinary obstruction or alternative cause for her symptoms.  Do not feel she would benefit from admission nor IV antibiotics at this time.  Patient is hemodynamically stable in ED. Plan is home for recheck by primary care physician in 2 to 3 days or return to ED at that time.  Return for fevers greater than 102, increasing pain, other new symptoms develop.  All questions were answered and the patient felt comfortable with this plan..     Diagnosis:    ICD-10-CM    1. Nausea  R11.0 Blood culture ONE site   2. Right flank pain  R10.9    3. Hyponatremia  E87.1    4. H/O urinary tract infection  Z87.440        Discharge Medications:  New Prescriptions    ONDANSETRON (ZOFRAN ODT) 4 MG ODT TAB    Take 1 tablet (4 mg) by mouth every 8 hours as needed for nausea or vomiting     Scribe Disclosure:  Josefina SALDAÑA, am serving as a scribe at 10:19 AM on 2/6/2021 to document services personally performed by Karina Moe MD based on my observations and the provider's statements to me.            Karina Moe MD  02/06/21 1342

## 2021-02-07 ENCOUNTER — MYC MEDICAL ADVICE (OUTPATIENT)
Dept: FAMILY MEDICINE | Facility: CLINIC | Age: 71
End: 2021-02-07

## 2021-02-09 ENCOUNTER — THERAPY VISIT (OUTPATIENT)
Dept: PHYSICAL THERAPY | Facility: CLINIC | Age: 71
End: 2021-02-09
Payer: MEDICARE

## 2021-02-09 ENCOUNTER — MYC MEDICAL ADVICE (OUTPATIENT)
Dept: FAMILY MEDICINE | Facility: CLINIC | Age: 71
End: 2021-02-09

## 2021-02-09 DIAGNOSIS — Z47.89 AFTERCARE FOLLOWING SURGERY OF THE MUSCULOSKELETAL SYSTEM: ICD-10-CM

## 2021-02-09 DIAGNOSIS — Z96.651 STATUS POST TOTAL RIGHT KNEE REPLACEMENT: ICD-10-CM

## 2021-02-09 PROCEDURE — 97010 HOT OR COLD PACKS THERAPY: CPT | Mod: GP | Performed by: PHYSICAL THERAPIST

## 2021-02-09 PROCEDURE — 97112 NEUROMUSCULAR REEDUCATION: CPT | Mod: GP | Performed by: PHYSICAL THERAPIST

## 2021-02-09 PROCEDURE — 97110 THERAPEUTIC EXERCISES: CPT | Mod: GP | Performed by: PHYSICAL THERAPIST

## 2021-02-10 ENCOUNTER — OFFICE VISIT (OUTPATIENT)
Dept: FAMILY MEDICINE | Facility: CLINIC | Age: 71
End: 2021-02-10

## 2021-02-10 VITALS
WEIGHT: 231.6 LBS | TEMPERATURE: 98.1 F | HEIGHT: 63 IN | OXYGEN SATURATION: 98 % | SYSTOLIC BLOOD PRESSURE: 124 MMHG | DIASTOLIC BLOOD PRESSURE: 70 MMHG | BODY MASS INDEX: 41.04 KG/M2 | RESPIRATION RATE: 20 BRPM | HEART RATE: 62 BPM

## 2021-02-10 DIAGNOSIS — N30.91 CYSTITIS WITH HEMATURIA: Primary | ICD-10-CM

## 2021-02-10 DIAGNOSIS — D62 ACUTE POSTHEMORRHAGIC ANEMIA: ICD-10-CM

## 2021-02-10 DIAGNOSIS — Z98.890: ICD-10-CM

## 2021-02-10 DIAGNOSIS — E87.1 HYPONATREMIA: ICD-10-CM

## 2021-02-10 DIAGNOSIS — D68.51 FACTOR V LEIDEN MUTATION (H): ICD-10-CM

## 2021-02-10 LAB
ALBUMIN (URINE): NORMAL MG/DL
ALBUMIN SERPL-MCNC: 4.3 G/DL (ref 3.6–5.1)
ALBUMIN/GLOB SERPL: 1.6 {RATIO} (ref 1–2.5)
ALP SERPL-CCNC: 59 U/L (ref 33–130)
ALT 1742-6: 9 U/L (ref 0–32)
APPEARANCE UR: CLEAR
AST 1920-8: 13 U/L (ref 0–35)
BACTERIA, UR: NORMAL
BILIRUB SERPL-MCNC: 0.6 MG/DL (ref 0.2–1.2)
BILIRUB UR QL: NORMAL
BUN SERPL-MCNC: 21 MG/DL (ref 7–25)
BUN/CREATININE RATIO: 20.4 (ref 6–22)
CALCIUM SERPL-MCNC: 10.1 MG/DL (ref 8.6–10.3)
CASTS/LPF: 0
CHLORIDE SERPLBLD-SCNC: 94.7 MMOL/L (ref 98–110)
CO2 SERPL-SCNC: 27 MMOL/L (ref 20–32)
COLOR UR: YELLOW
CREAT SERPL-MCNC: 1.03 MG/DL (ref 0.6–1.3)
EP/HPF: NORMAL
GLOBULIN, CALCULATED - QUEST: 2.7 (ref 1.9–3.7)
GLUCOSE SERPL-MCNC: 98 MG/DL (ref 60–99)
GLUCOSE URINE: NORMAL MG/DL
HEMOGLOBIN: 9.8 G/DL (ref 11.7–15.7)
HGB UR QL: NORMAL
KETONES UR QL: NORMAL MG/DL
LEUKOCYTE ESTERASE - QUEST: NORMAL
MISC.: 0
NITRITE UR QL STRIP: NORMAL
PH UR STRIP: 7.5 PH (ref 5–7)
POTASSIUM SERPL-SCNC: 4.77 MMOL/L (ref 3.5–5.3)
PROT SERPL-MCNC: 7 G/DL (ref 6.1–8.1)
RBC, UR MICRO: NORMAL (ref ?–2)
SODIUM SERPL-SCNC: 131.9 MMOL/L (ref 135–146)
SP. GRAVITY: 1.02
UROBILINOGEN UR QL STRIP: 0.2 EU/DL (ref 0.2–1)
WBC, UR MICRO: NORMAL (ref ?–2)

## 2021-02-10 PROCEDURE — 80053 COMPREHEN METABOLIC PANEL: CPT | Performed by: FAMILY MEDICINE

## 2021-02-10 PROCEDURE — 36415 COLL VENOUS BLD VENIPUNCTURE: CPT | Performed by: FAMILY MEDICINE

## 2021-02-10 PROCEDURE — 85018 HEMOGLOBIN: CPT | Performed by: FAMILY MEDICINE

## 2021-02-10 PROCEDURE — 99215 OFFICE O/P EST HI 40 MIN: CPT | Performed by: FAMILY MEDICINE

## 2021-02-10 PROCEDURE — 81001 URINALYSIS AUTO W/SCOPE: CPT | Performed by: FAMILY MEDICINE

## 2021-02-10 RX ORDER — HYDROXYZINE PAMOATE 25 MG/1
CAPSULE ORAL
COMMUNITY
Start: 2020-12-09 | End: 2021-02-10

## 2021-02-10 ASSESSMENT — MIFFLIN-ST. JEOR: SCORE: 1544.66

## 2021-02-10 NOTE — PROGRESS NOTES
"    Assessment & Plan     Cystitis with hematuria  Finishing out Bactrim  - HCL  Urinalysis, Routine (BFP)    H/O joint surgery  Knee replacement doing well/ anemia after    Hyponatremia  Here to recheck/ see Er visit  - Comprehensive Metabolic Panel (BFP)  - VENOUS COLLECTION    Review of external notes as documented elsewhere in note  Off Lovenox and ambulation going well. Taking a regular aspirin twice daily with food  [id number]     BMI:   Estimated body mass index is 41.03 kg/m  as calculated from the following:    Height as of this encounter: 1.6 m (5' 3\").    Weight as of this encounter: 105.1 kg (231 lb 9.6 oz).   Weight management plan: Discussed healthy diet and exercise guidelines    CONSULTATION/REFERRAL to orthopedics  FUTURE APPOINTMENTS:       - Follow-up visit in 1 month fasting  Work on weight loss  Regular exercise        Lissa Brasher MD  [facility]    Subjective   Maria Victoria is a 70 year old who presents for the following health issues :    HPI Hospitalized for knee replacement/ seen after in ER for UTI and hyponatremia        Hospital Follow-up Visit:    Hospital/Nursing Home/IP Rehab Facility: [facility]  Date of Admission: 02/06/21  Reason(s) for Admission: flank pain, dehydration, UTI      Was your hospitalization related to COVID-19? No   Problems taking medications regularly:  None  Medication changes since discharge: None  Problems adhering to non-medication therapy:  None    Summary of hospitalization:  [facility] discharge summary reviewed  Diagnostic Tests/Treatments reviewed.  Follow up needed: lab work  Other Healthcare Providers Involved in Patient s Care:         Specialist appointment - orthopedists  Update since discharge: improved.   Post Discharge Medication Reconciliation: discharge medications reconciled, continue medications without change.  Plan of care communicated with patient            Review of Systems   Constitutional, HEENT, " "cardiovascular, pulmonary, gi and gu systems are negative, except as otherwise noted.      Objective    /70 (BP Location: Right arm, Patient Position: Sitting, Cuff Size: Adult Large)   Pulse 62   Temp 98.1  F (36.7  C) (Oral)   Resp 20   Ht 1.6 m (5' 3\")   Wt 105.1 kg (231 lb 9.6 oz)   SpO2 98%   BMI 41.03 kg/m    Body mass index is 41.03 kg/m .  Physical Exam   GENERAL: healthy, alert and no distress  NECK: no adenopathy, no asymmetry, masses, or scars and thyroid normal to palpation  RESP: lungs clear to auscultation - no rales, rhonchi or wheezes  CV: regular rate and rhythm, normal S1 S2, no S3 or S4, no murmur, click or rub, no peripheral edema and peripheral pulses strong  ABDOMEN: soft, nontender, no hepatosplenomegaly, no masses and bowel sounds normal  MS: no gross musculoskeletal defects noted, no edema    UA: WNL  HGB: down to 9.8 gm/dl    I spent a total of 40 minutes face-to-face with Maria Victoria Arcos during today's office visit or reviewing her chart.  Over 50% of this time was spent counseling the patient and/or coordinating care regarding hospitalization, ER visit and lab results.  See note for details.          "

## 2021-02-12 ENCOUNTER — THERAPY VISIT (OUTPATIENT)
Dept: PHYSICAL THERAPY | Facility: CLINIC | Age: 71
End: 2021-02-12
Payer: MEDICARE

## 2021-02-12 DIAGNOSIS — Z47.89 AFTERCARE FOLLOWING SURGERY OF THE MUSCULOSKELETAL SYSTEM: ICD-10-CM

## 2021-02-12 DIAGNOSIS — Z96.651 STATUS POST TOTAL RIGHT KNEE REPLACEMENT: ICD-10-CM

## 2021-02-12 LAB
BACTERIA SPEC CULT: NO GROWTH
SPECIMEN SOURCE: NORMAL

## 2021-02-12 PROCEDURE — 97112 NEUROMUSCULAR REEDUCATION: CPT | Mod: GP | Performed by: PHYSICAL THERAPIST

## 2021-02-12 PROCEDURE — 97110 THERAPEUTIC EXERCISES: CPT | Mod: GP | Performed by: PHYSICAL THERAPIST

## 2021-02-12 PROCEDURE — 97010 HOT OR COLD PACKS THERAPY: CPT | Mod: GP | Performed by: PHYSICAL THERAPIST

## 2021-02-16 ENCOUNTER — DOCUMENTATION ONLY (OUTPATIENT)
Dept: OTHER | Facility: CLINIC | Age: 71
End: 2021-02-16

## 2021-02-16 ENCOUNTER — THERAPY VISIT (OUTPATIENT)
Dept: PHYSICAL THERAPY | Facility: CLINIC | Age: 71
End: 2021-02-16
Payer: MEDICARE

## 2021-02-16 ENCOUNTER — AMBULATORY - HEALTHEAST (OUTPATIENT)
Dept: OTHER | Facility: CLINIC | Age: 71
End: 2021-02-16

## 2021-02-16 DIAGNOSIS — Z47.89 AFTERCARE FOLLOWING SURGERY OF THE MUSCULOSKELETAL SYSTEM: ICD-10-CM

## 2021-02-16 DIAGNOSIS — Z96.651 STATUS POST TOTAL RIGHT KNEE REPLACEMENT: ICD-10-CM

## 2021-02-16 PROCEDURE — 97110 THERAPEUTIC EXERCISES: CPT | Mod: GP | Performed by: PHYSICAL THERAPIST

## 2021-02-16 PROCEDURE — 97530 THERAPEUTIC ACTIVITIES: CPT | Mod: GP | Performed by: PHYSICAL THERAPIST

## 2021-02-19 ENCOUNTER — THERAPY VISIT (OUTPATIENT)
Dept: PHYSICAL THERAPY | Facility: CLINIC | Age: 71
End: 2021-02-19
Payer: MEDICARE

## 2021-02-19 DIAGNOSIS — Z47.89 AFTERCARE FOLLOWING SURGERY OF THE MUSCULOSKELETAL SYSTEM: ICD-10-CM

## 2021-02-19 DIAGNOSIS — Z96.651 STATUS POST TOTAL RIGHT KNEE REPLACEMENT: ICD-10-CM

## 2021-02-19 PROCEDURE — 97110 THERAPEUTIC EXERCISES: CPT | Mod: GP | Performed by: PHYSICAL THERAPIST

## 2021-02-19 PROCEDURE — 97530 THERAPEUTIC ACTIVITIES: CPT | Mod: GP | Performed by: PHYSICAL THERAPIST

## 2021-02-22 DIAGNOSIS — I10 ESSENTIAL HYPERTENSION, BENIGN: ICD-10-CM

## 2021-02-22 DIAGNOSIS — R60.1 GENERALIZED EDEMA: ICD-10-CM

## 2021-02-23 ENCOUNTER — THERAPY VISIT (OUTPATIENT)
Dept: PHYSICAL THERAPY | Facility: CLINIC | Age: 71
End: 2021-02-23
Payer: MEDICARE

## 2021-02-23 DIAGNOSIS — Z96.651 STATUS POST TOTAL RIGHT KNEE REPLACEMENT: ICD-10-CM

## 2021-02-23 DIAGNOSIS — Z47.89 AFTERCARE FOLLOWING SURGERY OF THE MUSCULOSKELETAL SYSTEM: ICD-10-CM

## 2021-02-23 PROCEDURE — 97530 THERAPEUTIC ACTIVITIES: CPT | Mod: GP | Performed by: PHYSICAL THERAPIST

## 2021-02-23 PROCEDURE — 97010 HOT OR COLD PACKS THERAPY: CPT | Mod: GP | Performed by: PHYSICAL THERAPIST

## 2021-02-23 PROCEDURE — 97110 THERAPEUTIC EXERCISES: CPT | Mod: GP | Performed by: PHYSICAL THERAPIST

## 2021-02-23 RX ORDER — POTASSIUM CHLORIDE 1500 MG/1
TABLET, EXTENDED RELEASE ORAL
Qty: 90 TABLET | Refills: 1 | COMMUNITY
Start: 2021-02-23

## 2021-02-23 NOTE — TELEPHONE ENCOUNTER
Maria Victoria Arcos is requesting a refill of:    Refused Prescriptions:                       Disp   Refills    potassium chloride ER (KLOR-CON M) 20 MEQ *90 tab*1        Sig: TAKE 1 TABLET BY MOUTH DAILY  Refused By: DANELLE PERALTA  Reason for Refusal: Should already have refills on file  Reason for Refusal Comment: Refilled on 11/05/20 for 6 months by another provider      Refilled on 11/05/20 for 90 with 1 refill, should not be out until May

## 2021-02-24 ENCOUNTER — TELEPHONE (OUTPATIENT)
Dept: FAMILY MEDICINE | Facility: CLINIC | Age: 71
End: 2021-02-24

## 2021-02-26 ENCOUNTER — THERAPY VISIT (OUTPATIENT)
Dept: PHYSICAL THERAPY | Facility: CLINIC | Age: 71
End: 2021-02-26
Payer: MEDICARE

## 2021-02-26 DIAGNOSIS — Z47.89 AFTERCARE FOLLOWING SURGERY OF THE MUSCULOSKELETAL SYSTEM: ICD-10-CM

## 2021-02-26 DIAGNOSIS — Z96.651 STATUS POST TOTAL RIGHT KNEE REPLACEMENT: ICD-10-CM

## 2021-02-26 PROCEDURE — 97110 THERAPEUTIC EXERCISES: CPT | Mod: GP | Performed by: PHYSICAL THERAPIST

## 2021-02-26 PROCEDURE — 97530 THERAPEUTIC ACTIVITIES: CPT | Mod: GP | Performed by: PHYSICAL THERAPIST

## 2021-02-26 PROCEDURE — 97010 HOT OR COLD PACKS THERAPY: CPT | Mod: GP | Performed by: PHYSICAL THERAPIST

## 2021-03-02 ENCOUNTER — THERAPY VISIT (OUTPATIENT)
Dept: PHYSICAL THERAPY | Facility: CLINIC | Age: 71
End: 2021-03-02
Payer: MEDICARE

## 2021-03-02 DIAGNOSIS — Z96.651 STATUS POST TOTAL RIGHT KNEE REPLACEMENT: ICD-10-CM

## 2021-03-02 DIAGNOSIS — Z47.89 AFTERCARE FOLLOWING SURGERY OF THE MUSCULOSKELETAL SYSTEM: ICD-10-CM

## 2021-03-02 PROCEDURE — 97110 THERAPEUTIC EXERCISES: CPT | Mod: GP | Performed by: PHYSICAL THERAPIST

## 2021-03-02 PROCEDURE — 97530 THERAPEUTIC ACTIVITIES: CPT | Mod: GP | Performed by: PHYSICAL THERAPIST

## 2021-03-05 ENCOUNTER — THERAPY VISIT (OUTPATIENT)
Dept: PHYSICAL THERAPY | Facility: CLINIC | Age: 71
End: 2021-03-05
Payer: MEDICARE

## 2021-03-05 DIAGNOSIS — Z47.89 AFTERCARE FOLLOWING SURGERY OF THE MUSCULOSKELETAL SYSTEM: ICD-10-CM

## 2021-03-05 DIAGNOSIS — Z96.651 STATUS POST TOTAL RIGHT KNEE REPLACEMENT: ICD-10-CM

## 2021-03-05 PROCEDURE — 97110 THERAPEUTIC EXERCISES: CPT | Mod: GP | Performed by: PHYSICAL THERAPIST

## 2021-03-05 PROCEDURE — 97530 THERAPEUTIC ACTIVITIES: CPT | Mod: GP | Performed by: PHYSICAL THERAPIST

## 2021-03-05 ASSESSMENT — ACTIVITIES OF DAILY LIVING (ADL)
WALK: ACTIVITY IS NOT DIFFICULT
PAIN: THE SYMPTOM AFFECTS MY ACTIVITY SLIGHTLY
SQUAT: ACTIVITY IS MINIMALLY DIFFICULT
RISE FROM A CHAIR: ACTIVITY IS NOT DIFFICULT
AS_A_RESULT_OF_YOUR_KNEE_INJURY,_HOW_WOULD_YOU_RATE_YOUR_CURRENT_LEVEL_OF_DAILY_ACTIVITY?: NEARLY NORMAL
GIVING WAY, BUCKLING OR SHIFTING OF KNEE: I DO NOT HAVE THE SYMPTOM
KNEEL ON THE FRONT OF YOUR KNEE: I AM UNABLE TO DO THE ACTIVITY
STAND: ACTIVITY IS NOT DIFFICULT
STIFFNESS: THE SYMPTOM AFFECTS MY ACTIVITY MODERATELY
HOW_WOULD_YOU_RATE_THE_OVERALL_FUNCTION_OF_YOUR_KNEE_DURING_YOUR_USUAL_DAILY_ACTIVITIES?: NEARLY NORMAL
SWELLING: I HAVE THE SYMPTOM BUT IT DOES NOT AFFECT MY ACTIVITY
RAW_SCORE: 47
LIMPING: I HAVE THE SYMPTOM BUT IT DOES NOT AFFECT MY ACTIVITY
WEAKNESS: I HAVE THE SYMPTOM BUT IT DOES NOT AFFECT MY ACTIVITY
HOW_WOULD_YOU_RATE_THE_CURRENT_FUNCTION_OF_YOUR_KNEE_DURING_YOUR_USUAL_DAILY_ACTIVITIES_ON_A_SCALE_FROM_0_TO_100_WITH_100_BEING_YOUR_LEVEL_OF_KNEE_FUNCTION_PRIOR_TO_YOUR_INJURY_AND_0_BEING_THE_INABILITY_TO_PERFORM_ANY_OF_YOUR_USUAL_DAILY_ACTIVITIES?: 75
SIT WITH YOUR KNEE BENT: ACTIVITY IS MINIMALLY DIFFICULT
KNEE_ACTIVITY_OF_DAILY_LIVING_SUM: 47
GO DOWN STAIRS: ACTIVITY IS VERY DIFFICULT
GO UP STAIRS: ACTIVITY IS VERY DIFFICULT
KNEE_ACTIVITY_OF_DAILY_LIVING_SCORE: 67.14

## 2021-03-05 NOTE — LETTER
[facility] [facility] [place] [facility]  [address]  [place] [address]  [phone]    2021    Re: Maria Victoria Arcos   :   1950  MRN:  [id number]   REFERRING PHYSICIAN:   Himanshu Pedro  [facility] [place] [facility]  Date of Initial Evaluation:  2021  Visits:  Rxs Used: 10  Reason for Referral:   Aftercare following surgery of the musculoskeletal system  Status post total right knee replacement    PROGRESS  REPORT  Progress reporting period is from eval to 3/5/21.       SUBJECTIVE  Subjective changes noted by patient:  .  Subjective: Pt reports having continued improvement.  Functioning at 70% of where she wants to be.    Current pain level is  Current Pain level: 2/10.     Previous pain level was   Initial Pain level: 6/10.   Changes in function:  Yes (See Goal flowsheet attached for changes in current functional level)  Adverse reaction to treatment or activity: None    OBJECTIVE  Changes noted in objective findings:  Yes,   Objective: ROM: 0-0-113,  Strength 4/5     ASSESSMENT/PLAN  Updated problem list and treatment plan: Diagnosis 1:  S/p R TKA  Decreased ROM/flexibility - manual therapy and therapeutic exercise  Decreased strength - therapeutic exercise and therapeutic activities  Impaired muscle performance - neuro re-education  Decreased function - therapeutic activities  STG/LTGs have been met or progress has been made towards goals:  Yes (See Goal flow sheet completed today.)  Assessment of Progress: The patient's condition is improving.  The patient's condition has potential to improve.  Self Management Plans:  Patient has been instructed in a home treatment program.  Patient is independent in a home treatment program.  I have re-evaluated this patient and find that the nature, scope, duration and intensity of the therapy is appropriate for the medical condition of the patient.  Maria Victoria continues to require the following intervention to meet STG and  LTG's:  PT    Recommendations:  This patient would benefit from continued therapy.     Frequency:  1 X week, once daily  Duration:  for 4 weeks    Re: Maria Victoria Arcos   :   1950            Thank you for your referral.    INQUIRIES  Therapist: Cheng Vernon PT  [facility]  [address] [address]  Phone: [phone]  Fax: [phone]

## 2021-03-05 NOTE — PROGRESS NOTES
PROGRESS  REPORT    Progress reporting period is from eval to 3/5/21.       SUBJECTIVE  Subjective changes noted by patient:  .  Subjective: Pt reports having continued improvement.  Functioning at 70% of where she wants to be.    Current pain level is  Current Pain level: 2/10.     Previous pain level was   Initial Pain level: 6/10.   Changes in function:  Yes (See Goal flowsheet attached for changes in current functional level)  Adverse reaction to treatment or activity: None    OBJECTIVE  Changes noted in objective findings:  Yes,   Objective: ROM: 0-0-113,  Strength 4/5     ASSESSMENT/PLAN  Updated problem list and treatment plan: Diagnosis 1:  S/p R TKA  Decreased ROM/flexibility - manual therapy and therapeutic exercise  Decreased strength - therapeutic exercise and therapeutic activities  Impaired muscle performance - neuro re-education  Decreased function - therapeutic activities  STG/LTGs have been met or progress has been made towards goals:  Yes (See Goal flow sheet completed today.)  Assessment of Progress: The patient's condition is improving.  The patient's condition has potential to improve.  Self Management Plans:  Patient has been instructed in a home treatment program.  Patient is independent in a home treatment program.  I have re-evaluated this patient and find that the nature, scope, duration and intensity of the therapy is appropriate for the medical condition of the patient.  Maria Victoria continues to require the following intervention to meet STG and LTG's:  PT    Recommendations:  This patient would benefit from continued therapy.     Frequency:  1 X week, once daily  Duration:  for 4 weeks        Please refer to the daily flowsheet for treatment today, total treatment time and time spent performing 1:1 timed codes.

## 2021-03-06 DIAGNOSIS — R60.1 GENERALIZED EDEMA: ICD-10-CM

## 2021-03-06 DIAGNOSIS — I10 ESSENTIAL HYPERTENSION, BENIGN: ICD-10-CM

## 2021-03-08 ENCOUNTER — MYC MEDICAL ADVICE (OUTPATIENT)
Dept: FAMILY MEDICINE | Facility: CLINIC | Age: 71
End: 2021-03-08

## 2021-03-08 DIAGNOSIS — R60.1 GENERALIZED EDEMA: ICD-10-CM

## 2021-03-08 DIAGNOSIS — I10 ESSENTIAL HYPERTENSION, BENIGN: ICD-10-CM

## 2021-03-08 RX ORDER — AMLODIPINE BESYLATE 10 MG/1
TABLET ORAL
Qty: 90 TABLET | Refills: 0 | OUTPATIENT
Start: 2021-03-08

## 2021-03-08 RX ORDER — HYDROCHLOROTHIAZIDE 25 MG/1
TABLET ORAL
Qty: 90 TABLET | Refills: 0 | OUTPATIENT
Start: 2021-03-08

## 2021-03-08 NOTE — TELEPHONE ENCOUNTER
Received incoming refill request for  Pending Prescriptions:                       Disp   Refills    amLODIPine (NORVASC) 10 MG tablet [Pharma*90 tab*0            Sig: TAKE 1 TABLET(10 MG) BY MOUTH DAILY    hydrochlorothiazide (HYDRODIURIL) 25 MG t*90 tab*0            Sig: TAKE 1 TABLET(25 MG) BY MOUTH DAILY    Patient last had a refill of this medication on 11/5/2020 for 90 day supply. The patient was seen on 2/10/21 where labs were done. Routing to Dr. Brasher for approval or denial of refill request.

## 2021-03-08 NOTE — TELEPHONE ENCOUNTER
Patient was seen 2/2021 due to low sodium after ER visit. Needs a sodium repeat labs 3-4 weeks from that date. If she needs 30 tablets will refill/ schedule fasting visit?

## 2021-03-09 ENCOUNTER — THERAPY VISIT (OUTPATIENT)
Dept: PHYSICAL THERAPY | Facility: CLINIC | Age: 71
End: 2021-03-09
Payer: MEDICARE

## 2021-03-09 DIAGNOSIS — I10 ESSENTIAL HYPERTENSION, BENIGN: ICD-10-CM

## 2021-03-09 DIAGNOSIS — Z96.651 STATUS POST TOTAL RIGHT KNEE REPLACEMENT: ICD-10-CM

## 2021-03-09 DIAGNOSIS — R60.1 GENERALIZED EDEMA: ICD-10-CM

## 2021-03-09 DIAGNOSIS — Z47.89 AFTERCARE FOLLOWING SURGERY OF THE MUSCULOSKELETAL SYSTEM: ICD-10-CM

## 2021-03-09 PROCEDURE — 97112 NEUROMUSCULAR REEDUCATION: CPT | Mod: GP | Performed by: PHYSICAL THERAPIST

## 2021-03-09 PROCEDURE — 97110 THERAPEUTIC EXERCISES: CPT | Mod: GP | Performed by: PHYSICAL THERAPIST

## 2021-03-09 RX ORDER — AMLODIPINE BESYLATE 10 MG/1
10 TABLET ORAL DAILY
Qty: 30 TABLET | Refills: 0 | Status: SHIPPED | OUTPATIENT
Start: 2021-03-09 | End: 2021-04-07

## 2021-03-09 RX ORDER — AMLODIPINE BESYLATE 10 MG/1
TABLET ORAL
Qty: 90 TABLET | COMMUNITY
Start: 2021-03-09

## 2021-03-09 RX ORDER — HYDROCHLOROTHIAZIDE 25 MG/1
TABLET ORAL
Qty: 90 TABLET | COMMUNITY
Start: 2021-03-09

## 2021-03-09 RX ORDER — HYDROCHLOROTHIAZIDE 25 MG/1
25 TABLET ORAL DAILY
Qty: 30 TABLET | Refills: 0 | Status: SHIPPED | OUTPATIENT
Start: 2021-03-09 | End: 2021-04-07

## 2021-03-09 NOTE — TELEPHONE ENCOUNTER
I sent 30 days today to get her to that appointment. Make sure that is the right medications she needs.

## 2021-03-12 ENCOUNTER — TRANSFERRED RECORDS (OUTPATIENT)
Dept: HEALTH INFORMATION MANAGEMENT | Facility: CLINIC | Age: 71
End: 2021-03-12

## 2021-03-19 ENCOUNTER — THERAPY VISIT (OUTPATIENT)
Dept: PHYSICAL THERAPY | Facility: CLINIC | Age: 71
End: 2021-03-19
Payer: MEDICARE

## 2021-03-19 DIAGNOSIS — Z47.89 AFTERCARE FOLLOWING SURGERY OF THE MUSCULOSKELETAL SYSTEM: ICD-10-CM

## 2021-03-19 DIAGNOSIS — Z96.651 STATUS POST TOTAL RIGHT KNEE REPLACEMENT: ICD-10-CM

## 2021-03-19 PROCEDURE — 97110 THERAPEUTIC EXERCISES: CPT | Mod: GP | Performed by: PHYSICAL THERAPIST

## 2021-03-19 PROCEDURE — 97530 THERAPEUTIC ACTIVITIES: CPT | Mod: GP | Performed by: PHYSICAL THERAPIST

## 2021-04-01 ENCOUNTER — THERAPY VISIT (OUTPATIENT)
Dept: PHYSICAL THERAPY | Facility: CLINIC | Age: 71
End: 2021-04-01
Payer: MEDICARE

## 2021-04-01 DIAGNOSIS — Z47.89 AFTERCARE FOLLOWING SURGERY OF THE MUSCULOSKELETAL SYSTEM: ICD-10-CM

## 2021-04-01 DIAGNOSIS — Z96.651 STATUS POST TOTAL RIGHT KNEE REPLACEMENT: ICD-10-CM

## 2021-04-01 PROCEDURE — 97530 THERAPEUTIC ACTIVITIES: CPT | Mod: GP | Performed by: PHYSICAL THERAPIST

## 2021-04-01 PROCEDURE — 97110 THERAPEUTIC EXERCISES: CPT | Mod: GP | Performed by: PHYSICAL THERAPIST

## 2021-04-01 PROCEDURE — 97112 NEUROMUSCULAR REEDUCATION: CPT | Mod: GP | Performed by: PHYSICAL THERAPIST

## 2021-04-03 DIAGNOSIS — I10 ESSENTIAL HYPERTENSION, BENIGN: ICD-10-CM

## 2021-04-03 DIAGNOSIS — R60.1 GENERALIZED EDEMA: ICD-10-CM

## 2021-04-05 ENCOUNTER — TRANSFERRED RECORDS (OUTPATIENT)
Dept: FAMILY MEDICINE | Facility: CLINIC | Age: 71
End: 2021-04-05

## 2021-04-05 RX ORDER — HYDROCHLOROTHIAZIDE 25 MG/1
TABLET ORAL
Qty: 30 TABLET | Refills: 0 | COMMUNITY
Start: 2021-04-05

## 2021-04-05 RX ORDER — AMLODIPINE BESYLATE 10 MG/1
TABLET ORAL
Qty: 30 TABLET | Refills: 0 | COMMUNITY
Start: 2021-04-05

## 2021-04-05 NOTE — TELEPHONE ENCOUNTER
Received incoming refill request for  Pending Prescriptions:                       Disp   Refills    hydrochlorothiazide (HYDRODIURIL) 25 MG t*30 tab*0            Sig: TAKE 1 TABLET(25 MG) BY MOUTH DAILY    amLODIPine (NORVASC) 10 MG tablet [Pharma*30 tab*0            Sig: TAKE 1 TABLET(10 MG) BY MOUTH DAILY    Patient last had a refill of these medications on 3/9/21 for 30 tablets and they have an appt scheduled on 4/7/21 where full refills will be sent in.

## 2021-04-06 ENCOUNTER — TRANSFERRED RECORDS (OUTPATIENT)
Dept: FAMILY MEDICINE | Facility: CLINIC | Age: 71
End: 2021-04-06

## 2021-04-06 ENCOUNTER — TRANSFERRED RECORDS (OUTPATIENT)
Dept: HEALTH INFORMATION MANAGEMENT | Facility: CLINIC | Age: 71
End: 2021-04-06

## 2021-04-07 ENCOUNTER — OFFICE VISIT (OUTPATIENT)
Dept: FAMILY MEDICINE | Facility: CLINIC | Age: 71
End: 2021-04-07

## 2021-04-07 VITALS
HEIGHT: 63 IN | DIASTOLIC BLOOD PRESSURE: 68 MMHG | WEIGHT: 229.2 LBS | RESPIRATION RATE: 20 BRPM | SYSTOLIC BLOOD PRESSURE: 138 MMHG | TEMPERATURE: 98 F | HEART RATE: 64 BPM | BODY MASS INDEX: 40.61 KG/M2

## 2021-04-07 DIAGNOSIS — Z79.899 ENCOUNTER FOR LONG-TERM (CURRENT) USE OF MEDICATIONS: ICD-10-CM

## 2021-04-07 DIAGNOSIS — E72.11 HYPERHOMOCYSTEINEMIA (H): ICD-10-CM

## 2021-04-07 DIAGNOSIS — D64.9 POSTOPERATIVE ANEMIA: ICD-10-CM

## 2021-04-07 DIAGNOSIS — Z71.89 ACP (ADVANCE CARE PLANNING): ICD-10-CM

## 2021-04-07 DIAGNOSIS — I10 ESSENTIAL HYPERTENSION, BENIGN: ICD-10-CM

## 2021-04-07 DIAGNOSIS — R60.1 GENERALIZED EDEMA: ICD-10-CM

## 2021-04-07 DIAGNOSIS — E78.5 HYPERLIPIDEMIA LDL GOAL <130: Primary | ICD-10-CM

## 2021-04-07 DIAGNOSIS — B00.9 HERPES SIMPLEX VIRUS (HSV) INFECTION: ICD-10-CM

## 2021-04-07 DIAGNOSIS — E66.01 MORBID OBESITY DUE TO EXCESS CALORIES (H): ICD-10-CM

## 2021-04-07 LAB
% GRANULOCYTES: 58.1 %
ALBUMIN SERPL-MCNC: 4.3 G/DL (ref 3.6–5.1)
ALBUMIN/GLOB SERPL: 1.7 {RATIO} (ref 1–2.5)
ALP SERPL-CCNC: 47 U/L (ref 33–130)
ALT 1742-6: 19 U/L (ref 0–32)
AST 1920-8: 19 U/L (ref 0–35)
BILIRUB SERPL-MCNC: 0.6 MG/DL (ref 0.2–1.2)
BUN SERPL-MCNC: 12 MG/DL (ref 7–25)
BUN/CREATININE RATIO: 16.4 (ref 6–22)
CALCIUM SERPL-MCNC: 9.9 MG/DL (ref 8.6–10.3)
CHLORIDE SERPLBLD-SCNC: 96.6 MMOL/L (ref 98–110)
CHOLEST SERPL-MCNC: 165 MG/DL (ref 0–199)
CHOLEST/HDLC SERPL: 3 {RATIO} (ref 0–5)
CO2 SERPL-SCNC: 31 MMOL/L (ref 20–32)
CREAT SERPL-MCNC: 0.73 MG/DL (ref 0.6–1.3)
GLOBULIN, CALCULATED - QUEST: 2.5 (ref 1.9–3.7)
GLUCOSE SERPL-MCNC: 93 MG/DL (ref 60–99)
HCT VFR BLD AUTO: 37.7 % (ref 35–47)
HDLC SERPL-MCNC: 64 MG/DL (ref 40–150)
HEMOGLOBIN: 12.4 G/DL (ref 11.7–15.7)
LDLC SERPL CALC-MCNC: 67 MG/DL (ref 0–130)
LYMPHOCYTES NFR BLD AUTO: 33.7 %
MCH RBC QN AUTO: 28.2 PG (ref 26–33)
MCHC RBC AUTO-ENTMCNC: 32.9 G/DL (ref 31–36)
MCV RBC AUTO: 85.6 FL (ref 78–100)
MONOCYTES NFR BLD AUTO: 8.2 %
PLATELET COUNT - QUEST: 259 10^9/L (ref 150–375)
POTASSIUM SERPL-SCNC: 4.14 MMOL/L (ref 3.5–5.3)
PROT SERPL-MCNC: 6.8 G/DL (ref 6.1–8.1)
RBC # BLD AUTO: 4.4 10*12/L (ref 3.8–5.2)
SODIUM SERPL-SCNC: 137.1 MMOL/L (ref 135–146)
TRIGL SERPL-MCNC: 172 MG/DL (ref 0–149)
WBC # BLD AUTO: 5.9 10*9/L (ref 4–11)

## 2021-04-07 PROCEDURE — 99214 OFFICE O/P EST MOD 30 MIN: CPT | Performed by: FAMILY MEDICINE

## 2021-04-07 PROCEDURE — 36415 COLL VENOUS BLD VENIPUNCTURE: CPT | Performed by: FAMILY MEDICINE

## 2021-04-07 PROCEDURE — 80053 COMPREHEN METABOLIC PANEL: CPT | Performed by: FAMILY MEDICINE

## 2021-04-07 PROCEDURE — 85025 COMPLETE CBC W/AUTO DIFF WBC: CPT | Performed by: FAMILY MEDICINE

## 2021-04-07 PROCEDURE — 80061 LIPID PANEL: CPT | Performed by: FAMILY MEDICINE

## 2021-04-07 RX ORDER — ATENOLOL 50 MG/1
50 TABLET ORAL DAILY
Qty: 90 TABLET | Refills: 1 | Status: SHIPPED | OUTPATIENT
Start: 2021-04-07 | End: 2021-11-16

## 2021-04-07 RX ORDER — HYDROCHLOROTHIAZIDE 25 MG/1
TABLET ORAL
Qty: 90 TABLET | Refills: 3 | COMMUNITY
Start: 2021-04-07

## 2021-04-07 RX ORDER — AMLODIPINE BESYLATE 10 MG/1
TABLET ORAL
Qty: 90 TABLET | Refills: 3 | COMMUNITY
Start: 2021-04-07

## 2021-04-07 RX ORDER — AMLODIPINE BESYLATE 10 MG/1
10 TABLET ORAL DAILY
Qty: 30 TABLET | Refills: 0 | Status: SHIPPED | OUTPATIENT
Start: 2021-04-07 | End: 2021-04-07

## 2021-04-07 RX ORDER — VALACYCLOVIR HYDROCHLORIDE 1 G/1
TABLET, FILM COATED ORAL
Qty: 12 TABLET | Refills: 1 | Status: CANCELLED | OUTPATIENT
Start: 2021-04-07

## 2021-04-07 RX ORDER — AMLODIPINE BESYLATE 10 MG/1
10 TABLET ORAL DAILY
Qty: 30 TABLET | Refills: 0 | Status: CANCELLED | OUTPATIENT
Start: 2021-04-07

## 2021-04-07 RX ORDER — AMLODIPINE BESYLATE 10 MG/1
10 TABLET ORAL DAILY
Qty: 90 TABLET | Refills: 3 | Status: SHIPPED | OUTPATIENT
Start: 2021-04-07 | End: 2022-03-10

## 2021-04-07 RX ORDER — ROSUVASTATIN CALCIUM 10 MG/1
10 TABLET, COATED ORAL DAILY
Qty: 90 TABLET | Refills: 3 | Status: SHIPPED | OUTPATIENT
Start: 2021-04-07 | End: 2022-03-10

## 2021-04-07 RX ORDER — HYDROCHLOROTHIAZIDE 25 MG/1
25 TABLET ORAL DAILY
Qty: 30 TABLET | Refills: 0 | Status: CANCELLED | OUTPATIENT
Start: 2021-04-07

## 2021-04-07 RX ORDER — POTASSIUM CHLORIDE 1500 MG/1
20 TABLET, EXTENDED RELEASE ORAL DAILY
Qty: 90 TABLET | Refills: 1 | Status: SHIPPED | OUTPATIENT
Start: 2021-04-07 | End: 2021-11-16

## 2021-04-07 RX ORDER — VALACYCLOVIR HYDROCHLORIDE 1 G/1
TABLET, FILM COATED ORAL
Qty: 12 TABLET | Refills: 1 | Status: SHIPPED | OUTPATIENT
Start: 2021-04-07 | End: 2021-11-16

## 2021-04-07 RX ORDER — POTASSIUM CHLORIDE 1500 MG/1
20 TABLET, EXTENDED RELEASE ORAL DAILY
Qty: 90 TABLET | Refills: 1 | Status: CANCELLED | OUTPATIENT
Start: 2021-04-07

## 2021-04-07 RX ORDER — ATENOLOL 50 MG/1
50 TABLET ORAL DAILY
Qty: 90 TABLET | Refills: 1 | Status: CANCELLED | OUTPATIENT
Start: 2021-04-07

## 2021-04-07 RX ORDER — HYDROCHLOROTHIAZIDE 25 MG/1
25 TABLET ORAL DAILY
Qty: 90 TABLET | Refills: 3 | Status: SHIPPED | OUTPATIENT
Start: 2021-04-07 | End: 2022-03-10

## 2021-04-07 RX ORDER — HYDROCHLOROTHIAZIDE 25 MG/1
25 TABLET ORAL DAILY
Qty: 30 TABLET | Refills: 0 | Status: SHIPPED | OUTPATIENT
Start: 2021-04-07 | End: 2021-04-07

## 2021-04-07 SDOH — ECONOMIC STABILITY: FOOD INSECURITY: WITHIN THE PAST 12 MONTHS, THE FOOD YOU BOUGHT JUST DIDN'T LAST AND YOU DIDN'T HAVE MONEY TO GET MORE.: NEVER TRUE

## 2021-04-07 SDOH — ECONOMIC STABILITY: TRANSPORTATION INSECURITY
IN THE PAST 12 MONTHS, HAS THE LACK OF TRANSPORTATION KEPT YOU FROM MEDICAL APPOINTMENTS OR FROM GETTING MEDICATIONS?: NO

## 2021-04-07 SDOH — ECONOMIC STABILITY: TRANSPORTATION INSECURITY
IN THE PAST 12 MONTHS, HAS LACK OF TRANSPORTATION KEPT YOU FROM MEETINGS, WORK, OR FROM GETTING THINGS NEEDED FOR DAILY LIVING?: NO

## 2021-04-07 SDOH — ECONOMIC STABILITY: INCOME INSECURITY: HOW HARD IS IT FOR YOU TO PAY FOR THE VERY BASICS LIKE FOOD, HOUSING, MEDICAL CARE, AND HEATING?: NOT HARD AT ALL

## 2021-04-07 SDOH — ECONOMIC STABILITY: FOOD INSECURITY: WITHIN THE PAST 12 MONTHS, YOU WORRIED THAT YOUR FOOD WOULD RUN OUT BEFORE YOU GOT MONEY TO BUY MORE.: NEVER TRUE

## 2021-04-07 ASSESSMENT — MIFFLIN-ST. JEOR: SCORE: 1533.77

## 2021-04-07 NOTE — NURSING NOTE
Questioned patient about current smoking habits.  Pt. has never smoked.  PULSE regular  My Chart: active  CLASSIFICATION OF OVERWEIGHT AND OBESITY BY BMI                        Obesity Class           BMI(kg/m2)  Underweight                                    < 18.5  Normal                                         18.5-24.9  Overweight                                     25.0-29.9  OBESITY                     I                  30.0-34.9                             II                 35.0-39.9  EXTREME OBESITY             III                >40                            Patient's  BMI Body mass index is 40.6 kg/m .  http://hin.nhlbi.nih.gov/menuplanner/menu.cgi  Pre-visit planning  Immunizations - up to date  Colonoscopy - is up to date  Mammogram - is up to date  Asthma -   PHQ9 -    PAT-7 -

## 2021-04-07 NOTE — PATIENT INSTRUCTIONS
Await labs    If normal recheck 1 year fasting    1)  Medication: continue current medication regimen unchanged  2)  Low fat, low cholesterol diet  3)  Regular aerobic exercise  4)  Recheck in 6 months, sooner should new symptoms or   problems arise.    Patient Education: Reviewed risks of elevated lipids and principles   of treatment.

## 2021-04-07 NOTE — PROGRESS NOTES
SUBJECTIVE:  Maria Victoria Arcos is an 70 year old female who presents for evaluation of   Hyperlipidemia, hypertension and herpes medication. Is heterozygote for Factor V Leiden mutation and previous DVT.    8 weeks post surgery/ HGB was low/ needs a recheck     She has been diagnosed in the past as having   combined hyperlipidemia and homocysteine levels. She indicates that she is feeling well   and denies any symptoms of cardiovascular disease. Specifically   denies chest pain, palpitations, dyspnea, orthopnea, PND,   claudication or peripheral edema. Treatment modalities employed to   this point include diet, regular aerobic exercise and Crestor/ recnet knee replacement last fall.  . Current medication   regimen is as listed below. Patient denies any side effects of   medication.    Family history: positive for hypertension, diabetes mellitus and cardiovascular disease  Age at diagnosis of hyperlipidemia: 53 and hypertension age 51  Cardiovascular risk factors: family history, lipids, hypertension, obesity, sedentary life style, stress and Factor V Leiden mutation/ homocystinemia    Current Outpatient Medications   Medication     albuterol (PROAIR HFA/PROVENTIL HFA/VENTOLIN HFA) 108 (90 Base) MCG/ACT inhaler     amLODIPine (NORVASC) 10 MG tablet     aspirin (ASA) 325 MG EC tablet     atenolol (TENORMIN) 50 MG tablet     budesonide-formoterol (SYMBICORT) 160-4.5 MCG/ACT Inhaler     COMPOUNDED NON-CONTROLLED SUBSTANCE (CMPD RX) - PHARMACY TO MIX COMPOUNDED MEDICATION     docusate sodium (COLACE) 50 MG capsule     Folic Acid-Vit B6-Vit B12 2.5-25-1 MG TABS     hydrochlorothiazide (HYDRODIURIL) 25 MG tablet     hydrOXYzine (ATARAX) 25 MG tablet     ibuprofen (ADVIL/MOTRIN) 200 MG tablet     ketoconazole (NIZORAL) 2 % external cream     potassium chloride ER (KLOR-CON M) 20 MEQ CR tablet     rosuvastatin (CRESTOR) 10 MG tablet     TYLENOL 325 MG OR TABS     valACYclovir (VALTREX) 1000 mg tablet     VITAMIN D PO     No  "current facility-administered medications for this visit.      Allergies   Allergen Reactions     Advicor Hives     Is a combination medication of niacin and lovastatin     Meperidine Hcl Hives     Methocarbamol Hives     Neomycin-Polymyxin-Dexameth Other (See Comments)       Social History     Tobacco Use     Smoking status: Never Smoker     Smokeless tobacco: Never Used   Substance Use Topics     Alcohol use: Yes     Comment: social; maybe 1 per week       OBJECTIVE:  /68 (BP Location: Right arm, Patient Position: Chair, Cuff Size: Adult Regular)   Pulse 64   Temp 98  F (36.7  C)   Resp 20   Ht 1.6 m (5' 3\")   Wt 104 kg (229 lb 3.2 oz)   BMI 40.60 kg/m    Repeat BP R arm seated = 138/68   with large size cuff.  Skin: negative  Fundi: deferred  Lungs: negative, Percussion normal. Good diaphragmatic excursion. Lungs clear  Heart: negative, PMI normal. No lifts, heaves, or thrills. RRR. No murmurs, clicks gallops or rub  Peripheral pulses: radial=4/4, femoral=4/4, popliteal=4/4, dorsalis pedis=4/4,  Abd; The abdomen is soft without tenderness, guarding, mass or organomegaly. Bowel sounds are normal. No CVA tenderness or inguinal adenopathy noted.  Obese  BMI : Body mass index is 40.6 kg/m .  Ext: The lower extremities are normal and reveal no sign of DVT. Calves and thighs are soft and non tender, color is normal, no swelling or redness. Dinorah's sign is negative.  Pedal pulses are normal.    HGB over 12    ASSESSMENT:(E78.5) Hyperlipidemia LDL goal <130  (primary encounter diagnosis)  Plan: Lipid Panel (BFP), VENOUS COLLECTION,         rosuvastatin (CRESTOR) 10 MG tablet        1)  Medication: continue current medication regimen unchanged  2)  Low fat, low cholesterol diet  3)  Regular aerobic exercise  4)  Recheck in 1 year, sooner should new symptoms or   problems arise.    Patient Education: Reviewed risks of elevated lipids and principles   of treatment.        (I10) Essential hypertension, " benign  Plan: Comprehensive Metobolic Panel (BFP), VENOUS         COLLECTION, atenolol (TENORMIN) 50 MG tablet,         potassium chloride ER (KLOR-CON M) 20 MEQ CR         tablet, amLODIPine (NORVASC) 10 MG tablet,         hydrochlorothiazide (HYDRODIURIL) 25 MG tablet,        DISCONTINUED: amLODIPine (NORVASC) 10 MG         tablet, DISCONTINUED: hydrochlorothiazide         (HYDRODIURIL) 25 MG tablet        1)  Medication: continue current medication regimen unchanged  2)  Dietary sodium restriction  3)  Regular aerobic exercise  4)  Recheck in 1 year, sooner should new symptoms or   problems arise.    Patient Education: Reviewed risks of hypertension and principles of   treatment.        (E72.11) Hyperhomocysteinemia (H)  Plan: Homocysteine cardiovascular ([facility]), Folic         Acid-Vit B6-Vit B12 2.5-25-1 MG TABS        Continue medications    (R60.1) Generalized edema  Plan: Comprehensive Metobolic Panel (BFP), VENOUS         COLLECTION, potassium chloride ER (KLOR-CON M)         20 MEQ CR tablet, hydrochlorothiazide         (HYDRODIURIL) 25 MG tablet, DISCONTINUED:         hydrochlorothiazide (HYDRODIURIL) 25 MG tablet        resolved    (B00.9) Herpes simplex virus (HSV) infection  Comment: avoid triggers  Plan: valACYclovir (VALTREX) 1000 mg tablet        Prn use. Potential medication side effects were discussed with the patient; let me know if any occur.      (E66.01) Morbid obesity due to excess calories (H)  Plan: Lipid Panel (BFP), Comprehensive Metobolic         Panel (BFP), VENOUS COLLECTION        A low fat diet, regular aerobic exercise like walking 30 minutes daily and weight control is the treatment recommendations at this time      (D64.9) Postoperative anemia  Plan: docusate sodium (COLACE) 50 MG capsule, VENOUS         COLLECTION, HEMOGRAM PLATELET DIFF (BFP)        resolved    (Z79.899) Encounter for long-term (current) use of medications  Plan: Lipid Panel (BFP), Comprehensive Metobolic          Panel (BFP), VENOUS COLLECTION, HEMOGRAM         PLATELET DIFF (BFP)            (Z71.89) ACP (advance care planning)  Plan: I have reviewed the patient's medical history in detail and updated the computerized patient record.        Total time spent with patient today including visit and non face to face time 30 minutes.

## 2021-04-07 NOTE — TELEPHONE ENCOUNTER
Maria Victoria Arcos is requesting a refill of:    Refused Prescriptions:                       Disp   Refills    hydrochlorothiazide (HYDRODIURIL) 25 MG ta*90 tab*3        Sig: TAKE 1 TABLET(25 MG) BY MOUTH DAILY  Refused By: ELAYNE STOCKTON  Reason for Refusal: OTHER    amLODIPine (NORVASC) 10 MG tablet [Pharmac*90 tab*3        Sig: TAKE 1 TABLET(10 MG) BY MOUTH DAILY  Refused By: ELAYNE STOCKTON  Reason for Refusal: OTHER

## 2021-04-08 ASSESSMENT — ASTHMA QUESTIONNAIRES: ACT_TOTALSCORE: 24

## 2021-04-12 ENCOUNTER — TRANSFERRED RECORDS (OUTPATIENT)
Dept: HEALTH INFORMATION MANAGEMENT | Facility: CLINIC | Age: 71
End: 2021-04-12

## 2021-05-06 DIAGNOSIS — I10 ESSENTIAL HYPERTENSION, BENIGN: ICD-10-CM

## 2021-05-06 RX ORDER — AMLODIPINE BESYLATE 10 MG/1
TABLET ORAL
Qty: 30 TABLET | COMMUNITY
Start: 2021-05-06

## 2021-05-06 NOTE — TELEPHONE ENCOUNTER
Received incoming refill request for  Pending Prescriptions:                       Disp   Refills    amLODIPine (NORVASC) 10 MG tablet [Pharma*30 tab*             Sig: TAKE 1 TABLET(10 MG) BY MOUTH DAILY    Patient last had a refill of this medication on 4/7/21 for a one year supply so no refill should be needed at this time.

## 2021-07-06 ENCOUNTER — TRANSFERRED RECORDS (OUTPATIENT)
Dept: FAMILY MEDICINE | Facility: CLINIC | Age: 71
End: 2021-07-06

## 2021-08-24 ENCOUNTER — TRANSFERRED RECORDS (OUTPATIENT)
Dept: FAMILY MEDICINE | Facility: CLINIC | Age: 71
End: 2021-08-24

## 2021-09-04 ENCOUNTER — HEALTH MAINTENANCE LETTER (OUTPATIENT)
Age: 71
End: 2021-09-04

## 2021-11-04 DIAGNOSIS — I10 ESSENTIAL HYPERTENSION, BENIGN: ICD-10-CM

## 2021-11-04 RX ORDER — ATENOLOL 50 MG/1
TABLET ORAL
Qty: 90 TABLET | Refills: 1 | COMMUNITY
Start: 2021-11-04

## 2021-11-04 NOTE — TELEPHONE ENCOUNTER
Received incoming refill request for  Pending Prescriptions:                       Disp   Refills    atenolol (TENORMIN) 50 MG tablet [Pharmac*90 tab*1            Sig: TAKE 1 TABLET(50 MG) BY MOUTH DAILY    Patient last had a refill of this medication on 4/7/21 for a 6 month supply. The patient is now due for an OV and nothing is currently scheduled so this is being denied. Sent patient a [employer] message informing her to call and schedule an OV.

## 2021-11-16 ENCOUNTER — OFFICE VISIT (OUTPATIENT)
Dept: FAMILY MEDICINE | Facility: CLINIC | Age: 71
End: 2021-11-16

## 2021-11-16 VITALS
TEMPERATURE: 97.9 F | HEART RATE: 58 BPM | WEIGHT: 242 LBS | DIASTOLIC BLOOD PRESSURE: 72 MMHG | OXYGEN SATURATION: 98 % | HEIGHT: 63 IN | SYSTOLIC BLOOD PRESSURE: 138 MMHG | BODY MASS INDEX: 42.88 KG/M2

## 2021-11-16 DIAGNOSIS — B00.9 HERPES SIMPLEX VIRUS (HSV) INFECTION: ICD-10-CM

## 2021-11-16 DIAGNOSIS — M17.11 ARTHRITIS OF KNEE, RIGHT: ICD-10-CM

## 2021-11-16 DIAGNOSIS — E87.6 CHRONICALLY LOW SERUM POTASSIUM: ICD-10-CM

## 2021-11-16 DIAGNOSIS — R60.1 GENERALIZED EDEMA: ICD-10-CM

## 2021-11-16 DIAGNOSIS — I10 ESSENTIAL HYPERTENSION, BENIGN: Primary | ICD-10-CM

## 2021-11-16 DIAGNOSIS — E66.01 MORBID OBESITY WITH BMI OF 40.0-44.9, ADULT (H): ICD-10-CM

## 2021-11-16 PROCEDURE — 99213 OFFICE O/P EST LOW 20 MIN: CPT | Performed by: PHYSICIAN ASSISTANT

## 2021-11-16 RX ORDER — ATENOLOL 50 MG/1
50 TABLET ORAL DAILY
Qty: 90 TABLET | Refills: 1 | Status: SHIPPED | OUTPATIENT
Start: 2021-11-16 | End: 2022-03-10

## 2021-11-16 RX ORDER — HYDROXYZINE HYDROCHLORIDE 25 MG/1
25 TABLET, FILM COATED ORAL EVERY 6 HOURS PRN
Qty: 30 TABLET | Refills: 1 | Status: SHIPPED | OUTPATIENT
Start: 2021-11-16 | End: 2021-11-18

## 2021-11-16 RX ORDER — VALACYCLOVIR HYDROCHLORIDE 1 G/1
TABLET, FILM COATED ORAL
Qty: 12 TABLET | Refills: 1 | Status: SHIPPED | OUTPATIENT
Start: 2021-11-16

## 2021-11-16 RX ORDER — POTASSIUM CHLORIDE 1500 MG/1
20 TABLET, EXTENDED RELEASE ORAL DAILY
Qty: 90 TABLET | Refills: 1 | Status: SHIPPED | OUTPATIENT
Start: 2021-11-16 | End: 2022-03-10

## 2021-11-16 ASSESSMENT — MIFFLIN-ST. JEOR: SCORE: 1586.83

## 2021-11-16 NOTE — NURSING NOTE
Maria Victoria is here for a medication recheck.     Pre-Visit Screening :  Immunizations : up to date    Colonoscopy : is up to date  Mammogram : is up to date  Asthma Action Test/Plan : NA  PHQ9/GAD7 :  NA    Pulse - regular  My Chart - accepts    CLASSIFICATION OF OVERWEIGHT AND OBESITY BY BMI                         Obesity Class           BMI(kg/m2)  Underweight                                    < 18.5  Normal                                         18.5-24.9  Overweight                                     25.0-29.9  OBESITY                     I                  30.0-34.9                              II                 35.0-39.9  EXTREME OBESITY             III                >40                             Patient's  BMI Body mass index is 43.56 kg/(m^2).  http://hin.nhlbi.nih.gov/menuplanner/menu.cgi  Questioned patient about current smoking habits.  Pt. has never smoked.    SOLANGE Mathur ([facility])     Pt referred to [facility] for oncology exercise services.

## 2021-11-16 NOTE — PROGRESS NOTES
"CC: Medication Check    History:  HTN/leg edema:  Takes atenolol, amlodipine and hydrochlorothiazide. Has been taking consistently. Denies any side effects. Does check BP at home. Getting 128/high 70s-80s. Last eye exam was. Denies any chest pain, palpitations, SOB. Has been less active. Weight has been increasing by 13 lbs since 4/2021 appt. Admits she has been nervous eating with stress with \"entire family\".    Chronic Hypokalemia:  Takes potassium supplement daily. Potassium was checked 9/23/2021 and was normal.     HSV:  Takes valtrex as needed. Seldom.     Asthma:  Breathing has been worse recently with cough, fall allergies. Has been having worsening cough for over 1 year since when she had pneumonia. Has been exercising relatively regularly.     Insomnia:  Takes hydroxyzine as needed for sleep/relaxation. Won't need it for a while, then will take 2-3 nights in a row.     PMH, MEDICATIONS, ALLERGIES, SOCIAL AND FAMILY HISTORY in [facility] and reviewed by me personally.    ROS negative other than the symptoms noted above in the HPI.      Examination   /72 (BP Location: Right arm, Patient Position: Sitting, Cuff Size: Adult Large)   Pulse 58   Temp 97.9  F (36.6  C) (Temporal)   Ht 1.6 m (5' 3\")   Wt 109.8 kg (242 lb)   SpO2 98%   BMI 42.87 kg/m       Constitutional: Sitting comfortably, in no acute distress. Vital signs noted  Neck:  no adenopathy, trachea midline and normal to palpation, thyroid normal to palpation  Cardiovascular:  regular rate and rhythm, no murmurs, clicks, or gallops  Respiratory:  normal respiratory rate and rhythm, lungs clear to auscultation  SKIN: No jaundice/pallor/rash.   Psychiatric: mentation appears normal and affect normal/bright    A/P    ICD-10-CM    1. Essential hypertension, benign  I10    2. Arthritis of knee, right  M17.11    3. Generalized edema  R60.1    4. Herpes simplex virus (HSV) infection  B00.9    5. Morbid obesity with BMI of 40.0-44.9, adult (H)  E66.01     " Z68.41    6. Chronically low serum potassium  E87.6        DISCUSSION:  HTN/leg edema:  BP is nearly controlled, suspect due to situational stressors, weight. Labs were recently checked 9/23/2021 and were normal. Will refill medication without change for 6 months. Encouraged her to recommit to weight loss. Plans to establish with cancer support group.     Chronic Hypokalemia:  Already checked potassium.     HSV:  Would refill as needed for 1 year.     Asthma:  Continue working closely with pulmonologist.     Insomnia/relaxation:  Will refill hydroxyzine as needed.     follow up visit: 6 months    Renee Donnelly PA-C  [facility]

## 2021-11-16 NOTE — NURSING NOTE
Chief Complaint   Patient presents with     Recheck Medication     fasting         Pre-visit Screening:  Immunizations:  up to date  Colonoscopy:  is up to date  Mammogram: is up to date  Asthma Action Test/Plan:  Yes  PHQ9:  NA  GAD7:  NA  Questioned patient about current smoking habits Pt. has never smoked.  Ok to leave detailed message on voice mail for today's visit only Yes, phone # [phone]

## 2021-11-17 DIAGNOSIS — E72.11 HYPERHOMOCYSTEINEMIA (H): ICD-10-CM

## 2021-11-17 RX ORDER — CYANOCOBALAMIN/FOLIC AC/VIT B6 1-2.5-25MG
TABLET ORAL
Qty: 90 TABLET | Refills: 3 | COMMUNITY
Start: 2021-11-17

## 2021-11-17 ASSESSMENT — ASTHMA QUESTIONNAIRES: ACT_TOTALSCORE: 19

## 2021-11-17 NOTE — TELEPHONE ENCOUNTER
Maria Victoria Arcos is requesting a refill of:    Refused Prescriptions:                       Disp   Refills    FOLBEE 2.5-25-1 MG TABS [Pharmacy Med Name*90 tab*3        Sig: TAKE 1 TABLET BY MOUTH DAILY  Refused By: DANELLE PERALTA  Reason for Refusal: Request already responded to by other means (phone, fax, etc.)  Reason for Refusal Comment: Refilled on 04/07/21 for 90 with 3 refills

## 2021-11-18 ENCOUNTER — MYC MEDICAL ADVICE (OUTPATIENT)
Dept: FAMILY MEDICINE | Facility: CLINIC | Age: 71
End: 2021-11-18

## 2021-11-18 DIAGNOSIS — M17.11 ARTHRITIS OF KNEE, RIGHT: ICD-10-CM

## 2021-11-18 DIAGNOSIS — G47.00 INSOMNIA, UNSPECIFIED TYPE: Primary | ICD-10-CM

## 2021-11-18 RX ORDER — HYDROXYZINE PAMOATE 25 MG/1
25 CAPSULE ORAL
Qty: 30 CAPSULE | Refills: 0 | Status: SHIPPED | OUTPATIENT
Start: 2021-11-18 | End: 2022-03-10

## 2021-11-18 NOTE — TELEPHONE ENCOUNTER
Looks like this was given to her after having surgery?    Please advise    Maria Victoria Arcos is requesting a refill of:    Pending Prescriptions:                       Disp   Refills    hydrOXYzine (VISTARIL) 25 MG capsule                          Sig: Take 1 capsule (25 mg) by mouth 3 times daily as           needed for itching

## 2021-12-16 ENCOUNTER — TRANSFERRED RECORDS (OUTPATIENT)
Dept: FAMILY MEDICINE | Facility: CLINIC | Age: 71
End: 2021-12-16

## 2021-12-23 ENCOUNTER — HOSPITAL ENCOUNTER (OUTPATIENT)
Dept: CT IMAGING | Facility: CLINIC | Age: 71
Discharge: HOME | End: 2021-12-23
Attending: OBSTETRICS & GYNECOLOGY | Admitting: OBSTETRICS & GYNECOLOGY
Payer: MEDICARE

## 2021-12-23 DIAGNOSIS — N39.0 URINARY TRACT INFECTION: ICD-10-CM

## 2021-12-23 LAB
CREAT BLD-MCNC: 0.8 MG/DL (ref 0.5–1)
GFR SERPL CREATININE-BSD FRML MDRD: >60 ML/MIN/1.73M2

## 2021-12-23 PROCEDURE — 250N000009 HC RX 250: Performed by: OBSTETRICS & GYNECOLOGY

## 2021-12-23 PROCEDURE — 74178 CT ABD&PLV WO CNTR FLWD CNTR: CPT

## 2021-12-23 PROCEDURE — 250N000011 HC RX IP 250 OP 636: Performed by: OBSTETRICS & GYNECOLOGY

## 2021-12-23 PROCEDURE — 82565 ASSAY OF CREATININE: CPT

## 2021-12-23 RX ORDER — IOPAMIDOL 755 MG/ML
500 INJECTION, SOLUTION INTRAVASCULAR ONCE
Status: COMPLETED | OUTPATIENT
Start: 2021-12-23 | End: 2021-12-23

## 2021-12-23 RX ADMIN — SODIUM CHLORIDE 95 ML: 9 INJECTION, SOLUTION INTRAVENOUS at 10:54

## 2021-12-23 RX ADMIN — IOPAMIDOL 100 ML: 755 INJECTION, SOLUTION INTRAVENOUS at 10:52

## 2022-02-11 ENCOUNTER — THERAPY VISIT (OUTPATIENT)
Dept: PHYSICAL THERAPY | Facility: CLINIC | Age: 72
End: 2022-02-11
Payer: MEDICARE

## 2022-02-11 DIAGNOSIS — M25.512 CHRONIC PAIN OF BOTH SHOULDERS: ICD-10-CM

## 2022-02-11 DIAGNOSIS — G89.29 CHRONIC PAIN OF BOTH SHOULDERS: ICD-10-CM

## 2022-02-11 DIAGNOSIS — M25.511 CHRONIC PAIN OF BOTH SHOULDERS: ICD-10-CM

## 2022-02-11 PROCEDURE — 97110 THERAPEUTIC EXERCISES: CPT | Mod: GP | Performed by: PHYSICAL THERAPIST

## 2022-02-11 PROCEDURE — 97161 PT EVAL LOW COMPLEX 20 MIN: CPT | Mod: GP | Performed by: PHYSICAL THERAPIST

## 2022-02-11 NOTE — PROGRESS NOTES
[facility]    OUTPATIENT Physical Therapy ORTHOPEDIC EVALUATION  PLAN OF TREATMENT FOR OUTPATIENT REHABILITATION  (COMPLETE FOR INITIAL CLAIMS ONLY)  Patient's Last Name, First Name, M.I.  YOB: 1950  Maria Victoria Arcos    Provider s Name:  [facility]   Medical Record No.  [id number]   Start of Care Date:  02/11/22   Onset Date:   02/03/22   Type:     _X__PT   ___OT Medical Diagnosis:    Encounter Diagnosis   Name Primary?     Chronic pain of both shoulders         Treatment Diagnosis:  B shoulder pain        Goals:     02/11/22 0500   Body Part   Goals listed below are for B shoulder pain   Goal #1   Goal #1 reaching   Previous Functional Level No restrictions   Current Functional Level Can reach ;overhead   Performance level pain 4/10   STG Target Performance Reach ;overhead   Performance level pain 2/10   Rationale for independent personal hygiene;for dressing;for bathing   Due date 03/04/22   LTG Target Performance Reach;overhead   Performance Level pain 4/10   Rationale for independent personal hygiene;for dressing;for bathing   Due date 03/25/22       Therapy Frequency:  1 x week  Predicted Duration of Therapy Intervention:  6 weeks    Brian Vernon, PT                 I CERTIFY THE NEED FOR THESE SERVICES FURNISHED UNDER        THIS PLAN OF TREATMENT AND WHILE UNDER MY CARE .             Physician Signature               Date    X_____________________________________________________                             Certification Date From:  02/11/22   Certification Date To:  03/25/22    Referring Provider:  Himanshu Pedro    Initial Assessment        See Epic Evaluation SOC Date: 02/11/22

## 2022-02-11 NOTE — PROGRESS NOTES
Physical Therapy Initial Evaluation  Subjective:  The history is provided by the patient. No  was used.   Patient Health History  Maria Victoria Arcos being seen for B shoulder pain dut to OA.     Date of Onset: years ago.   Problem occurred: OA   Pain is reported as 4/10 on pain scale.  General health as reported by patient is good.  Pertinent medical history includes: asthma, high blood pressure and osteoarthritis.   Red flags:  None as reported by patient.  Medical allergies: See EPIC.   Surgeries include:  Orthopedic surgery. Other surgery history details: Knees and hands.    Current medications:  Anti-inflammatory and pain medication.    Current occupation is retired.                     Therapist Generated HPI Evaluation  Problem details: Pt. complains of bilateral shoulder pain that has been present for years.  No known trauma.  PT order dated 2/3/22.      .         Type of problem:  Bilateral shoulders.    This is a chronic condition.  Condition occurred with:  Unknown cause.  Where condition occurred: for unknown reasons.  Patient reports pain:  In the joint.  Pain is described as aching and is intermittent.  Pain radiates to:  Shoulder. Pain is worse during the night.  Since onset symptoms are unchanged.  Associated symptoms:  Loss of strength. Symptoms are exacerbated by using arm at shoulder level, using arm behind back, using arm overhead and lying on extremity  and relieved by rest.  Special tests included:  X-ray (Bilateral OA).  Past treatment: injections. There was significant improvement following previous treatment.  Barriers include:  None as reported by patient.                        Objective:  Standing Alignment:      Shoulder/UE:  Rounded shoulders                  Flexibility/Screens:   Positive screens:  Shoulder                             Shoulder Evaluation:  ROM:  AROM:  normal                            PROM:  normal                                Strength:    Flexion:  Left:4/5   Pain:    Right: 4/5     Pain:     Abduction:  Left: 4/5  Pain:    Right: 4/5     Pain:    Internal Rotation:  Left:4/5     Pain:    Right: 4/5     Pain:  External Rotation:   Left:3/5     Pain:   Right:3/5     Pain:            Stability Testing:  normal      Special Tests:  normal      Palpation:  normal      Mobility Tests:  normal                                                 General     ROS    Assessment/Plan:    Patient is a 71 year old female with both sides shoulder complaints.    Patient has the following significant findings with corresponding treatment plan.                Diagnosis 1:  B shoulder pain  Pain -  self management, education, directional preference exercise and home program  Decreased strength - therapeutic exercise and therapeutic activities  Decreased proprioception - neuro re-education and therapeutic activities  Impaired muscle performance - neuro re-education  Decreased function - therapeutic activities    Therapy Evaluation Codes:   1) Clinical presentation characteristics are:   Stable/Uncomplicated.  2) Decision-Making    Low complexity using standardized patient assessment instrument and/or measureable assessment of functional outcome.  Cumulative Therapy Evaluation is: Low complexity.    Previous and current functional limitations:  (See Goal Flow Sheet for this information)    Short term and Long term goals: (See Goal Flow Sheet for this information)     Communication ability:  Patient appears to be able to clearly communicate and understand verbal and written communication and follow directions correctly.  Treatment Explanation - The following has been discussed with the patient:   RX ordered/plan of care  Anticipated outcomes  Possible risks and side effects  This patient would benefit from PT intervention to resume normal activities.   Rehab potential is good.    Frequency:  1 X week, once daily  Duration:  for 6 weeks  Discharge Plan:  Achieve all LTG.  Independent  in home treatment program.  Reach maximal therapeutic benefit.    Please refer to the daily flowsheet for treatment today, total treatment time and time spent performing 1:1 timed codes.

## 2022-02-19 ENCOUNTER — HEALTH MAINTENANCE LETTER (OUTPATIENT)
Age: 72
End: 2022-02-19

## 2022-02-25 ENCOUNTER — THERAPY VISIT (OUTPATIENT)
Dept: PHYSICAL THERAPY | Facility: CLINIC | Age: 72
End: 2022-02-25
Payer: MEDICARE

## 2022-02-25 DIAGNOSIS — M25.511 CHRONIC PAIN OF BOTH SHOULDERS: ICD-10-CM

## 2022-02-25 DIAGNOSIS — G89.29 CHRONIC PAIN OF BOTH SHOULDERS: ICD-10-CM

## 2022-02-25 DIAGNOSIS — M25.512 CHRONIC PAIN OF BOTH SHOULDERS: ICD-10-CM

## 2022-02-25 PROCEDURE — 97110 THERAPEUTIC EXERCISES: CPT | Mod: GP | Performed by: PHYSICAL THERAPIST

## 2022-02-25 PROCEDURE — 97530 THERAPEUTIC ACTIVITIES: CPT | Mod: GP | Performed by: PHYSICAL THERAPIST

## 2022-03-03 ENCOUNTER — TELEPHONE (OUTPATIENT)
Dept: FAMILY MEDICINE | Facility: CLINIC | Age: 72
End: 2022-03-03

## 2022-03-03 NOTE — TELEPHONE ENCOUNTER
Maria Victoria Arcos called the clinic support line with the following:    Is wanting to come in and get her blood work done before her appointment. Has not been feeling well and feels that she is dehydrated and been getting arm and leg cramps.

## 2022-03-04 ENCOUNTER — OFFICE VISIT (OUTPATIENT)
Dept: FAMILY MEDICINE | Facility: CLINIC | Age: 72
End: 2022-03-04

## 2022-03-04 VITALS
BODY MASS INDEX: 44.3 KG/M2 | WEIGHT: 250 LBS | HEART RATE: 56 BPM | SYSTOLIC BLOOD PRESSURE: 130 MMHG | TEMPERATURE: 97.3 F | DIASTOLIC BLOOD PRESSURE: 70 MMHG | OXYGEN SATURATION: 99 % | HEIGHT: 63 IN

## 2022-03-04 DIAGNOSIS — E78.5 HYPERLIPIDEMIA LDL GOAL <100: ICD-10-CM

## 2022-03-04 DIAGNOSIS — R25.2 MUSCLE CRAMPING: Primary | ICD-10-CM

## 2022-03-04 LAB
ALBUMIN SERPL-MCNC: 4.4 G/DL (ref 3.6–5.1)
ALBUMIN/GLOB SERPL: 1.4 {RATIO} (ref 1–2.5)
ALP SERPL-CCNC: 35 U/L (ref 33–130)
ALT 1742-6: 18 U/L (ref 0–32)
AST 1920-8: 16 U/L (ref 0–35)
BILIRUB SERPL-MCNC: 0.6 MG/DL (ref 0.2–1.2)
BUN SERPL-MCNC: 17 MG/DL (ref 7–25)
BUN/CREATININE RATIO: 18.9 (ref 6–22)
CALCIUM SERPL-MCNC: 9.9 MG/DL (ref 8.6–10.3)
CHLORIDE SERPLBLD-SCNC: 94.5 MMOL/L (ref 98–110)
CHOLEST SERPL-MCNC: 176 MG/DL (ref 0–199)
CHOLEST/HDLC SERPL: 2 {RATIO} (ref 0–5)
CO2 SERPL-SCNC: 29.9 MMOL/L (ref 20–32)
CREAT SERPL-MCNC: 0.9 MG/DL (ref 0.6–1.3)
ERYTHROCYTE [DISTWIDTH] IN BLOOD BY AUTOMATED COUNT: 13 %
ERYTHROCYTE [SEDIMENTATION RATE] IN BLOOD: 12 MM/HR (ref 0–20)
GLOBULIN, CALCULATED - QUEST: 2.4 (ref 1.9–3.7)
GLUCOSE SERPL-MCNC: 92 MG/DL (ref 60–99)
HCT VFR BLD AUTO: 37.9 % (ref 35–47)
HDLC SERPL-MCNC: 89 MG/DL (ref 40–150)
HEMOGLOBIN: 12.7 G/DL (ref 11.7–15.7)
LDLC SERPL CALC-MCNC: 66 MG/DL (ref 0–130)
MCH RBC QN AUTO: 30 PG (ref 26–33)
MCHC RBC AUTO-ENTMCNC: 33.5 G/DL (ref 31–36)
MCV RBC AUTO: 89.6 FL (ref 78–100)
PLATELET COUNT - QUEST: 248 10^9/L (ref 150–375)
POTASSIUM SERPL-SCNC: 4.33 MMOL/L (ref 3.5–5.3)
PROT SERPL-MCNC: 6.8 G/DL (ref 6.1–8.1)
RBC # BLD AUTO: 4.23 10*12/L (ref 3.8–5.2)
SODIUM SERPL-SCNC: 131.9 MMOL/L (ref 135–146)
TRIGL SERPL-MCNC: 106 MG/DL (ref 0–149)
WBC # BLD AUTO: 6.8 10*9/L (ref 4–11)

## 2022-03-04 PROCEDURE — 85651 RBC SED RATE NONAUTOMATED: CPT | Performed by: PHYSICIAN ASSISTANT

## 2022-03-04 PROCEDURE — 80053 COMPREHEN METABOLIC PANEL: CPT | Performed by: PHYSICIAN ASSISTANT

## 2022-03-04 PROCEDURE — 99213 OFFICE O/P EST LOW 20 MIN: CPT | Performed by: PHYSICIAN ASSISTANT

## 2022-03-04 PROCEDURE — 36415 COLL VENOUS BLD VENIPUNCTURE: CPT | Performed by: PHYSICIAN ASSISTANT

## 2022-03-04 RX ORDER — ESTRADIOL 0.1 MG/G
CREAM VAGINAL
COMMUNITY
Start: 2022-01-31 | End: 2022-03-10

## 2022-03-04 RX ORDER — GABAPENTIN 100 MG/1
100-300 CAPSULE ORAL 3 TIMES DAILY PRN
Qty: 30 CAPSULE | Refills: 1 | Status: SHIPPED | OUTPATIENT
Start: 2022-03-04 | End: 2022-04-05

## 2022-03-04 NOTE — PROGRESS NOTES
"CC: Cramping, tired    History:  Has been having leg cramping that goes back forth to both legs for 10-15 years. Has been tolerable, and only lasts for 1 minute, happens not even once per month. Does have chronic low back pain and has done physical therapy in the past. Was not recommended surgery.     Now in the past month, Maria Victoria feels like symptoms has significantly worsening. Still almost exclusively happening at night, but occasionally in afternoon. Wakes up like she was punched in the leg. Starts in outside of knee, wraps around back of calf into top of foot/great toe. Left is much worse, but right can happen as well.     Has tried been more active, and less active, and doesn't matter. Feels like she is well hydrated. Did try magnesium for 2 weeks. Started trying hydroxyzine that she had from an old script, with minimal effect.      Otherwise feeling well.     PMH, MEDICATIONS, ALLERGIES, SOCIAL AND FAMILY HISTORY in [facility] and reviewed by me personally.    ROS negative other than the symptoms noted above in the HPI.    Examination   /70 (BP Location: Right arm, Patient Position: Sitting, Cuff Size: Adult Large)   Pulse 56   Temp 97.3  F (36.3  C) (Temporal)   Ht 1.6 m (5' 3\")   Wt 113.4 kg (250 lb)   SpO2 99%   BMI 44.29 kg/m       Constitutional: Sitting comfortably, in no acute distress. Vital signs noted  Neck:  no adenopathy, trachea midline and normal to palpation, thyroid normal to palpation  Cardiovascular:  regular rate and rhythm, no murmurs, clicks, or gallops  Respiratory:  normal respiratory rate and rhythm, lungs clear to auscultation  M/S: ROM of back intact. Mild tenderness to palpation throughout gastrocnemius on left lower extremity . No palpable cord, edema, erythema.   NEURO:  muscle strength decreased with flexion and extension of hip on left side, sensation to light touch and pinprick normal, reflexes normal and symmetric  SKIN: No jaundice/pallor/rash.   Psychiatric: mentation " appears normal and affect normal/bright      A/P    ICD-10-CM    1. Muscle cramping  R25.2 VENOUS COLLECTION     ESR WESTERGREN ([facility])     gabapentin (NEURONTIN) 100 MG capsule     C-Reactive Protein CRP ([facility])     Magnesium ([facility])     Comprehensive Metobolic Panel ([facility])     TSH WITH FREE T4 REFLEX ([facility])     Hemogram Platelet ([facility])   2. Hyperlipidemia LDL goal <100  E78.5 VENOUS COLLECTION     Lipid Panel ([facility])       DISCUSSION:  Based on acute on chronic symptoms, recommended lab work-up today. Will check ESR/CRP, CMP, Mag, TSH, CBC, and contact with results. I am somewhat suspicious this is lumbar radiculopathy from her back. Recommended trial of gabapentin 1-3 capsules up to 3 times daily as needed. Warned her of side effects, drowsiness, and to use caution. She is returning to clinic next week. Based on lab results, trial of gabapentin, may consider spine referral. No issues with bowel/bladder control, saddle paresthesia, but will proceed to ER with any significant worsening.     follow up visit: 1 week    [facility]

## 2022-03-04 NOTE — NURSING NOTE
"Chief Complaint   Patient presents with     Leg Pain     bilateral leg pain. Pt has multiple \"keisha horses\" overnight. The left leg travels down her whole leg. Right leg pain radiates down and stops at the back of the knee. Has been experiencing keisha horses for many years but much more pronounced recently.         Pre-visit Screening:  Immunizations:  up to date  Colonoscopy:  is up to date  Mammogram: is up to date  Asthma Action Test/Plan:  Yes  PHQ9:  NA  GAD7:  NA  Questioned patient about current smoking habits Pt. has never smoked.  Ok to leave detailed message on voice mail for today's visit only Yes, phone # [phone]        " Caller would like to discuss an/a Lab results  for patient . Writer advised caller of callback within 24-72 hours.    Patient Name: Kori E Patterson  Caller Name: Hailey   Name of Facility: [facility]    Callback Number:  #[phone]  Best Availability: anytime 8am 5:00 pm    Can A Detailed Message Be left? yes  Fax Number: [phone]    Additional Info: Caller requesting a call back to discuss patient 2019 Lipid results.   Did you confirm the message with the caller?: yes    Thank you,  Diane Bhatti

## 2022-03-07 LAB
CRP SERPL-MCNC: 0.8 MG/L (ref 0–0.8)
MAGNESIUM SERPL-MCNC: 1.9 MG/DL (ref 1.5–2.5)
TSH SERPL-ACNC: 2.64 MIU/L (ref 0.4–4.5)

## 2022-03-10 ENCOUNTER — OFFICE VISIT (OUTPATIENT)
Dept: FAMILY MEDICINE | Facility: CLINIC | Age: 72
End: 2022-03-10

## 2022-03-10 VITALS
WEIGHT: 250 LBS | BODY MASS INDEX: 44.3 KG/M2 | OXYGEN SATURATION: 97 % | HEART RATE: 64 BPM | HEIGHT: 63 IN | SYSTOLIC BLOOD PRESSURE: 130 MMHG | TEMPERATURE: 97.6 F | DIASTOLIC BLOOD PRESSURE: 74 MMHG

## 2022-03-10 DIAGNOSIS — I10 ESSENTIAL HYPERTENSION, BENIGN: ICD-10-CM

## 2022-03-10 DIAGNOSIS — Z86.718 PERSONAL HISTORY OF DVT (DEEP VEIN THROMBOSIS): ICD-10-CM

## 2022-03-10 DIAGNOSIS — E66.01 MORBID OBESITY WITH BMI OF 40.0-44.9, ADULT (H): ICD-10-CM

## 2022-03-10 DIAGNOSIS — E87.1 HYPONATREMIA: Primary | ICD-10-CM

## 2022-03-10 DIAGNOSIS — E78.5 HYPERLIPIDEMIA LDL GOAL <130: ICD-10-CM

## 2022-03-10 DIAGNOSIS — D68.51 FACTOR V LEIDEN MUTATION (H): ICD-10-CM

## 2022-03-10 DIAGNOSIS — R60.1 GENERALIZED EDEMA: ICD-10-CM

## 2022-03-10 DIAGNOSIS — E72.11 HYPERHOMOCYSTEINEMIA (H): ICD-10-CM

## 2022-03-10 DIAGNOSIS — G47.00 INSOMNIA, UNSPECIFIED TYPE: ICD-10-CM

## 2022-03-10 PROCEDURE — 99213 OFFICE O/P EST LOW 20 MIN: CPT | Performed by: PHYSICIAN ASSISTANT

## 2022-03-10 RX ORDER — POTASSIUM CHLORIDE 1500 MG/1
20 TABLET, EXTENDED RELEASE ORAL DAILY
Qty: 90 TABLET | Refills: 3 | Status: SHIPPED | OUTPATIENT
Start: 2022-03-10 | End: 2023-03-21

## 2022-03-10 RX ORDER — ATENOLOL 50 MG/1
50 TABLET ORAL DAILY
Qty: 90 TABLET | Refills: 3 | Status: SHIPPED | OUTPATIENT
Start: 2022-03-10 | End: 2023-03-21

## 2022-03-10 RX ORDER — HYDROCHLOROTHIAZIDE 25 MG/1
25 TABLET ORAL DAILY
Qty: 90 TABLET | Refills: 3 | Status: SHIPPED | OUTPATIENT
Start: 2022-03-10 | End: 2023-03-21

## 2022-03-10 RX ORDER — HYDROXYZINE PAMOATE 25 MG/1
25 CAPSULE ORAL
Qty: 30 CAPSULE | Refills: 1 | Status: SHIPPED | OUTPATIENT
Start: 2022-03-10 | End: 2022-11-03

## 2022-03-10 RX ORDER — AMLODIPINE BESYLATE 10 MG/1
10 TABLET ORAL DAILY
Qty: 90 TABLET | Refills: 3 | Status: SHIPPED | OUTPATIENT
Start: 2022-03-10 | End: 2023-03-21

## 2022-03-10 RX ORDER — ROSUVASTATIN CALCIUM 10 MG/1
10 TABLET, COATED ORAL DAILY
Qty: 90 TABLET | Refills: 3 | Status: SHIPPED | OUTPATIENT
Start: 2022-03-10 | End: 2023-03-21

## 2022-03-10 NOTE — LETTER
[facility]  [address], [address]  [place]  [address]    March 10, 2022        Maria Victoria Arcos  [address] [address]              To Whom It May Concern:    Maria Victoria is a patient at our clinic. Due to her living with person who has cancer, recommended she complete her 4th dose of Covid-19 immunization.     If you have any further questions or problems, please contact our office at [phone].          Renee Donnelly PA-C

## 2022-03-10 NOTE — PROGRESS NOTES
"CC: Recheck, Medication Check    History:  Cramping, fatigue:  Maria Victoria was last here last week with cramping in legs, fatigue. Did lab work up that was reassuring other than low sodium. Also did trial of gabapentin. Since that time has cut down on her somewhat excessive water intake, and incorporated somewhat more salt in diet. Has been taking increments of 1-2 capsules at night to help with nighttime cramping, which sometimes has helped and sometimes hasn't. Wondering if she can take 3 capsules. Pt thinks this is obesity, arthritis related. Plans to work on diet, return to pool exercises.     HTN:  Maria Victoria was last here for medication check 11/2021. Takes atenolol, amlodipine and hydrochlorothiazide. History of hypokalemia, but this has been controlled. At previous appt weight had increased by 13 lbs (from 242 lbs), which she agreed to work on. Admitted she had been stress eating.     Insomnia/relaxation:  Has taken hydroxyzine on occasion at bedtime prior to gabapentin. Has stopped using since starting gabapentin.     Factor V Leiden:  Takes baby Aspirin daily. Due to history of DVT in arm after surgery.      PMH, MEDICATIONS, ALLERGIES, SOCIAL AND FAMILY HISTORY in [facility] and reviewed by me personally.    ROS negative other than the symptoms noted above in the HPI.    Examination   /74   Pulse 64   Temp 97.6  F (36.4  C) (Temporal)   Ht 1.6 m (5' 3\")   Wt 113.4 kg (250 lb)   SpO2 97%   BMI 44.29 kg/m       Constitutional: Sitting comfortably, in no acute distress. Vital signs noted  Neck:  no adenopathy, trachea midline and normal to palpation, thyroid normal to palpation  Cardiovascular:  regular rate and rhythm, no murmurs, clicks, or gallops  Respiratory:  normal respiratory rate and rhythm, lungs clear to auscultation  SKIN: No jaundice/pallor/rash.   Psychiatric: mentation appears normal and affect normal/bright      A/P    ICD-10-CM    1. Hyponatremia  E87.1 VENOUS COLLECTION     Basic Metabolic " Panel ([facility])   2. Essential hypertension, benign  I10    3. Hyperhomocysteinemia (H)  E72.11    4. Generalized edema  R60.1    5. Hyperlipidemia LDL goal <130  E78.5    6. Morbid obesity with BMI of 40.0-44.9, adult (H)  E66.01     Z68.41    7. Factor V Leiden mutation (H)  D68.51    8. Personal history of DVT (deep vein thrombosis)  Z86.718        DISCUSSION:  Cramping, fatigue:  Agreed to continue working with gabapentin, as it starting to look more consistent with lumbar radiculopathy. Would consider spine referral, but pt would like to wait. Working on lifestyle.     HTN:  BP controlled today. Doing well. Labs already updated last appt. No concerns other than low sodium, but agrees to return for lab only in mid-April for recheck    Mixed Hyperlipidemia:  Cholesterol controlled when checked last week. Refill for 1 year.    Insomnia/relaxation:  Will refill hydroxyzine as needed.     Factor V Leiden:  Stable. No concerns.     follow up visit: 1 year, next month for repeat sodium check.     Renee Donnelly PA-C  [facility]

## 2022-03-10 NOTE — NURSING NOTE
Chief Complaint   Patient presents with     Recheck Medication     already had fasting labs     Leg Pain     recheck leg cramping, left is worse, gabapentin nerve block medication helped for 3 nights but still having pain and discuss dosing increase       Pre-visit Screening:  Immunizations:  up to date  Colonoscopy:  is up to date  Mammogram: is up to date  Asthma Action Test/Plan:  NA  PHQ9:  NA  GAD7:  NA  Questioned patient about current smoking habits Pt. has never smoked.  Ok to leave detailed message on voice mail for today's visit only Yes, phone # [phone]

## 2022-03-15 ENCOUNTER — MYC MEDICAL ADVICE (OUTPATIENT)
Dept: FAMILY MEDICINE | Facility: CLINIC | Age: 72
End: 2022-03-15

## 2022-03-15 DIAGNOSIS — G89.29 CHRONIC BILATERAL LOW BACK PAIN, UNSPECIFIED WHETHER SCIATICA PRESENT: ICD-10-CM

## 2022-03-15 DIAGNOSIS — M54.50 CHRONIC BILATERAL LOW BACK PAIN, UNSPECIFIED WHETHER SCIATICA PRESENT: ICD-10-CM

## 2022-03-15 DIAGNOSIS — R25.2 MUSCLE CRAMPING: Primary | ICD-10-CM

## 2022-03-28 NOTE — PROGRESS NOTES
Discharge Note    Progress reporting period is from initial eval to Feb 25, 2022.     Maria Victoria failed to return for next follow up visit and current status is unknown.  Please see information below for last relevant information on current status.  Patient seen for Rxs Used: 2 visits.  SUBJECTIVE  Subjective changes noted by patient:  Subjective: Better.  less pain and improved strength.  Difficulty performing EXT and Hor abd ex's  .  Current pain level is Current Pain level: 3/10.     Previous pain level was  Initial Pain level: 4/10.   Changes in function:  Yes (See Goal flowsheet attached for changes in current functional level)  Adverse reaction to treatment or activity: None    OBJECTIVE  Changes noted in objective findings: Objective: AROM WNL.  R shoulder abd 4/5 and painful     ASSESSMENT/PLAN  Diagnosis: B shoulder pain   DIAGP:  The encounter diagnosis was Chronic pain of both shoulders.  Updated problem list and treatment plan:   Decreased strength - HEP  STG/LTGs have been met or progress has been made towards goals:  Yes, please see goal flowsheet for most current information  Assessment of Progress: current status is unknown.  Last current status: Assessment of progress: Pt is progressing well, Pt is progressing as expected   Self Management Plans:  HEP  I have re-evaluated this patient and find that the nature, scope, duration and intensity of the therapy is appropriate for the medical condition of the patient.  Maria Victoria continues to require the following intervention to meet STG and LTG's:  HEP.    Recommendations:  Discharge with current home program.  Patient to follow up with MD as needed.    Please refer to the daily flowsheet for treatment today, total treatment time and time spent performing 1:1 timed codes.

## 2022-04-05 DIAGNOSIS — R25.2 MUSCLE CRAMPING: ICD-10-CM

## 2022-04-05 RX ORDER — GABAPENTIN 100 MG/1
100-300 CAPSULE ORAL 3 TIMES DAILY PRN
Qty: 30 CAPSULE | Refills: 1 | Status: SHIPPED | OUTPATIENT
Start: 2022-04-05 | End: 2022-07-05

## 2022-04-05 NOTE — TELEPHONE ENCOUNTER
Can you review for [facility]?    Maria Victoria Arcos is requesting a refill of:    Pending Prescriptions:                       Disp   Refills    gabapentin (NEURONTIN) 100 MG capsule     30 cap*1            Sig: Take 1-3 capsules (100-300 mg) by mouth 3 times           daily as needed for neuropathic pain    Please close encounter if RX was sent. Thanks, Mariah

## 2022-04-15 DIAGNOSIS — E87.1 HYPONATREMIA: ICD-10-CM

## 2022-04-15 LAB
BUN SERPL-MCNC: 16 MG/DL (ref 7–25)
BUN/CREATININE RATIO: 16.8 (ref 6–22)
CALCIUM SERPL-MCNC: 9.8 MG/DL (ref 8.6–10.3)
CHLORIDE SERPLBLD-SCNC: 95.1 MMOL/L (ref 98–110)
CO2 SERPL-SCNC: 29.3 MMOL/L (ref 20–32)
CREAT SERPL-MCNC: 0.95 MG/DL (ref 0.6–1.3)
GLUCOSE SERPL-MCNC: 99 MG/DL (ref 60–99)
POTASSIUM SERPL-SCNC: 3.9 MMOL/L (ref 3.5–5.3)
SODIUM SERPL-SCNC: 134.2 MMOL/L (ref 135–146)

## 2022-04-15 PROCEDURE — 36415 COLL VENOUS BLD VENIPUNCTURE: CPT | Performed by: PHYSICIAN ASSISTANT

## 2022-04-15 PROCEDURE — 80048 BASIC METABOLIC PNL TOTAL CA: CPT | Performed by: PHYSICIAN ASSISTANT

## 2022-05-09 DIAGNOSIS — E66.01 MORBID OBESITY DUE TO EXCESS CALORIES (H): ICD-10-CM

## 2022-05-09 DIAGNOSIS — B35.4 TINEA CORPORIS: ICD-10-CM

## 2022-05-09 NOTE — TELEPHONE ENCOUNTER
Received refill request for pt's ketoconazole from [facility]. Last refilled by [facility]. Please advise.    Maria Victoria Arcos is requesting a refill of:    Pending Prescriptions:                       Disp   Refills    ketoconazole (NIZORAL) 2 % external cream 30 g   1

## 2022-05-10 RX ORDER — KETOCONAZOLE 20 MG/G
CREAM TOPICAL DAILY PRN
Qty: 30 G | Refills: 1 | Status: SHIPPED | OUTPATIENT
Start: 2022-05-10 | End: 2022-08-18

## 2022-05-12 ENCOUNTER — OFFICE VISIT (OUTPATIENT)
Dept: FAMILY MEDICINE | Facility: CLINIC | Age: 72
End: 2022-05-12

## 2022-05-12 VITALS
SYSTOLIC BLOOD PRESSURE: 128 MMHG | OXYGEN SATURATION: 98 % | DIASTOLIC BLOOD PRESSURE: 82 MMHG | TEMPERATURE: 98.2 F | HEART RATE: 60 BPM

## 2022-05-12 DIAGNOSIS — L03.211 CELLULITIS OF FACE: Primary | ICD-10-CM

## 2022-05-12 PROCEDURE — 99213 OFFICE O/P EST LOW 20 MIN: CPT | Performed by: PHYSICIAN ASSISTANT

## 2022-05-12 RX ORDER — CEPHALEXIN 500 MG/1
500 CAPSULE ORAL 4 TIMES DAILY
Qty: 20 CAPSULE | Refills: 0 | Status: SHIPPED | OUTPATIENT
Start: 2022-05-12 | End: 2022-05-17

## 2022-05-12 NOTE — NURSING NOTE
Chief Complaint   Patient presents with     Lump     Boil on left side of nose, noticed on Sunday, becomes quite swollen or painful       Pre-visit Screening:  Immunizations:  up to date  Colonoscopy:  is up to date  Mammogram: is up to date  Asthma Action Test/Plan:  NA  PHQ9:  NA  GAD7:  NA  Questioned patient about current smoking habits Pt. has never smoked.  Ok to leave detailed message on voice mail for today's visit only Yes, phone # [phone]

## 2022-05-12 NOTE — PROGRESS NOTES
"  Assessment & Plan     Cellulitis of face-will treat given high risk area for infection, no visible boil to chandrakant so will use antibiotic therapy  Use warm compress on area 3-4x a day  - cephALEXin (KEFLEX) 500 MG capsule  Dispense: 20 capsule; Refill: 0  Warned that if symptoms do not improve the causative bacteria could be MRSA, if necessary can switch for MRSA coverage  Warned to watch for confusion and other concerning signs/symptoms such as fevers/chills-seek emergency care if this occurs  Call [facility] to reschedule injection      Follow up as needed, patient will contact clinic if not better in 5 days    No follow-ups on file.    Jamil Blanco, SABA  [facility]    Subjective   Maria Victoria is a 71 year old who presents for the following health issues     HPI     Patient started to get a \"boil\" with swelling on the outside and inside her nose this previous Sunday. She has previously gotten boils on her legs but never on her face. Associates some pain with the boil-it also waxes and wanes in size over the last few days. The area is red and tight. Area is on her L nare and inside L nostril. Patient denies any fever, chills, but is having some mild headaches with this issue. She is supposed to get a spinal injection today and is wondering if she should postpone this procedure due to her symptoms.      Review of Systems   Constitutional, HEENT, cardiovascular, pulmonary, gi and gu systems are negative, except as otherwise noted.      Objective    /82 (BP Location: Left arm, Patient Position: Sitting, Cuff Size: Adult Large)   Pulse 60   Temp 98.2  F (36.8  C) (Temporal)   SpO2 98%   There is no height or weight on file to calculate BMI.  Physical Exam   GENERAL: healthy, alert and no distress  EYES: Eyes grossly normal to inspection, PERRL and conjunctivae and sclerae normal  HENT: ear canals and TM's normal, nose and mouth without ulcers or lesions  NECK: no adenopathy, no asymmetry, masses, or " scars and thyroid normal to palpation  RESP: lungs clear to auscultation - no rales, rhonchi or wheezes  CV: regular rate and rhythm, normal S1 S2, no S3 or S4, no murmur, click or rub, no peripheral edema and peripheral pulses strong  ABDOMEN: soft, nontender, no hepatosplenomegaly, no masses and bowel sounds normal  MS: no gross musculoskeletal defects noted, no edema  SKIN: L nare: mild erythema and pain to palpation of area, mild swelling noted on L side of face

## 2022-06-07 ENCOUNTER — MYC MEDICAL ADVICE (OUTPATIENT)
Dept: FAMILY MEDICINE | Facility: CLINIC | Age: 72
End: 2022-06-07

## 2022-06-07 DIAGNOSIS — L03.211 CELLULITIS OF FACE: Primary | ICD-10-CM

## 2022-06-07 RX ORDER — MUPIROCIN 20 MG/G
OINTMENT TOPICAL 3 TIMES DAILY
Qty: 22 G | Refills: 0 | Status: SHIPPED | OUTPATIENT
Start: 2022-06-07 | End: 2022-06-21

## 2022-06-14 ENCOUNTER — THERAPY VISIT (OUTPATIENT)
Dept: PHYSICAL THERAPY | Facility: CLINIC | Age: 72
End: 2022-06-14
Payer: MEDICARE

## 2022-06-14 DIAGNOSIS — G89.29 CHRONIC BILATERAL LOW BACK PAIN WITH LEFT-SIDED SCIATICA: ICD-10-CM

## 2022-06-14 DIAGNOSIS — M54.42 CHRONIC BILATERAL LOW BACK PAIN WITH LEFT-SIDED SCIATICA: ICD-10-CM

## 2022-06-14 PROCEDURE — 97110 THERAPEUTIC EXERCISES: CPT | Mod: GP | Performed by: PHYSICAL THERAPIST

## 2022-06-14 PROCEDURE — 97161 PT EVAL LOW COMPLEX 20 MIN: CPT | Mod: GP | Performed by: PHYSICAL THERAPIST

## 2022-06-14 NOTE — PROGRESS NOTES
Physical Therapy Initial Evaluation  Subjective:  The history is provided by the patient. No  was used.   Patient Health History  Maria Victoria Arcos being seen for LBP.     Date of Onset: 6 months ago.   Problem occurred: unknown   Pain is reported as 4/10 on pain scale.    Pertinent medical history includes: cancer, high blood pressure, numbness/tingling, osteoarthritis, overweight and sleep disorder/apnea.   Red flags:  None as reported by patient.  Medical allergies: ee EPIC.   Surgeries include:  Orthopedic surgery and cancer surgery.    Current medications:  High blood pressure medication, muscle relaxants, steroids and pain medication.    Current occupation is retired.                     Therapist Generated HPI Evaluation  Problem details: Pt. complains of LBP that has been present for 6 monhts.  No known trauma.  PT order dated 6/7/22.      .         Type of problem:  Lumbar.    This is a recurrent condition.  Condition occurred with:  Insidious onset.  Where condition occurred: for unknown reasons.  Patient reports pain:  Central lumbar spine and mid lumbar spine.  Pain is described as aching and is intermittent.  Pain radiates to:  Gluteals left. Pain is worse during the day.  Since onset symptoms are gradually worsening.  Associated symptoms:  Loss of motion/stiffness, loss of strength and loss of motion. Symptoms are exacerbated by standing and walking  and relieved by rest.  Special tests included:  MRI and x-ray.  Past treatment: injection with moderate releif.   Barriers include:  None as reported by patient.                        Objective:  Standing Alignment:        Lumbar:  Lordosis incr                Flexibility/Screens:   Positive screens:  Lumbar                   Lumbar/SI Evaluation  ROM:    AROM Lumbar:   Flexion:          100% with End range tightness  Ext:                    <25% with ERP   Side Bend:        Left:  50% with ERP    Right:  75% with ERP  Rotation:            Left:     Right:   Side Glide:        Left:     Right:           Lumbar Myotomes:  normal                Lumbar Dermtomes:  normal                Neural Tension/Mobility:  Lumbar:  Normal        Lumbar Palpation:  normal          Spinal Segmental Conclusions:     Level: Hypo noted at S1, L5, L4, L3, L2 and L1                                                   General     ROS    Assessment/Plan:    Patient is a 71 year old female with lumbar complaints.    Patient has the following significant findings with corresponding treatment plan.                Diagnosis 1:  LBP  Pain -  self management, education, directional preference exercise and home program  Decreased ROM/flexibility - manual therapy and therapeutic exercise  Decreased strength - therapeutic exercise and therapeutic activities  Impaired muscle performance - neuro re-education  Decreased function - therapeutic activities    Therapy Evaluation Codes:       1) Clinical presentation characteristics are:   Stable/Uncomplicated.  2) Decision-Making    Low complexity using standardized patient assessment instrument and/or measureable assessment of functional outcome.  Cumulative Therapy Evaluation is: Low complexity.    Previous and current functional limitations:  (See Goal Flow Sheet for this information)    Short term and Long term goals: (See Goal Flow Sheet for this information)     Communication ability:  Patient appears to be able to clearly communicate and understand verbal and written communication and follow directions correctly.  Treatment Explanation - The following has been discussed with the patient:   RX ordered/plan of care  Anticipated outcomes  Possible risks and side effects  This patient would benefit from PT intervention to resume normal activities.   Rehab potential is good.    Frequency:  1 X week, once daily  Duration:  for 8 weeks  Discharge Plan:  Achieve all LTG.  Independent in home treatment program.  Reach maximal therapeutic  benefit.    Please refer to the daily flowsheet for treatment today, total treatment time and time spent performing 1:1 timed codes.

## 2022-06-14 NOTE — PROGRESS NOTES
[facility]    OUTPATIENT Physical Therapy ORTHOPEDIC EVALUATION  PLAN OF TREATMENT FOR OUTPATIENT REHABILITATION  (COMPLETE FOR INITIAL CLAIMS ONLY)  Patient's Last Name, First Name, M.I.  YOB: 1950  Maria Victoria Arcos    Provider s Name:  [facility]   Medical Record No.  [id number]   Start of Care Date:  06/14/22   Onset Date:   06/07/22   Type:     _X__PT   ___OT Medical Diagnosis:    Encounter Diagnosis   Name Primary?    Chronic bilateral low back pain with left-sided sciatica         Treatment Diagnosis:  LBP        Goals:     06/14/22 0500   Body Part   Goals listed below are for LBP   Goal #1   Goal #1 ambulation   Previous Functional Level No restrictions   Current Functional Level Minutes patient can walk   Performance Level 10 with pain 4/10   STG Target Performance Minutes patient will be able to walk   Performance Level 20 with pain 2/10   Rationale for safe household ambulation;for safe outdoor household ambulation;for safe community ambulation   Due Date 07/05/22    LTG Target Performance Minutes patient will be able to  walk   Performance Level 30 with pain 1/10   Rationale for safe household ambulation;for safe outdoor household ambulation;for safe community ambulation   Due Date 08/09/22       Therapy Frequency:  1 x week  Predicted Duration of Therapy Intervention:  8 weeks    Brian Vernon PT                 I CERTIFY THE NEED FOR THESE SERVICES FURNISHED UNDER        THIS PLAN OF TREATMENT AND WHILE UNDER MY CARE .             Physician Signature               Date    X_____________________________________________________            Danial Garcia MD             Certification Date From:  06/14/22   Certification Date To:  08/09/22    Referring Provider:  No ref. provider found    Initial Assessment        See Epic Evaluation SOC Date:  06/14/22

## 2022-06-21 ENCOUNTER — THERAPY VISIT (OUTPATIENT)
Dept: PHYSICAL THERAPY | Facility: CLINIC | Age: 72
End: 2022-06-21
Payer: MEDICARE

## 2022-06-21 DIAGNOSIS — M54.42 CHRONIC BILATERAL LOW BACK PAIN WITH LEFT-SIDED SCIATICA: Primary | ICD-10-CM

## 2022-06-21 DIAGNOSIS — G89.29 CHRONIC BILATERAL LOW BACK PAIN WITH LEFT-SIDED SCIATICA: Primary | ICD-10-CM

## 2022-06-21 PROCEDURE — 97110 THERAPEUTIC EXERCISES: CPT | Mod: GP | Performed by: PHYSICAL THERAPIST

## 2022-06-30 ENCOUNTER — MYC MEDICAL ADVICE (OUTPATIENT)
Dept: FAMILY MEDICINE | Facility: CLINIC | Age: 72
End: 2022-06-30

## 2022-06-30 NOTE — TELEPHONE ENCOUNTER
Called patient and spoke with her over the phone. She scheduled an office visit to talk with Renee more about this since she has been taking Losartan for the past year but there is no documentation for this.

## 2022-07-01 ENCOUNTER — THERAPY VISIT (OUTPATIENT)
Dept: PHYSICAL THERAPY | Facility: CLINIC | Age: 72
End: 2022-07-01
Payer: MEDICARE

## 2022-07-01 DIAGNOSIS — M54.42 CHRONIC BILATERAL LOW BACK PAIN WITH LEFT-SIDED SCIATICA: Primary | ICD-10-CM

## 2022-07-01 DIAGNOSIS — G89.29 CHRONIC BILATERAL LOW BACK PAIN WITH LEFT-SIDED SCIATICA: Primary | ICD-10-CM

## 2022-07-01 PROCEDURE — 97110 THERAPEUTIC EXERCISES: CPT | Mod: GP | Performed by: PHYSICAL THERAPIST

## 2022-07-05 ENCOUNTER — OFFICE VISIT (OUTPATIENT)
Dept: FAMILY MEDICINE | Facility: CLINIC | Age: 72
End: 2022-07-05

## 2022-07-05 VITALS
BODY MASS INDEX: 43.41 KG/M2 | HEART RATE: 63 BPM | WEIGHT: 245 LBS | SYSTOLIC BLOOD PRESSURE: 148 MMHG | HEIGHT: 63 IN | OXYGEN SATURATION: 99 % | DIASTOLIC BLOOD PRESSURE: 80 MMHG | TEMPERATURE: 97.9 F

## 2022-07-05 DIAGNOSIS — J45.40 MODERATE PERSISTENT ASTHMA WITHOUT COMPLICATION: ICD-10-CM

## 2022-07-05 DIAGNOSIS — E87.1 CHRONIC HYPONATREMIA: ICD-10-CM

## 2022-07-05 DIAGNOSIS — G47.33 OSA (OBSTRUCTIVE SLEEP APNEA): ICD-10-CM

## 2022-07-05 DIAGNOSIS — E66.01 MORBID OBESITY WITH BMI OF 40.0-44.9, ADULT (H): ICD-10-CM

## 2022-07-05 DIAGNOSIS — I10 ESSENTIAL HYPERTENSION, BENIGN: Primary | ICD-10-CM

## 2022-07-05 DIAGNOSIS — E04.1 THYROID NODULE: ICD-10-CM

## 2022-07-05 DIAGNOSIS — E66.01 MORBID OBESITY DUE TO EXCESS CALORIES (H): ICD-10-CM

## 2022-07-05 PROBLEM — E87.6: Status: RESOLVED | Noted: 2020-04-02 | Resolved: 2022-07-05

## 2022-07-05 PROCEDURE — 99213 OFFICE O/P EST LOW 20 MIN: CPT | Performed by: PHYSICIAN ASSISTANT

## 2022-07-05 RX ORDER — CYANOCOBALAMIN/FOLIC AC/VIT B6 1-2.5-25MG
TABLET ORAL
COMMUNITY
End: 2023-09-03

## 2022-07-05 RX ORDER — LOSARTAN POTASSIUM 100 MG/1
100 TABLET ORAL DAILY
COMMUNITY
Start: 2022-06-16 | End: 2022-07-05

## 2022-07-05 RX ORDER — AMOXICILLIN 500 MG/1
500 CAPSULE ORAL DAILY PRN
COMMUNITY

## 2022-07-05 RX ORDER — BUDESONIDE AND FORMOTEROL FUMARATE DIHYDRATE 160; 4.5 UG/1; UG/1
2 AEROSOL RESPIRATORY (INHALATION) 2 TIMES DAILY
Qty: 30.6 G | Refills: 3 | Status: SHIPPED | OUTPATIENT
Start: 2022-07-05

## 2022-07-05 RX ORDER — ACYCLOVIR 50 MG/G
CREAM TOPICAL
COMMUNITY

## 2022-07-05 RX ORDER — LOSARTAN POTASSIUM 100 MG/1
100 TABLET ORAL DAILY
Qty: 90 TABLET | Refills: 2 | Status: SHIPPED | OUTPATIENT
Start: 2022-07-05 | End: 2023-03-21

## 2022-07-05 RX ORDER — CHLORAL HYDRATE 500 MG
2 CAPSULE ORAL DAILY
COMMUNITY

## 2022-07-05 NOTE — NURSING NOTE
Chief Complaint   Patient presents with     Recheck Medication     Hypertension         Pre-visit Screening:  Immunizations:  up to date  Colonoscopy:  is up to date  Mammogram: is up to date  Asthma Action Test/Plan:  NA  PHQ9:  NA  GAD7:  NA  Questioned patient about current smoking habits Pt. has never smoked.  Ok to leave detailed message on voice mail for today's visit only Yes, phone # [phone]

## 2022-07-05 NOTE — PROGRESS NOTES
"CC: Medication Check    History:  HTN, obesity:  Pt is treated for HTN, last discussed at 3/2022 appt. Taking amlodipine 10 mg, atenolol 50 mg, and hydrochlorothiazide 25 mg daily. At that appt hyponatremia was more notable, so asked her to recheck in April, which had returned to her stable hyponatremia. She recently contacted us for a refill of losartan 100 mg, which she has been taking for 1 year, but I don't see any record in our system of this having been prescribed. I suspect this prescription has been coming from [facility], where she has had executive physicals, and endocrinology evaluations. Has been taking consistently. Denies any side effects. Does check BP at home. Got 128/78 before giving blood 2 weeks ago. Last eye exam was 1/2022. Denies any chest pain, palpitations, SOB. Has been staying active, and plans to rejoin [facility] in the fall. Weight has been stable.     Cramping, Spine update:  At 3/2022 med appt, had been working with gabapentin with inadequate control of symptoms. Agreed to refer to spine specialist. Saw Dr. Garcia with [facility] and ordered an MRI. Has been recommended minimally invasive surgery to repair degenerative disc, stenosis. Has been trying PT, which is working somewhat. Scheduled a second opinion at [facility] this fall.     Asthma:  Used to see Dr. Grimes, who has since left [facility]. Had been refilling her Symbicort for asthma.     Thyroid nodule:  Managed by [facility]. Completed FNA 10/2021. Was told to repeat US in 3 year.     PMH, MEDICATIONS, ALLERGIES, SOCIAL AND FAMILY HISTORY in EPIC and reviewed by me personally.    ROS negative other than the symptoms noted above in the HPI.     Examination   BP (!) 148/80 (BP Location: Right arm, Patient Position: Sitting, Cuff Size: Adult Large)   Pulse 63   Temp 97.9  F (36.6  C) (Temporal)   Ht 1.6 m (5' 3\")   Wt 111.1 kg (245 lb)   SpO2 99%   BMI 43.40 kg/m       Constitutional: Sitting comfortably, in no acute distress. Vital " signs noted. BP elevated.   Neck:  no adenopathy, trachea midline and normal to palpation, thyroid normal to palpation  Cardiovascular:  regular rate and rhythm, no murmurs, clicks, or gallops  Respiratory:  normal respiratory rate and rhythm, lungs clear to auscultation  SKIN: No jaundice/pallor/rash.   Psychiatric: mentation appears normal and affect normal/bright    A/P    ICD-10-CM    1. Essential hypertension, benign  I10 losartan (COZAAR) 100 MG tablet   2. Thyroid nodule- managed by [facility]- repeat US 2024  E04.1    3. Morbid obesity due to excess calories (H)  E66.01    4. Moderate persistent asthma without complication  J45.40 budesonide-formoterol (SYMBICORT) 160-4.5 MCG/ACT Inhaler   5. ISABELL (obstructive sleep apnea)  G47.33    6. Morbid obesity with BMI of 40.0-44.9, adult (H)  E66.01     Z68.41    7. Chronic hyponatremia  E87.1        DISCUSSION:  HTN, obesity:  BP elevated today, and pt refuses weight to be taken today. BP not controlled, but per pt has been more controlled at home, other medical appts, blood draws. She agrees to restart home BP checks and contact us in 2-3 weeks with updated BPs. Goal would be <130/90, which she did get at recent blood draw. Refilled losartan to sync with other BP medications. Would be due for fasting med check next April.     Cramping, Spine update:  Continue working with spine specialist, PT, as well as [facility] 2nd opinion.     Asthma/ISABELL:  Agreed to refill Symbicort to use consistently to prevent flare. She does plan on continuing care through [facility] as she has ISABELL as well.     Thyroid nodule:  Continue working working with [facility] for nodule surveillance.     follow up visit: As needed    Renee Donnelly PA-C  [facility]

## 2022-07-14 ENCOUNTER — THERAPY VISIT (OUTPATIENT)
Dept: PHYSICAL THERAPY | Facility: CLINIC | Age: 72
End: 2022-07-14
Payer: MEDICARE

## 2022-07-14 DIAGNOSIS — M54.42 CHRONIC BILATERAL LOW BACK PAIN WITH LEFT-SIDED SCIATICA: Primary | ICD-10-CM

## 2022-07-14 DIAGNOSIS — G89.29 CHRONIC BILATERAL LOW BACK PAIN WITH LEFT-SIDED SCIATICA: Primary | ICD-10-CM

## 2022-07-14 PROCEDURE — 97110 THERAPEUTIC EXERCISES: CPT | Mod: GP | Performed by: PHYSICAL THERAPIST

## 2022-08-09 ENCOUNTER — THERAPY VISIT (OUTPATIENT)
Dept: PHYSICAL THERAPY | Facility: CLINIC | Age: 72
End: 2022-08-09
Payer: MEDICARE

## 2022-08-09 DIAGNOSIS — M54.42 CHRONIC BILATERAL LOW BACK PAIN WITH LEFT-SIDED SCIATICA: Primary | ICD-10-CM

## 2022-08-09 DIAGNOSIS — G89.29 CHRONIC BILATERAL LOW BACK PAIN WITH LEFT-SIDED SCIATICA: Primary | ICD-10-CM

## 2022-08-09 PROCEDURE — 97110 THERAPEUTIC EXERCISES: CPT | Mod: GP | Performed by: PHYSICAL THERAPIST

## 2022-08-09 NOTE — PROGRESS NOTES
PROGRESS  REPORT    Progress reporting period is from eval to 8/9/22.       SUBJECTIVE  Subjective changes noted by patient:  .  Subjective: Pt report feeling very good past few days.    Current pain level is  Current Pain level: 2/10.     Previous pain level was   Initial Pain level: 4/10.   Changes in function:  Yes (See Goal flowsheet attached for changes in current functional level)  Adverse reaction to treatment or activity: None    OBJECTIVE  Changes noted in objective findings:  Yes,   Objective: LROM flexion 100%,  Ext 75% with PDM     ASSESSMENT/PLAN  Updated problem list and treatment plan: Diagnosis 1:  LBP  Pain -  self management, education, directional preference exercise and home program  Decreased ROM/flexibility - manual therapy and therapeutic exercise  Decreased function - therapeutic activities  STG/LTGs have been met or progress has been made towards goals:  Yes (See Goal flow sheet completed today.)  Assessment of Progress: The patient's condition is improving.  Self Management Plans:  Patient has been instructed in a home treatment program.  Patient is independent in a home treatment program.  I have re-evaluated this patient and find that the nature, scope, duration and intensity of the therapy is appropriate for the medical condition of the patient.      Recommendations:  This patient would benefit from further evaluation.    Please refer to the daily flowsheet for treatment today, total treatment time and time spent performing 1:1 timed codes.

## 2022-08-18 DIAGNOSIS — B35.4 TINEA CORPORIS: ICD-10-CM

## 2022-08-18 RX ORDER — KETOCONAZOLE 20 MG/G
CREAM TOPICAL DAILY PRN
Qty: 30 G | Refills: 1 | Status: SHIPPED | OUTPATIENT
Start: 2022-08-18

## 2022-08-18 NOTE — TELEPHONE ENCOUNTER
Received a refill for ketoconazole. Last filled by Renee on 5/10/2022.  Routing to Jamil (on call), thanks.      Pending Prescriptions:                       Disp   Refills    ketoconazole (NIZORAL) 2 % external cream 30 g   1            Sig: Apply topically daily as needed for irritation

## 2022-10-16 ENCOUNTER — HEALTH MAINTENANCE LETTER (OUTPATIENT)
Age: 72
End: 2022-10-16

## 2022-11-03 DIAGNOSIS — G47.00 INSOMNIA, UNSPECIFIED TYPE: ICD-10-CM

## 2022-11-03 RX ORDER — HYDROXYZINE PAMOATE 25 MG/1
25 CAPSULE ORAL
Qty: 30 CAPSULE | Refills: 1 | Status: SHIPPED | OUTPATIENT
Start: 2022-11-03 | End: 2023-09-07

## 2022-11-03 NOTE — TELEPHONE ENCOUNTER
Pt is requesting a refill for hydroxyzine. Last filled on 3/10/2022.  Routing to Renee for review, thanks.      Pending Prescriptions:                       Disp   Refills    hydrOXYzine (VISTARIL) 25 MG capsule      30 cap*1            Sig: Take 1 capsule (25 mg) by mouth nightly as needed           for other (sleep)

## 2022-11-21 ENCOUNTER — TELEPHONE (OUTPATIENT)
Dept: FAMILY MEDICINE | Facility: CLINIC | Age: 72
End: 2022-11-21

## 2022-11-21 NOTE — TELEPHONE ENCOUNTER
Maria Victoria Arcos called the clinic support line with the following:    Thinks she has RSV. Has been using her inhalers, breathing has been fine.  Advised Urgency Room or UC if breathing gets worse.  Can call us with any questions.  Pt is ok with plan in place.

## 2022-12-01 ENCOUNTER — OFFICE VISIT (OUTPATIENT)
Dept: FAMILY MEDICINE | Facility: CLINIC | Age: 72
End: 2022-12-01

## 2022-12-01 VITALS
WEIGHT: 245 LBS | DIASTOLIC BLOOD PRESSURE: 74 MMHG | HEIGHT: 63 IN | HEART RATE: 82 BPM | BODY MASS INDEX: 43.41 KG/M2 | SYSTOLIC BLOOD PRESSURE: 140 MMHG | TEMPERATURE: 97.9 F | OXYGEN SATURATION: 98 %

## 2022-12-01 DIAGNOSIS — J45.901 MODERATE ASTHMA WITH ACUTE EXACERBATION, UNSPECIFIED WHETHER PERSISTENT: Primary | ICD-10-CM

## 2022-12-01 DIAGNOSIS — J10.1 INFLUENZA A: ICD-10-CM

## 2022-12-01 PROCEDURE — 99213 OFFICE O/P EST LOW 20 MIN: CPT | Performed by: PHYSICIAN ASSISTANT

## 2022-12-01 RX ORDER — IPRATROPIUM BROMIDE AND ALBUTEROL SULFATE 2.5; .5 MG/3ML; MG/3ML
3 SOLUTION RESPIRATORY (INHALATION)
COMMUNITY
Start: 2022-11-27 | End: 2022-12-09

## 2022-12-01 RX ORDER — BENZONATATE 200 MG/1
CAPSULE ORAL
COMMUNITY
Start: 2022-11-26 | End: 2023-03-21

## 2022-12-01 RX ORDER — PREDNISONE 10 MG/1
10 TABLET ORAL DAILY
COMMUNITY
End: 2024-01-08

## 2022-12-01 NOTE — NURSING NOTE
Chief Complaint   Patient presents with     ER F/U     Urgent Care follow up- [facility] on 11/27/22 for Influenza A and Asthma

## 2022-12-01 NOTE — PROGRESS NOTES
"CC: Recheck Cough    History:  Maria Victoria was in urgency room with severe cough 11/27/2022. Had had 10 days of symptoms at that time. Had started 10 day prednisone tapering course 11/23/2022. Diagnosed with influenza A. Did have flu shot in early October. Has been using nebulizer with Duoneb 3 times daily. Does feel like she is seeing gradual improvement. Has been using benzonatate capsules, but don't seem overly effective. Starting to cough up more of the phlegm. Has slept better that past 2 months.     Uses Symbicort regularly for asthma.    PMH, MEDICATIONS, ALLERGIES, SOCIAL AND FAMILY HISTORY in [facility] and reviewed by me personally.    ROS negative other than the symptoms noted above in the HPI.      Examination   BP (!) 140/74 (BP Location: Left arm, Patient Position: Sitting, Cuff Size: Adult Large)   Pulse 82   Temp 97.9  F (36.6  C) (Temporal)   Ht 1.6 m (5' 3\")   Wt 111.1 kg (245 lb)   SpO2 98%   BMI 43.40 kg/m         Constitutional: Sitting comfortably, in no acute distress. Vital signs noted  Ears: external canals and TMs free of abnormalities  Nose: patent, without mucosal abnormalities  Mouth and throat: without erythema or lesions of the mucosa  Neck:  no adenopathy, trachea midline and normal to palpation, thyroid normal to palpation  Cardiovascular:  regular rate and rhythm, no murmurs, clicks, or gallops  Respiratory:  normal respiratory rate and rhythm, lungs clear to auscultation  SKIN: No jaundice/pallor/rash.   Psychiatric: mentation appears normal and affect normal/bright        A/P    ICD-10-CM    1. Moderate asthma with acute exacerbation, unspecified whether persistent  J45.901 Asthma Control Test - HIM Scan      2. Influenza A  J10.1           DISCUSSION:  Reassured today by normal exam/vital signs, improving symptoms. Explained slow process of recovery from respiratory illness, especially with asthma background. Okay to continue nebulize/inhaler as needed, Symbicort twice daily. BP " mildly elevated, but suspect related to ongoing cough. Due for medication check this spring, so if still elevated may need to consider medication adjustment. Asked her to contact me in 1-2 weeks if not seeing further improvement and full resolution of her symptoms.     follow up visit: 3 months for med check, sooner As needed    Time of visit: 20 minutes    Renee Donnelly PA-C  [facility]

## 2022-12-09 DIAGNOSIS — J45.901 MODERATE ASTHMA WITH ACUTE EXACERBATION, UNSPECIFIED WHETHER PERSISTENT: Primary | ICD-10-CM

## 2022-12-09 RX ORDER — IPRATROPIUM BROMIDE AND ALBUTEROL SULFATE 2.5; .5 MG/3ML; MG/3ML
3 SOLUTION RESPIRATORY (INHALATION) EVERY 4 HOURS PRN
Qty: 90 ML | Refills: 1 | Status: SHIPPED | OUTPATIENT
Start: 2022-12-09 | End: 2024-01-08

## 2022-12-27 ENCOUNTER — TRANSFERRED RECORDS (OUTPATIENT)
Dept: FAMILY MEDICINE | Facility: CLINIC | Age: 72
End: 2022-12-27

## 2022-12-30 DIAGNOSIS — N95.2 VAGINAL ATROPHY: ICD-10-CM

## 2023-03-21 ENCOUNTER — OFFICE VISIT (OUTPATIENT)
Dept: FAMILY MEDICINE | Facility: CLINIC | Age: 73
End: 2023-03-21

## 2023-03-21 VITALS
HEIGHT: 63 IN | OXYGEN SATURATION: 99 % | DIASTOLIC BLOOD PRESSURE: 78 MMHG | HEART RATE: 58 BPM | WEIGHT: 247 LBS | SYSTOLIC BLOOD PRESSURE: 134 MMHG | BODY MASS INDEX: 43.77 KG/M2 | TEMPERATURE: 97.4 F

## 2023-03-21 DIAGNOSIS — E78.5 HYPERLIPIDEMIA LDL GOAL <130: ICD-10-CM

## 2023-03-21 DIAGNOSIS — R60.1 GENERALIZED EDEMA: ICD-10-CM

## 2023-03-21 DIAGNOSIS — I10 ESSENTIAL HYPERTENSION, BENIGN: ICD-10-CM

## 2023-03-21 LAB
ALBUMIN SERPL-MCNC: 4.5 G/DL (ref 3.6–5.1)
ALBUMIN/GLOB SERPL: 2 {RATIO} (ref 1–2.5)
ALP SERPL-CCNC: 34 U/L (ref 33–130)
ALT 1742-6: 20 U/L (ref 0–32)
AST 1920-8: 20 U/L (ref 0–35)
BILIRUB SERPL-MCNC: 0.6 MG/DL (ref 0.2–1.2)
BUN SERPL-MCNC: 20 MG/DL (ref 7–25)
BUN/CREATININE RATIO: 23.5 (ref 0–22)
CALCIUM SERPL-MCNC: 9.8 MG/DL (ref 8.6–10.3)
CHLORIDE SERPLBLD-SCNC: 95.5 MMOL/L (ref 98–110)
CHOLEST SERPL-MCNC: 150 MG/DL (ref 0–199)
CHOLEST/HDLC SERPL: 3 {RATIO} (ref 0–5)
CO2 SERPL-SCNC: 30.7 MMOL/L (ref 20–32)
CREAT SERPL-MCNC: 0.85 MG/DL (ref 0.6–1.3)
GLOBULIN, CALCULATED - QUEST: 2.3 (ref 1.9–3.7)
GLUCOSE SERPL-MCNC: 106 MG/DL (ref 60–99)
HDLC SERPL-MCNC: 60 MG/DL (ref 40–150)
LDLC SERPL CALC-MCNC: 58 MG/DL (ref 0–130)
POTASSIUM SERPL-SCNC: 3.77 MMOL/L (ref 3.5–5.3)
PROT SERPL-MCNC: 6.8 G/DL (ref 6.1–8.1)
SODIUM SERPL-SCNC: 132.9 MMOL/L (ref 135–146)
TRIGL SERPL-MCNC: 161 MG/DL (ref 0–149)

## 2023-03-21 PROCEDURE — 99213 OFFICE O/P EST LOW 20 MIN: CPT | Performed by: PHYSICIAN ASSISTANT

## 2023-03-21 PROCEDURE — 36415 COLL VENOUS BLD VENIPUNCTURE: CPT | Performed by: PHYSICIAN ASSISTANT

## 2023-03-21 PROCEDURE — 80061 LIPID PANEL: CPT | Performed by: PHYSICIAN ASSISTANT

## 2023-03-21 PROCEDURE — 80053 COMPREHEN METABOLIC PANEL: CPT | Performed by: PHYSICIAN ASSISTANT

## 2023-03-21 RX ORDER — ATENOLOL 50 MG/1
50 TABLET ORAL DAILY
Qty: 90 TABLET | Refills: 3 | Status: SHIPPED | OUTPATIENT
Start: 2023-03-21

## 2023-03-21 RX ORDER — POTASSIUM CHLORIDE 1500 MG/1
20 TABLET, EXTENDED RELEASE ORAL DAILY
Qty: 90 TABLET | Refills: 3 | Status: SHIPPED | OUTPATIENT
Start: 2023-03-21

## 2023-03-21 RX ORDER — HYDROCHLOROTHIAZIDE 25 MG/1
25 TABLET ORAL DAILY
Qty: 90 TABLET | Refills: 3 | Status: SHIPPED | OUTPATIENT
Start: 2023-03-21

## 2023-03-21 RX ORDER — ROSUVASTATIN CALCIUM 10 MG/1
10 TABLET, COATED ORAL DAILY
Qty: 90 TABLET | Refills: 3 | Status: SHIPPED | OUTPATIENT
Start: 2023-03-21

## 2023-03-21 RX ORDER — AMLODIPINE BESYLATE 10 MG/1
10 TABLET ORAL DAILY
Qty: 90 TABLET | Refills: 3 | Status: SHIPPED | OUTPATIENT
Start: 2023-03-21

## 2023-03-21 RX ORDER — LOSARTAN POTASSIUM 100 MG/1
100 TABLET ORAL DAILY
Qty: 90 TABLET | Refills: 3 | Status: SHIPPED | OUTPATIENT
Start: 2023-03-21 | End: 2023-11-27

## 2023-03-21 NOTE — PROGRESS NOTES
"CC: Medication Check    History:  HTN:  On quadruple therapy with atenolol, hydrochlorothiazide, losartan, and amlodipine. Also take potassium supplement. Has been taking consistently. Denies side effects. Has been checking BP at home and getting 124-130/70s-85. No chest pain, palpitations, SOB. Up to date on eye exam.     Obesity:  Weight is stable.     Hyperlipidemia:  Stable on rosuvastatin. No side effects.     Swelling in hands:  Had been noticing more swelling in 2nd digits of both hands. She has history of injections in this area for trigger finger.     PMH, MEDICATIONS, ALLERGIES, SOCIAL AND FAMILY HISTORY in [facility] and reviewed by me personally.    ROS negative other than the symptoms noted above in the HPI.     Examination   /78 (BP Location: Right arm, Patient Position: Sitting, Cuff Size: Adult Large)   Pulse 58   Temp 97.4  F (36.3  C) (Temporal)   Ht 1.6 m (5' 3\")   Wt 112 kg (247 lb)   SpO2 99%   BMI 43.75 kg/m       Constitutional: Sitting comfortably, in no acute distress. Vital signs noted  Neck:  no adenopathy, trachea midline and normal to palpation, thyroid normal to palpation  Cardiovascular:  regular rate and rhythm, no murmurs, clicks, or gallops  Respiratory:  normal respiratory rate and rhythm, lungs clear to auscultation  SKIN: No jaundice/pallor/rash.   Psychiatric: mentation appears normal and affect normal/bright      A/P    ICD-10-CM    1. Essential hypertension, benign  I10       2. Generalized edema  R60.1       3. Hyperlipidemia LDL goal <130  E78.5           DISCUSSION:  HTN:  BP nearly meeting goal in clinic, and meeting goal <130/80 at home. Agreed to update fasting labs, and send Kyruust with results. Refilled 1 year.     Obesity:  Continue working with diet and exercise with goal of weight loss.     Hyperlipidemia:  Will update fasting labs and send MyChart. Refilled 1 year.     Swelling in hands:  Recommended compression gloves to try to help with swelling. Can't " limit salt intake too much as she is prone to low sodium.     follow up visit: 1 year    Renee Donnelly PA-C  [facility]

## 2023-03-21 NOTE — NURSING NOTE
Chief Complaint   Patient presents with     Recheck Medication     Fasting med check         Pre-visit Screening:  Immunizations:  up to date  Colonoscopy:  is up to date  Mammogram: is up to date  Asthma Action Test/Plan:  SETH  PHQ9:  NA  GAD7:  NA  Questioned patient about current smoking habits Pt. has never smoked.  Ok to leave detailed message on voice mail for today's visit only Yes, phone # [phone]

## 2023-04-01 ENCOUNTER — HEALTH MAINTENANCE LETTER (OUTPATIENT)
Age: 73
End: 2023-04-01

## 2023-04-17 ENCOUNTER — MYC MEDICAL ADVICE (OUTPATIENT)
Dept: FAMILY MEDICINE | Facility: CLINIC | Age: 73
End: 2023-04-17

## 2023-04-17 DIAGNOSIS — E66.01 MORBID OBESITY WITH BMI OF 40.0-44.9, ADULT (H): ICD-10-CM

## 2023-04-17 DIAGNOSIS — E66.01 MORBID OBESITY DUE TO EXCESS CALORIES (H): Primary | ICD-10-CM

## 2023-04-18 NOTE — TELEPHONE ENCOUNTER
Renee Donnelly PA-C please review patients MY CHART message below regarding Nutrition Referral.     Please complete the pending Nutrition Referral is you are referring    Please advise     Thank you

## 2023-04-27 ENCOUNTER — TELEPHONE (OUTPATIENT)
Dept: FAMILY MEDICINE | Facility: CLINIC | Age: 73
End: 2023-04-27

## 2023-04-27 NOTE — TELEPHONE ENCOUNTER
Nutrition Education Scheduling Outreach #1:    Call to patient to schedule. Left message with phone number to call to schedule.    Plan for 2nd outreach attempt within 2 business days.    Magan Moss OnCall  Diabetes and Nutrition Scheduling

## 2023-05-30 NOTE — TELEPHONE ENCOUNTER
Received a refill request from [facility] for Folbee tablets. Patient Reports taking this in the past. Are these OTC?  Please advise, thanks.      Pending Prescriptions:                       Disp   Refills    Folic Acid-Vit B6-Vit B12 (FOLBEE) 2.5-25*

## 2023-05-30 NOTE — TELEPHONE ENCOUNTER
This is a prescription. I would typically do labs for folic acid, vitamin B12 before recommend pt to start supplement. This would be new script so needs appt.

## 2023-05-31 NOTE — TELEPHONE ENCOUNTER
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 She will make an appt.

## 2023-06-02 RX ORDER — CYANOCOBALAMIN/FOLIC AC/VIT B6 1-2.5-25MG
TABLET ORAL
COMMUNITY
Start: 2023-06-02

## 2023-06-02 NOTE — TELEPHONE ENCOUNTER
Refused Prescriptions:                       Disp   Refills    Folic Acid-Vit B6-Vit B12 (FOLBEE) 2.5-25-*                Refused By: ROSARIO BRYAN  Reason for Refusal: Patient needs appointment

## 2023-06-22 ENCOUNTER — TRANSFERRED RECORDS (OUTPATIENT)
Dept: HEALTH INFORMATION MANAGEMENT | Facility: CLINIC | Age: 73
End: 2023-06-22
Payer: MEDICARE

## 2023-09-03 ENCOUNTER — MYC REFILL (OUTPATIENT)
Dept: FAMILY MEDICINE | Facility: CLINIC | Age: 73
End: 2023-09-03

## 2023-09-03 DIAGNOSIS — G47.00 INSOMNIA, UNSPECIFIED TYPE: ICD-10-CM

## 2023-09-03 DIAGNOSIS — E56.9 VITAMIN DEFICIENCY: Primary | ICD-10-CM

## 2023-09-04 NOTE — TELEPHONE ENCOUNTER
Patient is requesting a refill of  Pending Prescriptions:                       Disp   Refills    Folic Acid-Vit B6-Vit B12 (FOLBEE) 2.5-25*                  Patient was seen for a medication check visit on 3/21/23 and was told to return in 1 year-she was getting this over the counter but would like to get it through insurance now. Routing to [facility] for approval or denial of request.

## 2023-09-05 RX ORDER — CYANOCOBALAMIN/FOLIC AC/VIT B6 1-2.5-25MG
1 TABLET ORAL DAILY
Qty: 90 TABLET | Refills: 3 | Status: SHIPPED | OUTPATIENT
Start: 2023-09-05

## 2023-09-07 RX ORDER — HYDROXYZINE PAMOATE 25 MG/1
25 CAPSULE ORAL
Qty: 30 CAPSULE | Refills: 1 | Status: SHIPPED | OUTPATIENT
Start: 2023-09-07 | End: 2023-11-27

## 2023-09-07 NOTE — TELEPHONE ENCOUNTER
Also received a refill request for hydroxyzine. Last filled 11/3/22 for qty 30 with 1 refill.  Please advise, thanks.      Pending Prescriptions:                       Disp   Refills    hydrOXYzine (VISTARIL) 25 MG capsule      30 cap*1            Sig: Take 1 capsule (25 mg) by mouth nightly as needed           for other (sleep)    Signed Prescriptions:                        Disp   Refills    Folic Acid-Vit B6-Vit B12 (FOLBEE) 2.5-25-*90 tab*3        Sig: Take 1 tablet by mouth daily  Authorizing Provider: LAURIE CORNELL

## 2023-11-17 ENCOUNTER — OFFICE VISIT (OUTPATIENT)
Dept: FAMILY MEDICINE | Facility: CLINIC | Age: 73
End: 2023-11-17

## 2023-11-17 VITALS
HEART RATE: 67 BPM | OXYGEN SATURATION: 98 % | HEIGHT: 63 IN | WEIGHT: 245 LBS | BODY MASS INDEX: 43.41 KG/M2 | DIASTOLIC BLOOD PRESSURE: 64 MMHG | SYSTOLIC BLOOD PRESSURE: 130 MMHG | TEMPERATURE: 98.4 F

## 2023-11-17 DIAGNOSIS — H81.13 BENIGN PAROXYSMAL POSITIONAL VERTIGO, BILATERAL: Primary | ICD-10-CM

## 2023-11-17 PROCEDURE — 36415 COLL VENOUS BLD VENIPUNCTURE: CPT | Performed by: PHYSICIAN ASSISTANT

## 2023-11-17 PROCEDURE — 99213 OFFICE O/P EST LOW 20 MIN: CPT | Performed by: PHYSICIAN ASSISTANT

## 2023-11-17 PROCEDURE — 80048 BASIC METABOLIC PNL TOTAL CA: CPT | Performed by: PHYSICIAN ASSISTANT

## 2023-11-17 RX ORDER — ONDANSETRON 4 MG/1
4 TABLET, ORALLY DISINTEGRATING ORAL EVERY 8 HOURS PRN
Qty: 12 TABLET | Refills: 0 | Status: SHIPPED | OUTPATIENT
Start: 2023-11-17

## 2023-11-17 NOTE — PROGRESS NOTES
"CC: Vertigo    History:  Vertigo:  Yesterday pt was in the shower and started to get vertigo. Shower tiles started spinning, patient became nauseous. Was able to sit down in shower chair. Lasted for 5 minutes, before it improved to the point of being about to get to her bed. Took her BP and was normal, and had some orange juice. Hearing felt like it was muffled.     Felt normal by the time she was going to sleep last night and still feels normal     Pt does have history of vertigo but it was 20+ years ago.     Is having some ear itching in right ear. Has been trying hydrocortisone.     Pt has CPAP that she is compliant with.     PMH, MEDICATIONS, ALLERGIES, SOCIAL AND FAMILY HISTORY in EPIC and reviewed by me personally.    ROS negative other than the symptoms noted above in the HPI.     Examination   /64 (BP Location: Right arm, Patient Position: Sitting, Cuff Size: Adult Large)   Pulse 67   Temp 98.4  F (36.9  C) (Temporal)   Ht 1.6 m (5' 3\")   Wt 111.1 kg (245 lb)   SpO2 98%   BMI 43.40 kg/m       Constitutional: Sitting comfortably, in no acute distress. Vital signs noted  Eyes: pupils equal round reactive to light and accomodation, extra ocular movements intact  Ears: external canals and TMs free of abnormalities  Nose: patent, without mucosal abnormalities  Mouth and throat: without erythema or lesions of the mucosa  Neck:  no adenopathy, trachea midline and normal to palpation, thyroid normal to palpation  Cardiovascular:  regular rate and rhythm, no murmurs, clicks, or gallops  Respiratory:  normal respiratory rate and rhythm, lungs clear to auscultation  SKIN: No jaundice/pallor/rash.   Psychiatric: mentation appears normal and affect normal/bright      A/P    ICD-10-CM    1. Benign paroxysmal positional vertigo, bilateral  H81.13 ondansetron (ZOFRAN ODT) 4 MG ODT tab     VENOUS COLLECTION     Basic Metabolic Panel (BFP)          DISCUSSION:  BPPV:  Symptoms strongly suggestive of benign " paroxysmal positional vertigo (BPPV). Reassured by normal neurological exam today, and resolved symptoms. Agreed not to attempt Eliceo Hallpike exam to avoid triggering symptoms. Explained concept of this to patient, including cause being crystals being dislodged in inner ear and needing these crystals to be returned to proper place for symptoms to resolve. She is familiar with this from having it 20 years ago.     Informed patient that the body can gradually return these crystals to their place on it's own over the course of several days to sometimes weeks. However, I did recommend they try taking meclizine to help with symptoms, and explained basic Epley maneuver to try at home to help return crystals to proper place. Did caution pt of side effects with meclizine, including drowsiness.     Also prescribed small quantity of Zofran to use only if needed if symptoms return.    Encouraged pt to contact me if symptoms return. At that point, would likely coordinate referral to a dizzy and balance physical therapist. Pt should contact me or seek emergency evaluation if symptoms significantly worsen.       follow up visit: As needed    Renee Donnelly PA-C  [facility]

## 2023-11-17 NOTE — NURSING NOTE
Chief Complaint   Patient presents with    Vertigo     Pt experienced a severe episode of vertigo yesterday morning that lasted for 5 minutes or less.           Pre-visit Screening:  Immunizations:  up to date  Colonoscopy:  is up to date  Mammogram: is up to date  Asthma Action Test/Plan:  NA  PHQ9:  NA  GAD7:  NA  Questioned patient about current smoking habits Pt. has never smoked.  Ok to leave detailed message on voice mail for today's visit only Yes, phone # [phone]

## 2023-11-20 LAB
BUN SERPL-MCNC: 18 MG/DL (ref 7–25)
BUN/CREATININE RATIO: 20.7 (ref 6–32)
CALCIUM SERPL-MCNC: 9.7 MG/DL (ref 8.6–10.3)
CHLORIDE SERPLBLD-SCNC: 97.3 MMOL/L (ref 98–110)
CO2 SERPL-SCNC: 29.4 MMOL/L (ref 20–32)
CREAT SERPL-MCNC: 0.87 MG/DL (ref 0.6–1.3)
GLUCOSE SERPL-MCNC: 110 MG/DL (ref 60–99)
POTASSIUM SERPL-SCNC: 3.61 MMOL/L (ref 3.5–5.3)
SODIUM SERPL-SCNC: 135.8 MMOL/L (ref 135–146)

## 2023-11-22 ENCOUNTER — DOCUMENTATION ONLY (OUTPATIENT)
Dept: OTHER | Facility: CLINIC | Age: 73
End: 2023-11-22
Payer: MEDICARE

## 2023-11-23 DIAGNOSIS — G47.00 INSOMNIA, UNSPECIFIED TYPE: ICD-10-CM

## 2023-11-23 DIAGNOSIS — I10 ESSENTIAL HYPERTENSION, BENIGN: ICD-10-CM

## 2023-11-26 ENCOUNTER — MYC REFILL (OUTPATIENT)
Dept: FAMILY MEDICINE | Facility: CLINIC | Age: 73
End: 2023-11-26

## 2023-11-26 DIAGNOSIS — G47.00 INSOMNIA, UNSPECIFIED TYPE: ICD-10-CM

## 2023-11-26 DIAGNOSIS — I10 ESSENTIAL HYPERTENSION, BENIGN: ICD-10-CM

## 2023-11-26 RX ORDER — LOSARTAN POTASSIUM 100 MG/1
100 TABLET ORAL DAILY
Qty: 90 TABLET | Refills: 3 | Status: CANCELLED | OUTPATIENT
Start: 2023-11-26

## 2023-11-26 RX ORDER — HYDROXYZINE PAMOATE 25 MG/1
25 CAPSULE ORAL
Qty: 30 CAPSULE | Refills: 1 | Status: CANCELLED | OUTPATIENT
Start: 2023-11-26

## 2023-11-27 RX ORDER — LOSARTAN POTASSIUM 100 MG/1
100 TABLET ORAL
Qty: 90 TABLET | Refills: 1 | Status: SHIPPED | OUTPATIENT
Start: 2023-11-27

## 2023-11-27 RX ORDER — HYDROXYZINE PAMOATE 25 MG/1
CAPSULE ORAL
Qty: 30 CAPSULE | Refills: 1 | Status: SHIPPED | OUTPATIENT
Start: 2023-11-27 | End: 2024-01-23

## 2023-11-27 NOTE — TELEPHONE ENCOUNTER
Received a refill request for losartan and hydroxyzine.  Pt should have refills of losartan until March 2024.  Hydroxyzine is used for sleep in which qty 30 with 1 refill sent on 9/21/23.    Routing to Dr. Ureña on behalf of Renee to approve/deny hydroxyzine.      Pending Prescriptions:                       Disp   Refills    losartan (COZAAR) 100 MG tablet [Pharmacy*90 tab*1            Sig: TAKE 1 TABLET(100 MG) BY MOUTH DAILY    hydrOXYzine (VISTARIL) 25 MG capsule [Pha*30 cap*1            Sig: TAKE 1 CAPSULE(25 MG) BY MOUTH EVERY NIGHT AS           NEEDED FOR SLEEP

## 2023-11-27 NOTE — TELEPHONE ENCOUNTER
Patient requested a refill of  Pending Prescriptions:                       Disp   Refills    losartan (COZAAR) 100 MG tablet           90 tab*3            Sig: Take 1 tablet (100 mg) by mouth daily    hydrOXYzine (VISTARIL) 25 MG capsule      30 cap*1            Sig: Take 1 capsule (25 mg) by mouth nightly as needed           for other (sleep)    Refills already on file-pt informed via [facility].

## 2024-01-08 ENCOUNTER — OFFICE VISIT (OUTPATIENT)
Dept: FAMILY MEDICINE | Facility: CLINIC | Age: 74
End: 2024-01-08

## 2024-01-08 DIAGNOSIS — U07.1 INFECTION DUE TO 2019 NOVEL CORONAVIRUS: Primary | ICD-10-CM

## 2024-01-08 PROBLEM — R60.1 GENERALIZED EDEMA: Status: RESOLVED | Noted: 2020-04-02 | Resolved: 2024-01-08

## 2024-01-08 PROBLEM — M17.11 ARTHRITIS OF KNEE, RIGHT: Status: RESOLVED | Noted: 2021-01-26 | Resolved: 2024-01-08

## 2024-01-08 PROBLEM — K21.9 GASTROESOPHAGEAL REFLUX DISEASE WITHOUT ESOPHAGITIS: Status: RESOLVED | Noted: 2017-06-13 | Resolved: 2024-01-08

## 2024-01-08 PROCEDURE — 99213 OFFICE O/P EST LOW 20 MIN: CPT | Mod: 95

## 2024-01-08 NOTE — NURSING NOTE
Chief Complaint   Patient presents with    Covid Concern     Virtual visit: symptoms started Saturday night, positive this morning, URI symptoms, cough, congestion, slight wheezing, body aches

## 2024-01-08 NOTE — PROGRESS NOTES
Assessment & Plan     1. Infection due to 2019 novel coronavirus  - Covid 19-higher risk due to age and past medical history. We had an extensive discussion with Maria Victoria about Paxlovid. She does meet the criteria and I believe would benefit from the medication. We discussed common side effects. She will continue all of her daily medications except she will stop Rosuvastatin while taking Paxlovid and restart it after completing the five day course of the medication. She has normal kidney function, last BMP reviewed on 11/20/23. Encouraged Maria Victoria to rest, push fluids, eat healthy and nutritious foods, alternate Tylenol and Ibuprofen PRN, Mucinex for cough, etc. Return to clinic and ER precautions discussed. She was in understanding and in agreement with the plan above and had no further questions or concerns at this time.  - nirmatrelvir and ritonavir (PAXLOVID) 300 mg/100 mg therapy pack; Take 3 tablets by mouth 2 times daily for 5 days  Dispense: 30 tablet; Refill: 0    Follow up as needed. Reasons to follow-up sooner or seek emergent care reviewed.     SUNG Mon-C  [facility]       Subjective     Maria Victoria Arcos is a 73 year old female who presents to clinic today for the following health issues:    HPI   Chief Complaint   Patient presents with     Covid Concern     Virtual visit: symptoms started Saturday night, positive this morning, URI symptoms, cough, congestion, slight wheezing, body aches      Maria Victoria presents via a telehealth visit to discuss testing positive to COVID-19.      Date of the visit: 01/08/2024  Consent for visit from patient or patient representative: Yes  Category for office visit-real-time audio with video or audio/telephone only: real time audio with video  Patient location for the visit: Car (passenger seat)  Provider location for the visit: Clinic  Start time and end time for telehealth encounter or total time spent on the visit: 16 minutes     Maria Victoria presents  virtually to discuss her diagnosis of COVID-19. She states she tested positive for COVID this morning (01/08/24). She notes her symptoms of headache, chills, congestion, sneezing, coughing, and plugged up ears started on Saturday night (01/06/2024). She notes she initially had a sore throat but now does not. She notes her cough is productive and she was wheezing last night and used her Albuterol inhaler which helped. She denies any shortness of breath, chest pain, abdominal pain, nausea, or vomiting. She does note she had a little bit of diarrhea yesterday. She has been taking Mucinex, Tylenol, Ibuprofen, and some cough syrup this morning. She notes her  is also ill with COVID and his symptoms started before her. She also notes she has never had COVID before in the past but is vaccinated with all of the COVID boosters, she did have this years COVID booster.     Past medical history and daily medications reviewed by me.      Objective    There were no vitals taken for this visit.  There is no height or weight on file to calculate BMI.    Physical Examination:  Gen- alert, NAD, pleasant, mentation, judgement and insight intact. Well groomed.

## 2024-01-22 DIAGNOSIS — G47.00 INSOMNIA, UNSPECIFIED TYPE: ICD-10-CM

## 2024-01-22 NOTE — TELEPHONE ENCOUNTER
Maria Victoria Arcos is requesting a refill of:    Pending Prescriptions:                       Disp   Refills    hydrOXYzine bambi (VISTARIL) 25 MG capsule *30 cap*1            Sig: TAKE 1 CAPSULE(25 MG) BY MOUTH EVERY NIGHT AS           NEEDED FOR SLEEP    Please close encounter if RX was sent. Thanks, Mariah

## 2024-01-23 RX ORDER — HYDROXYZINE PAMOATE 25 MG/1
CAPSULE ORAL
Qty: 30 CAPSULE | Refills: 1 | Status: SHIPPED | OUTPATIENT
Start: 2024-01-23

## 2024-01-24 DIAGNOSIS — I10 ESSENTIAL HYPERTENSION, BENIGN: ICD-10-CM

## 2024-01-24 DIAGNOSIS — E78.5 HYPERLIPIDEMIA LDL GOAL <130: ICD-10-CM

## 2024-01-24 DIAGNOSIS — R60.1 GENERALIZED EDEMA: ICD-10-CM

## 2024-01-25 RX ORDER — POTASSIUM CHLORIDE 1500 MG/1
20 TABLET, EXTENDED RELEASE ORAL DAILY
COMMUNITY
Start: 2024-01-25

## 2024-01-25 RX ORDER — ROSUVASTATIN CALCIUM 10 MG/1
10 TABLET, COATED ORAL DAILY
COMMUNITY
Start: 2024-01-25

## 2024-01-25 NOTE — TELEPHONE ENCOUNTER
Maria Victoria Arcos is requesting a refill of:    Refused Prescriptions:                       Disp   Refills    rosuvastatin (CRESTOR) 10 MG tablet [Pharm*                Sig: TAKE 1 TABLET(10 MG) BY MOUTH DAILY  Refused By: DANELLE PERALTA  Reason for Refusal: Patient has requested refill too soon  Reason for Refusal Comment: Refill sent on 03/21/23 for 90 tab with 3 refills    potassium chloride ER (KLOR-CON M) 20 MEQ *                Sig: TAKE 1 TABLET(20 MEQ) BY MOUTH DAILY  Refused By: DANELLE PERALTA  Reason for Refusal: Patient has requested refill too soon  Reason for Refusal Comment: Refill sent on 03/21/23 for 90 tab with 3 refills

## 2024-03-14 DIAGNOSIS — E78.5 HYPERLIPIDEMIA LDL GOAL <130: ICD-10-CM

## 2024-03-14 RX ORDER — ROSUVASTATIN CALCIUM 10 MG/1
10 TABLET, COATED ORAL DAILY
COMMUNITY
Start: 2024-03-14

## 2024-03-14 NOTE — TELEPHONE ENCOUNTER
Maria Victoria Arcos is requesting a refill of:    Refused Prescriptions:                       Disp   Refills    rosuvastatin (CRESTOR) 10 MG tablet [Pharm*                Sig: TAKE 1 TABLET(10 MG) BY MOUTH DAILY  Refused By: DANELLE PERALTA  Reason for Refusal: Patient needs appointment    Pt due for FASTING OV

## 2024-03-26 ENCOUNTER — TRANSFERRED RECORDS (OUTPATIENT)
Dept: HEALTH INFORMATION MANAGEMENT | Facility: CLINIC | Age: 74
End: 2024-03-26

## 2024-04-24 ENCOUNTER — TRANSFERRED RECORDS (OUTPATIENT)
Dept: FAMILY MEDICINE | Facility: CLINIC | Age: 74
End: 2024-04-24

## 2025-03-22 ENCOUNTER — HEALTH MAINTENANCE LETTER (OUTPATIENT)
Age: 75
End: 2025-03-22

## 2025-05-04 ASSESSMENT — ACTIVITIES OF DAILY LIVING (ADL)
AT_ITS_WORST?: 3
PLEASE_INDICATE_YOR_PRIMARY_REASON_FOR_REFERRAL_TO_THERAPY:: SHOULDER

## 2025-05-05 ENCOUNTER — THERAPY VISIT (OUTPATIENT)
Dept: PHYSICAL THERAPY | Facility: CLINIC | Age: 75
End: 2025-05-05
Payer: MEDICARE

## 2025-05-05 DIAGNOSIS — M25.512 CHRONIC LEFT SHOULDER PAIN: Primary | ICD-10-CM

## 2025-05-05 DIAGNOSIS — Z96.612 STATUS POST SHOULDER REPLACEMENT, LEFT: ICD-10-CM

## 2025-05-05 DIAGNOSIS — G89.29 CHRONIC LEFT SHOULDER PAIN: Primary | ICD-10-CM

## 2025-05-05 PROCEDURE — 97161 PT EVAL LOW COMPLEX 20 MIN: CPT | Mod: GP | Performed by: PHYSICAL THERAPIST

## 2025-05-05 PROCEDURE — 97110 THERAPEUTIC EXERCISES: CPT | Mod: GP | Performed by: PHYSICAL THERAPIST

## 2025-05-05 NOTE — PROGRESS NOTES
PHYSICAL THERAPY EVALUATION  Type of Visit: Evaluation        Fall Risk Screen:  Have you fallen 2 or more times in the past year?: No  Have you fallen and had an injury in the past year?: Yes      Subjective         Presenting condition or subjective complaint: shoulder surgery rehab  Patient had a shoulder a Left Reverse Total Arthroplasty with Biceps Tenodesis performed by Nash Banegas M.D. on 3/24/2025.  She is having increased pain globally since she is on a blood thinner and cannot take her motrin. 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  She is on blood thinner until she is out of the sling.  She is worried about getting rid of the sling.  She is doing to [employer] dan[employer]reping exercise per her surgeons instructions. She loves sewing and she misses it, walking, playing the flute. Patient has an x-ray on Thursday for follow up.  They said she does not need to return. Patient had nerve block for the surgery and she still has numbness in the left forearm and some tingling and burning. She is having difficulty dressing, getting clothes on and off, and managing her pain.     Date of onset: 03/24/25 (DOS)    Relevant medical history: Arthritis; Asthma; Bladder or bowel problems; Cancer; COPD; Concussions   Dates & types of surgery: shoulder replacement 03/24/2025    Prior diagnostic imaging/testing results: X-ray     Prior therapy history for the same diagnosis, illness or injury: No      Prior Level of Function  Transfers: Independent  Ambulation: Independent  ADL: Independent    Living Environment  Social support: With a significant other or spouse   Type of home: [facility]   Stairs to enter the home: Yes 4 Is there a railing: Yes     Ramp: No   Stairs inside the home: Yes   Is there a railing: Yes     Help at home: None  Equipment owned: Straight Cane; Walker; Walker with wheels; Standard wheelchair; Bedrail; Grab bars; Bath bench     Employment: No     Hobbies/Interests: sewing, playing flute    Patient goals for therapy: everything    Pain assessment: Location: shoulder /Rating: 3/10  worst in the last 24 hours: 9/10. Pain is located in the arm pit and underneath.      Objective   Observation: scar appears well healed without signs of infection. Rounded protracted posture.   PROM Left shoulder: flex: 90 with pain in armpit, ER0: 35 limited due to unknown precautions, IR0: 45 limited due to unknown precautions.   Elbow AROM: flex; 75% of full with increased anterior/inferior upper arm pain, ex: 0 degrees with increased pain and pulling through biceps.   Palpation: tightness and increased pain through R UT.  Special tests deferred due to recent surgery.     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  The following significant findings have been identified: Pain, Decreased ROM/flexibility, Decreased joint mobility, Decreased strength, Decreased proprioception, Impaired sensation, Impaired muscle performance, Decreased activity tolerance, and Impaired posture. These impairments interfere with their ability to perform self care tasks, recreational activities, household chores, driving , household mobility, and community mobility as compared to previous level of function.     Clinical Decision Making (Complexity):  Clinical Presentation: Stable/Uncomplicated  Clinical Presentation Rationale: based on medical and personal factors listed in PT evaluation  Clinical Decision Making (Complexity): Low complexity    PLAN OF CARE  Treatment Interventions:  Modalities: Cryotherapy, Cupping, Dry Needling, E-stim, Hot Pack, Tape  Interventions: Manual Therapy, Neuromuscular  Re-education, Therapeutic Activity, Therapeutic Exercise, Self-Care/Home Management    Long Term Goals     PT Goal 1  Goal Identifier: Reaching  Goal Description: Patient will reach 80% of full range with good form in order  Rationale: to maximize safety and independence with performance of ADLs and functional tasks;to maximize safety and independence within the home;to maximize safety and independence within the community;to maximize safety and independence with transportation;to maximize safety and independence with self cares  Target Date: 06/30/25      Frequency of Treatment: 2 times per week  Duration of Treatment: 8 weeks      Education Assessment:   Learner/Method: Patient;Listening;Demonstration;Reading;Pictures/Video;No Barriers to Learning  Education Comments: Educated on findings of exam and likely frequency/duration of PT POC.    Risks and benefits of evaluation/treatment have been explained.   Patient/Family/caregiver agrees with Plan of Care.     Evaluation Time:     PT Eval, Low Complexity Minutes (46094): 25    Signing Clinician: Mayela Mcguire, PT        [facility]                                                                                   OUTPATIENT PHYSICAL THERAPY      PLAN OF TREATMENT FOR OUTPATIENT REHABILITATION   Patient's Last Name, First Name, Maria Victoria Smith YOB: 1950   Provider's Name   [facility]   Medical Record No.  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             Physician Signature               Date    X_____________________________________________________                  Referring Provider:  Celeste Vo PA-C    Initial Assessment  See Epic Evaluation- Start of Care Date: 05/05/25

## 2025-05-07 PROBLEM — M25.512 CHRONIC LEFT SHOULDER PAIN: Status: ACTIVE | Noted: 2025-05-07

## 2025-05-07 PROBLEM — G89.29 CHRONIC LEFT SHOULDER PAIN: Status: ACTIVE | Noted: 2025-05-07

## 2025-05-07 PROBLEM — Z96.612 STATUS POST SHOULDER REPLACEMENT, LEFT: Status: ACTIVE | Noted: 2025-05-07

## 2025-05-12 ENCOUNTER — THERAPY VISIT (OUTPATIENT)
Dept: PHYSICAL THERAPY | Facility: CLINIC | Age: 75
End: 2025-05-12
Payer: MEDICARE

## 2025-05-12 DIAGNOSIS — M25.512 CHRONIC LEFT SHOULDER PAIN: Primary | ICD-10-CM

## 2025-05-12 DIAGNOSIS — Z96.612 STATUS POST SHOULDER REPLACEMENT, LEFT: ICD-10-CM

## 2025-05-12 DIAGNOSIS — G89.29 CHRONIC LEFT SHOULDER PAIN: Primary | ICD-10-CM

## 2025-05-12 PROCEDURE — 97110 THERAPEUTIC EXERCISES: CPT | Mod: GP | Performed by: PHYSICAL THERAPIST

## 2025-05-15 ENCOUNTER — TRANSCRIBE ORDERS (OUTPATIENT)
Dept: OTHER | Age: 75
End: 2025-05-15

## 2025-05-15 DIAGNOSIS — M25.512 LEFT SHOULDER PAIN: ICD-10-CM

## 2025-05-15 DIAGNOSIS — Z96.612 S/P SHOULDER REPLACEMENT, LEFT: Primary | ICD-10-CM

## 2025-05-15 DIAGNOSIS — G89.29 OTHER CHRONIC PAIN: ICD-10-CM

## 2025-05-19 ENCOUNTER — TRANSCRIBE ORDERS (OUTPATIENT)
Dept: OTHER | Age: 75
End: 2025-05-19

## 2025-05-19 DIAGNOSIS — Z96.612 STATUS POST REVERSE TOTAL ARTHROPLASTY OF LEFT SHOULDER: Primary | ICD-10-CM

## 2025-05-21 ENCOUNTER — THERAPY VISIT (OUTPATIENT)
Dept: PHYSICAL THERAPY | Facility: CLINIC | Age: 75
End: 2025-05-21
Payer: MEDICARE

## 2025-05-21 DIAGNOSIS — G89.29 CHRONIC LEFT SHOULDER PAIN: Primary | ICD-10-CM

## 2025-05-21 DIAGNOSIS — Z96.612 STATUS POST SHOULDER REPLACEMENT, LEFT: ICD-10-CM

## 2025-05-21 DIAGNOSIS — M25.512 CHRONIC LEFT SHOULDER PAIN: Primary | ICD-10-CM

## 2025-05-21 PROCEDURE — 97110 THERAPEUTIC EXERCISES: CPT | Mod: GP | Performed by: PHYSICAL THERAPIST

## 2025-05-27 ENCOUNTER — THERAPY VISIT (OUTPATIENT)
Dept: PHYSICAL THERAPY | Facility: CLINIC | Age: 75
End: 2025-05-27
Payer: MEDICARE

## 2025-05-27 DIAGNOSIS — Z96.612 STATUS POST SHOULDER REPLACEMENT, LEFT: ICD-10-CM

## 2025-05-27 DIAGNOSIS — G89.29 CHRONIC LEFT SHOULDER PAIN: Primary | ICD-10-CM

## 2025-05-27 DIAGNOSIS — M25.512 CHRONIC LEFT SHOULDER PAIN: Primary | ICD-10-CM

## 2025-05-27 PROCEDURE — 97110 THERAPEUTIC EXERCISES: CPT | Mod: GP | Performed by: PHYSICAL THERAPIST

## 2025-06-12 ENCOUNTER — THERAPY VISIT (OUTPATIENT)
Dept: PHYSICAL THERAPY | Facility: CLINIC | Age: 75
End: 2025-06-12
Payer: MEDICARE

## 2025-06-12 DIAGNOSIS — M25.512 CHRONIC LEFT SHOULDER PAIN: Primary | ICD-10-CM

## 2025-06-12 DIAGNOSIS — Z96.612 STATUS POST SHOULDER REPLACEMENT, LEFT: ICD-10-CM

## 2025-06-12 DIAGNOSIS — G89.29 CHRONIC LEFT SHOULDER PAIN: Primary | ICD-10-CM

## 2025-06-19 ENCOUNTER — THERAPY VISIT (OUTPATIENT)
Dept: PHYSICAL THERAPY | Facility: CLINIC | Age: 75
End: 2025-06-19
Payer: MEDICARE

## 2025-06-19 DIAGNOSIS — Z96.612 STATUS POST SHOULDER REPLACEMENT, LEFT: ICD-10-CM

## 2025-06-19 DIAGNOSIS — G89.29 CHRONIC LEFT SHOULDER PAIN: Primary | ICD-10-CM

## 2025-06-19 DIAGNOSIS — M25.512 CHRONIC LEFT SHOULDER PAIN: Primary | ICD-10-CM

## 2025-06-19 NOTE — PROGRESS NOTES
"   06/19/25 0500   Appointment Info   Signing clinician's name / credentials Mayela Mcguire, PT, DPT, CMT, OCS   Total/Authorized Visits 16PT poc (MD: E&T)   Visits Used 10   Medical Diagnosis Arthoplasty Total Shoulder Replacement Status Post Left (Z96.612)   PT Tx Diagnosis Left shoulder Pain, Arthoplasty Total Shoulder Replacement Status Post Left (Z96.612)   Precautions/Limitations Per referral: \"6 weeks s/p total shoulder replacement begin [place] and Wand with gentle osimetric exercise. Also External and Elevation exercises as tolerated. 1-3 visits with home instructions. REstrictions are no lifting, pushing, pulling with a force greater than 5 to 10 lbs with the operative upper extremity, no repetitive  overhead activity for the first six month from surgery. At six months may then increased by 10lbs every six weeks with a lifetime limit of 25 pounds  with the operative upper extremity.\"   Other pertinent information reverse total shoulder L, Tenodesis,   Progress Note/Certification   Start of Care Date 05/05/25   Onset of illness/injury or Date of Surgery 03/24/25  (DOS)   Therapy Frequency 1 time per week for 4 weeks   Predicted Duration tapering to 1 time a month for 2 months   Certification date from 06/19/25   Certification date to 09/16/25   Progress Note Due Date 09/16/25   PT Goal 1   Goal Identifier Reaching   Goal Description Patient will reach 80% of full range with good form in order   Rationale to maximize safety and independence with performance of ADLs and functional tasks;to maximize safety and independence within the home;to maximize safety and independence within the community;to maximize safety and independence with transportation;to maximize safety and independence with self cares   Goal Progress Improving see Objective measures.   Target Date 09/16/25   Subjective Report   Subjective Report SHe continues to get some numbness into the left forearm and by the end of the day it is down into the " arm. 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 Patient saw Dr. Washington and really liked him.   Objective Measures   Objective Measures Objective Measure 1;Objective Measure 2   Objective Measure 1   Objective Measure ROM   Details AROM L shoulder: flex 130, abd 105, ER: 60,  FunIR: Glute med.   Treatment Interventions (PT)   Interventions Therapeutic Procedure/Exercise   Therapeutic Procedure/Exercise   Therapeutic Procedures: strength, endurance, ROM, flexibility minutes (61374) 38   Therapeutic Procedures Ther Proc 9   Ther Proc 1 UBE   Ther Proc 1 - Details 5 min   Ther Proc 2 supine shoulder flexion, 10x AAROM other hand for support   Ther Proc 3 shoulder abduction, sidelying   Ther Proc 3 - Details 1 x 10 reps   Ther Proc 4 shoulder horizontal abduction, sidelying, HELD   Ther Proc 5 Scaption   Ther Proc 5 - Details 1 x 20 reps   Ther Proc 6 Sidelying ER   Ther Proc 6 - Details 2 x 15 reps fatiguing for patient.   Ther Proc 7 shoulder PROM in supine   Ther Proc 7 - Details flex/abd/IR/ER   Ther Proc 8 Supine shoulder punch   Ther Proc 8 - Details 1 x 10 reps   Ther Proc 9 supine serratus punch   Ther Proc 9 - Details 1 x 10 reps   PTRx Ther Proc 3 Pendulum/Codmans   PTRx Ther Proc 3 - Details 1 x 10 reps   PTRx Ther Proc 4 Pulley Shoulder Flexion   PTRx Ther Proc 4 - Details 2 min Flex and abduction   PTRx Ther Proc 5 Isometric Shoulder Extension   PTRx Ther Proc 5 - Details HELD   PTRx Ther Proc 6 Wall Climb   PTRx Ther Proc 6 - Details HELD   Skilled Intervention Verbal cuing and demonstration for proper performance of exercises.   Patient Response/Progress Patient tolerated well.   Education   Learner/Method Patient;Listening;Demonstration;Reading;Pictures/Video;No Barriers to Learning   Plan   Home program see PTrx   Plan for next session Follow MD protocol listed in precautions section. tolerating upright progress  nicely.  Plan to continue to progress gentle painfree strengthening.   Total Session Time   Timed Code Treatment Minutes 38   Total Treatment Time (sum of timed and untimed services) 38         [facility]                                                                                   OUTPATIENT PHYSICAL THERAPY    PLAN OF TREATMENT FOR OUTPATIENT REHABILITATION   Patient's Last Name, First Name, Maria Victoria Smith YOB: 1950   Provider's Name   [facility]   Medical Record No.  [id number]     Onset Date: 03/24/25 (DOS)  Start of Care Date: 05/05/25     Medical Diagnosis:  Arthoplasty Total Shoulder Replacement Status Post Left (Z96.612)      PT Treatment Diagnosis:  Left shoulder Pain, Arthoplasty Total Shoulder Replacement Status Post Left (Z96.612) Plan of Treatment  Frequency/Duration: 1 time per week for 4 weeks/ tapering to 1 time a month for 2 months    Certification date from 06/19/25 to 09/16/25         See note for plan of treatment details and functional goals     Mayela Mcguire, PT                         I CERTIFY THE NEED FOR THESE SERVICES FURNISHED UNDER        THIS PLAN OF TREATMENT AND WHILE UNDER MY CARE .             Physician Signature               Date    X_____________________________________________________                  Referring Provider:  Danial Garcia    Initial Assessment  See Epic Evaluation- Start of Care Date: 05/05/25            PLAN  Continue therapy per current plan of care.    Beginning/End Dates of Progress Note Reporting Period:   5/5/2025 to 06/19/2025    Referring Provider:  Celeste Vo

## (undated) DEVICE — SOL NACL 0.9% INJ 250ML BAG 2B1322Q

## (undated) DEVICE — SOL NACL 0.9% IRRIG 3000ML BAG 2B7477

## (undated) DEVICE — BLADE SAW SAGITTAL STRK 25X90X1.27MM HD SYS 6 6125-127-090

## (undated) DEVICE — SU STRATAFIX PDS PLUS 1 CT-1 12" SXPP1A443

## (undated) DEVICE — DRAPE STERI U 1015

## (undated) DEVICE — SUTURE VICRYL+ 0 CT-1 18" DYED VIO VCP740D

## (undated) DEVICE — SOL WATER IRRIG 1000ML BOTTLE 2F7114

## (undated) DEVICE — SET HANDPIECE INTERPULSE W/COAXIAL FAN SPRAY TIP 0210118000

## (undated) DEVICE — SUCTION MANIFOLD NEPTUNE 2 SYS 4 PORT 0702-020-000

## (undated) DEVICE — SOL NACL 0.9% IRRIG 1000ML BOTTLE 2F7124

## (undated) DEVICE — PREP CHLORAPREP 26ML TINTED HI-LITE ORANGE 930815

## (undated) DEVICE — SU STRATAFIX PDS PLUS 0 CT-1 18" SXPP1A401

## (undated) DEVICE — BONE CEMENT MIXEVAC III HI VAC KIT  0206-015-000

## (undated) DEVICE — Device

## (undated) DEVICE — BASIN SET MINOR DISP

## (undated) DEVICE — CAST PADDING 6" STERILE 9046S

## (undated) DEVICE — ESU BIPOLAR SEALER AQUAMANTYS 6MM 23-112-1

## (undated) DEVICE — SU STRATAFIX MONOCRYL 3-0 SPIRAL PS-1 45CM SXMP1B102

## (undated) DEVICE — GLOVE PROTEXIS POWDER FREE SMT 7.5  2D72PT75X

## (undated) DEVICE — LINEN FULL SHEET 5511

## (undated) DEVICE — PACK TOTAL KNEE BOXED LATEX FREE PO15TKFCT

## (undated) DEVICE — CATH TRAY FOLEY SURESTEP 16FR DRAIN BAG STATOCK A899916

## (undated) DEVICE — DRSG TELFA 3X8" 1238

## (undated) DEVICE — GLOVE PROTEXIS POWDER FREE 7.0 ORTHOPEDIC 2D73ET70

## (undated) DEVICE — GLOVE PROTEXIS BLUE W/NEU-THERA 7.0  2D73EB70

## (undated) DEVICE — SUTURE VICRYL+ 2-0 CT-2 27" UND VCP269H

## (undated) DEVICE — IMM KNEE 20"

## (undated) DEVICE — GLOVE PROTEXIS BLUE W/NEU-THERA 7.5  2D73EB75

## (undated) DEVICE — TOURNIQUET SGL BLADDER 34"X4" PURPLE 5921-034-135

## (undated) DEVICE — LINEN ORTHO ACL PACK 5447

## (undated) DEVICE — BAG CLEAR TRASH 1.3M 39X33" P4040C

## (undated) DEVICE — DRAPE IOBAN INCISE 23X17" 6650EZ

## (undated) DEVICE — ESU PENCIL W/SMOKE EVAC CVPLP2000

## (undated) DEVICE — GLOVE PROTEXIS POWDER FREE 7.5 ORTHOPEDIC 2D73ET75

## (undated) DEVICE — ESU GROUND PAD ADULT W/CORD E7507

## (undated) RX ORDER — LIDOCAINE HYDROCHLORIDE 10 MG/ML
INJECTION, SOLUTION EPIDURAL; INFILTRATION; INTRACAUDAL; PERINEURAL
Status: DISPENSED
Start: 2021-01-26

## (undated) RX ORDER — PROPOFOL 10 MG/ML
INJECTION, EMULSION INTRAVENOUS
Status: DISPENSED
Start: 2021-01-26

## (undated) RX ORDER — ONDANSETRON 2 MG/ML
INJECTION INTRAMUSCULAR; INTRAVENOUS
Status: DISPENSED
Start: 2021-01-26

## (undated) RX ORDER — CEFAZOLIN SODIUM 2 G/100ML
INJECTION, SOLUTION INTRAVENOUS
Status: DISPENSED
Start: 2021-01-26

## (undated) RX ORDER — TRANEXAMIC ACID 650 MG/1
TABLET ORAL
Status: DISPENSED
Start: 2021-01-26

## (undated) RX ORDER — CELECOXIB 200 MG/1
CAPSULE ORAL
Status: DISPENSED
Start: 2021-01-26

## (undated) RX ORDER — FENTANYL CITRATE 50 UG/ML
INJECTION, SOLUTION INTRAMUSCULAR; INTRAVENOUS
Status: DISPENSED
Start: 2021-01-26

## (undated) RX ORDER — LIDOCAINE HYDROCHLORIDE 20 MG/ML
INJECTION, SOLUTION EPIDURAL; INFILTRATION; INTRACAUDAL; PERINEURAL
Status: DISPENSED
Start: 2021-01-26

## (undated) RX ORDER — ACETAMINOPHEN 325 MG/1
TABLET ORAL
Status: DISPENSED
Start: 2021-01-26

## (undated) RX ORDER — DEXAMETHASONE SODIUM PHOSPHATE 4 MG/ML
INJECTION, SOLUTION INTRA-ARTICULAR; INTRALESIONAL; INTRAMUSCULAR; INTRAVENOUS; SOFT TISSUE
Status: DISPENSED
Start: 2021-01-26

## (undated) RX ORDER — VANCOMYCIN HYDROCHLORIDE 1 G/20ML
INJECTION, POWDER, LYOPHILIZED, FOR SOLUTION INTRAVENOUS
Status: DISPENSED
Start: 2021-01-26

## (undated) RX ORDER — GLYCOPYRROLATE 0.2 MG/ML
INJECTION INTRAMUSCULAR; INTRAVENOUS
Status: DISPENSED
Start: 2021-01-26